# Patient Record
Sex: MALE | URBAN - METROPOLITAN AREA
[De-identification: names, ages, dates, MRNs, and addresses within clinical notes are randomized per-mention and may not be internally consistent; named-entity substitution may affect disease eponyms.]

---

## 2024-09-13 ENCOUNTER — APPOINTMENT (OUTPATIENT)
Dept: CARDIOLOGY | Facility: HOSPITAL | Age: 64
End: 2024-09-13
Payer: COMMERCIAL

## 2024-09-13 ENCOUNTER — APPOINTMENT (OUTPATIENT)
Dept: RADIOLOGY | Facility: HOSPITAL | Age: 64
End: 2024-09-13
Payer: COMMERCIAL

## 2024-09-13 ENCOUNTER — HOSPITAL ENCOUNTER (INPATIENT)
Facility: HOSPITAL | Age: 64
End: 2024-09-13
Attending: INTERNAL MEDICINE | Admitting: INTERNAL MEDICINE
Payer: COMMERCIAL

## 2024-09-13 DIAGNOSIS — K70.40 ALCOHOLIC LIVER FAILURE (MULTI): Primary | ICD-10-CM

## 2024-09-13 DIAGNOSIS — K76.82 HEPATIC ENCEPHALOPATHY: ICD-10-CM

## 2024-09-13 DIAGNOSIS — I50.32 CHRONIC HEART FAILURE WITH PRESERVED EJECTION FRACTION: ICD-10-CM

## 2024-09-13 DIAGNOSIS — K70.9: ICD-10-CM

## 2024-09-13 DIAGNOSIS — S91.309A MULTIPLE OPEN WOUNDS OF FOOT: ICD-10-CM

## 2024-09-13 DIAGNOSIS — R00.0 TACHYCARDIA: ICD-10-CM

## 2024-09-13 DIAGNOSIS — Z13.6 ENCOUNTER FOR SCREENING FOR CARDIOVASCULAR DISORDERS: ICD-10-CM

## 2024-09-13 DIAGNOSIS — E43 SEVERE PROTEIN-CALORIE MALNUTRITION (MULTI): ICD-10-CM

## 2024-09-13 LAB
A1AT SERPL NEPH-MCNC: 167 MG/DL (ref 84–218)
ABO GROUP (TYPE) IN BLOOD: NORMAL
ABO GROUP (TYPE) IN BLOOD: NORMAL
ACANTHOCYTES BLD QL SMEAR: ABNORMAL
AFP SERPL-MCNC: 4 NG/ML (ref 0–9)
ALBUMIN SERPL BCP-MCNC: 1.7 G/DL (ref 3.4–5)
ALP SERPL-CCNC: 804 U/L (ref 33–136)
ALT SERPL W P-5'-P-CCNC: 278 U/L (ref 10–52)
AMPHETAMINES UR QL SCN: ABNORMAL
ANION GAP BLDV CALCULATED.4IONS-SCNC: 13 MMOL/L (ref 10–25)
ANION GAP SERPL CALC-SCNC: 17 MMOL/L (ref 10–20)
ANTIBODY SCREEN: NORMAL
APAP SERPL-MCNC: <10 UG/ML
APTT PPP: 125 SECONDS (ref 27–38)
AST SERPL W P-5'-P-CCNC: 945 U/L (ref 9–39)
BARBITURATES UR QL SCN: ABNORMAL
BASE EXCESS BLDV CALC-SCNC: -0.6 MMOL/L (ref -2–3)
BASOPHILS # BLD AUTO: 0.01 X10*3/UL (ref 0–0.1)
BASOPHILS # BLD MANUAL: 0 X10*3/UL (ref 0–0.1)
BASOPHILS NFR BLD AUTO: 0.1 %
BASOPHILS NFR BLD MANUAL: 0 %
BENZODIAZ UR QL SCN: ABNORMAL
BILIRUB DIRECT SERPL-MCNC: 1.3 MG/DL (ref 0–0.3)
BILIRUB SERPL-MCNC: 2.7 MG/DL (ref 0–1.2)
BODY TEMPERATURE: 37 DEGREES CELSIUS
BUN SERPL-MCNC: 8 MG/DL (ref 6–23)
BURR CELLS BLD QL SMEAR: ABNORMAL
BZE UR QL SCN: ABNORMAL
CA-I BLDV-SCNC: 1.09 MMOL/L (ref 1.1–1.33)
CALCIUM SERPL-MCNC: 7.6 MG/DL (ref 8.6–10.6)
CANNABINOIDS UR QL SCN: ABNORMAL
CERULOPLASMIN SERPL-MCNC: 31 MG/DL (ref 20–60)
CFT FORM KAOLIN IND BLD RES TEG: ABNORMAL MIN
CHLORIDE BLDV-SCNC: 109 MMOL/L (ref 98–107)
CHLORIDE SERPL-SCNC: 106 MMOL/L (ref 98–107)
CK SERPL-CCNC: 261 U/L (ref 0–325)
CLOT ANGLE.KAOLIN INDUCED BLD RES TEG: <39 DEG (ref 63–78)
CLOT INIT KAO IND P HEP NEUT BLD RES TEG: 11.9 MIN (ref 4.3–8.3)
CLOT INIT KAO IND P HEP NEUT BLD RES TEG: >17 MIN (ref 4.6–9.1)
CO2 SERPL-SCNC: 24 MMOL/L (ref 21–32)
CREAT SERPL-MCNC: 0.85 MG/DL (ref 0.5–1.3)
EBV DNA SPEC NAA+PROBE-LOG#: ABNORMAL {LOG_COPIES}/ML
EGFRCR SERPLBLD CKD-EPI 2021: >90 ML/MIN/1.73M*2
EJECTION FRACTION APICAL 4 CHAMBER: 82.8
EJECTION FRACTION: 78 %
EOSINOPHIL # BLD AUTO: 0.01 X10*3/UL (ref 0–0.7)
EOSINOPHIL # BLD MANUAL: 0 X10*3/UL (ref 0–0.7)
EOSINOPHIL NFR BLD AUTO: 0.1 %
EOSINOPHIL NFR BLD MANUAL: 0 %
ERYTHROCYTE [DISTWIDTH] IN BLOOD BY AUTOMATED COUNT: 14.9 % (ref 11.5–14.5)
ERYTHROCYTE [DISTWIDTH] IN BLOOD BY AUTOMATED COUNT: 15.9 % (ref 11.5–14.5)
FENTANYL+NORFENTANYL UR QL SCN: ABNORMAL
FERRITIN SERPL-MCNC: >9000 NG/ML (ref 20–300)
FIBRINOGEN BLD CALC-MCNC: 423 MG/DL (ref 278–581)
FIBRINOGEN PPP-MCNC: 203 MG/DL (ref 200–400)
GLUCOSE BLD MANUAL STRIP-MCNC: 100 MG/DL (ref 74–99)
GLUCOSE BLD MANUAL STRIP-MCNC: 111 MG/DL (ref 74–99)
GLUCOSE BLD MANUAL STRIP-MCNC: 118 MG/DL (ref 74–99)
GLUCOSE BLD MANUAL STRIP-MCNC: 97 MG/DL (ref 74–99)
GLUCOSE BLDV-MCNC: 99 MG/DL (ref 74–99)
GLUCOSE SERPL-MCNC: 99 MG/DL (ref 74–99)
HAPTOGLOB SERPL NEPH-MCNC: <30 MG/DL (ref 30–200)
HAV IGM SER QL: NONREACTIVE
HBV CORE IGM SER QL: NONREACTIVE
HBV SURFACE AG SERPL QL IA: NONREACTIVE
HCO3 BLDV-SCNC: 24.2 MMOL/L (ref 22–26)
HCT VFR BLD AUTO: 26.4 % (ref 41–52)
HCT VFR BLD AUTO: 28.2 % (ref 41–52)
HCT VFR BLD EST: 10 % (ref 41–52)
HCV AB SER QL: NONREACTIVE
HCV RNA SERPL NAA+PROBE-ACNC: NOT DETECTED K[IU]/ML
HCV RNA SERPL NAA+PROBE-LOG IU: NORMAL {LOG_IU}/ML
HGB BLD-MCNC: 9.4 G/DL (ref 13.5–17.5)
HGB BLD-MCNC: 9.5 G/DL (ref 13.5–17.5)
HGB BLDV-MCNC: 3.3 G/DL (ref 13.5–17.5)
HSV1 DNA BLD QL NAA+PROBE: NOT DETECTED
HSV2 DNA BLD QL NAA+PROBE: NOT DETECTED
IGG SERPL-MCNC: 1600 MG/DL (ref 700–1600)
IMM GRANULOCYTES # BLD AUTO: 0.05 X10*3/UL (ref 0–0.7)
IMM GRANULOCYTES # BLD AUTO: 0.06 X10*3/UL (ref 0–0.7)
IMM GRANULOCYTES NFR BLD AUTO: 0.5 % (ref 0–0.9)
IMM GRANULOCYTES NFR BLD AUTO: 0.7 % (ref 0–0.9)
INHALED O2 CONCENTRATION: 21 %
INR PPP: 2.8 (ref 0.9–1.1)
IRON SATN MFR SERPL: ABNORMAL %
IRON SERPL-MCNC: 85 UG/DL (ref 35–150)
LABORATORY COMMENT REPORT: ABNORMAL
LABORATORY COMMENT REPORT: NORMAL
LACTATE BLDV-SCNC: 4.8 MMOL/L (ref 0.4–2)
LDH SERPL L TO P-CCNC: 846 U/L (ref 84–246)
LEFT VENTRICLE INTERNAL DIMENSION DIASTOLE: 3.3 CM (ref 3.5–6)
LIPASE SERPL-CCNC: 13 U/L (ref 9–82)
LYMPHOCYTES # BLD AUTO: 1.08 X10*3/UL (ref 1.2–4.8)
LYMPHOCYTES # BLD MANUAL: 0.63 X10*3/UL (ref 1.2–4.8)
LYMPHOCYTES NFR BLD AUTO: 10.7 %
LYMPHOCYTES NFR BLD MANUAL: 7.8 %
MA KAOLIN BLD RES TEG: <40 MM (ref 52–69)
MA KAOLIN+TF BLD RES TEG: 65 MM (ref 52–70)
MA TF IND+IIB-IIIA INH BLD RES TEG: 23 MM (ref 15–32)
MCH RBC QN AUTO: 28.3 PG (ref 26–34)
MCH RBC QN AUTO: 28.5 PG (ref 26–34)
MCHC RBC AUTO-ENTMCNC: 33.3 G/DL (ref 32–36)
MCHC RBC AUTO-ENTMCNC: 36 G/DL (ref 32–36)
MCV RBC AUTO: 79 FL (ref 80–100)
MCV RBC AUTO: 86 FL (ref 80–100)
METHADONE UR QL SCN: ABNORMAL
MONOCYTES # BLD AUTO: 0.47 X10*3/UL (ref 0.1–1)
MONOCYTES # BLD MANUAL: 0.15 X10*3/UL (ref 0.1–1)
MONOCYTES NFR BLD AUTO: 4.6 %
MONOCYTES NFR BLD MANUAL: 1.8 %
NEUTROPHILS # BLD AUTO: 8.49 X10*3/UL (ref 1.2–7.7)
NEUTROPHILS NFR BLD AUTO: 84 %
NEUTS SEG # BLD MANUAL: 7.32 X10*3/UL (ref 1.2–7)
NEUTS SEG NFR BLD MANUAL: 90.4 %
NRBC BLD-RTO: 0.2 /100 WBCS (ref 0–0)
NRBC BLD-RTO: 0.7 /100 WBCS (ref 0–0)
OPIATES UR QL SCN: ABNORMAL
OXYCODONE+OXYMORPHONE UR QL SCN: ABNORMAL
OXYHGB MFR BLDV: 60.2 % (ref 45–75)
PATH REVIEW-CBC DIFFERENTIAL: NORMAL
PCO2 BLDV: 39 MM HG (ref 41–51)
PCP UR QL SCN: ABNORMAL
PH BLDV: 7.4 PH (ref 7.33–7.43)
PHOSPHATE SERPL-MCNC: 2.5 MG/DL (ref 2.5–4.9)
PLATELET # BLD AUTO: 112 X10*3/UL (ref 150–450)
PLATELET # BLD AUTO: 112 X10*3/UL (ref 150–450)
PO2 BLDV: 42 MM HG (ref 35–45)
POTASSIUM BLDV-SCNC: 4.1 MMOL/L (ref 3.5–5.3)
POTASSIUM SERPL-SCNC: 4 MMOL/L (ref 3.5–5.3)
PROT SERPL-MCNC: 4.9 G/DL (ref 6.4–8.2)
PROTHROMBIN TIME: 32.4 SECONDS (ref 9.8–12.8)
RBC # BLD AUTO: 3.3 X10*6/UL (ref 4.5–5.9)
RBC # BLD AUTO: 3.36 X10*6/UL (ref 4.5–5.9)
RBC MORPH BLD: ABNORMAL
RH FACTOR (ANTIGEN D): NORMAL
RH FACTOR (ANTIGEN D): NORMAL
RIGHT VENTRICLE FREE WALL PEAK S': 19.8 CM/S
RIGHT VENTRICLE PEAK SYSTOLIC PRESSURE: 34.1 MMHG
SAO2 % BLDV: 61 % (ref 45–75)
SODIUM BLDV-SCNC: 142 MMOL/L (ref 136–145)
SODIUM SERPL-SCNC: 143 MMOL/L (ref 136–145)
TARGETS BLD QL SMEAR: ABNORMAL
TIBC SERPL-MCNC: ABNORMAL UG/DL
TOTAL CELLS COUNTED BLD: 115
TRIGL SERPL-MCNC: 106 MG/DL (ref 0–149)
TSH SERPL-ACNC: 0.87 MIU/L (ref 0.44–3.98)
UIBC SERPL-MCNC: <55 UG/DL (ref 110–370)
WBC # BLD AUTO: 10.1 X10*3/UL (ref 4.4–11.3)
WBC # BLD AUTO: 8.1 X10*3/UL (ref 4.4–11.3)

## 2024-09-13 PROCEDURE — 82947 ASSAY GLUCOSE BLOOD QUANT: CPT

## 2024-09-13 PROCEDURE — 2500000004 HC RX 250 GENERAL PHARMACY W/ HCPCS (ALT 636 FOR OP/ED)

## 2024-09-13 PROCEDURE — 86381 MITOCHONDRIAL ANTIBODY EACH: CPT

## 2024-09-13 PROCEDURE — 85610 PROTHROMBIN TIME: CPT

## 2024-09-13 PROCEDURE — 71045 X-RAY EXAM CHEST 1 VIEW: CPT

## 2024-09-13 PROCEDURE — 83010 ASSAY OF HAPTOGLOBIN QUANT: CPT

## 2024-09-13 PROCEDURE — 87521 HEPATITIS C PROBE&RVRS TRNSC: CPT

## 2024-09-13 PROCEDURE — 93325 DOPPLER ECHO COLOR FLOW MAPG: CPT

## 2024-09-13 PROCEDURE — 99232 SBSQ HOSP IP/OBS MODERATE 35: CPT

## 2024-09-13 PROCEDURE — 86381 MITOCHONDRIAL ANTIBODY EACH: CPT | Performed by: STUDENT IN AN ORGANIZED HEALTH CARE EDUCATION/TRAINING PROGRAM

## 2024-09-13 PROCEDURE — 86015 ACTIN ANTIBODY EACH: CPT

## 2024-09-13 PROCEDURE — 99291 CRITICAL CARE FIRST HOUR: CPT

## 2024-09-13 PROCEDURE — 93325 DOPPLER ECHO COLOR FLOW MAPG: CPT | Performed by: INTERNAL MEDICINE

## 2024-09-13 PROCEDURE — 87529 HSV DNA AMP PROBE: CPT

## 2024-09-13 PROCEDURE — 36415 COLL VENOUS BLD VENIPUNCTURE: CPT

## 2024-09-13 PROCEDURE — 85027 COMPLETE CBC AUTOMATED: CPT

## 2024-09-13 PROCEDURE — 36600 WITHDRAWAL OF ARTERIAL BLOOD: CPT

## 2024-09-13 PROCEDURE — 85007 BL SMEAR W/DIFF WBC COUNT: CPT

## 2024-09-13 PROCEDURE — 85384 FIBRINOGEN ACTIVITY: CPT

## 2024-09-13 PROCEDURE — 2500000001 HC RX 250 WO HCPCS SELF ADMINISTERED DRUGS (ALT 637 FOR MEDICARE OP)

## 2024-09-13 PROCEDURE — 80143 DRUG ASSAY ACETAMINOPHEN: CPT

## 2024-09-13 PROCEDURE — 93975 VASCULAR STUDY: CPT

## 2024-09-13 PROCEDURE — 93005 ELECTROCARDIOGRAM TRACING: CPT

## 2024-09-13 PROCEDURE — 87799 DETECT AGENT NOS DNA QUANT: CPT

## 2024-09-13 PROCEDURE — 85025 COMPLETE CBC W/AUTO DIFF WBC: CPT

## 2024-09-13 PROCEDURE — 93308 TTE F-UP OR LMTD: CPT | Performed by: INTERNAL MEDICINE

## 2024-09-13 PROCEDURE — 71045 X-RAY EXAM CHEST 1 VIEW: CPT | Performed by: RADIOLOGY

## 2024-09-13 PROCEDURE — 76705 ECHO EXAM OF ABDOMEN: CPT | Performed by: RADIOLOGY

## 2024-09-13 PROCEDURE — 82103 ALPHA-1-ANTITRYPSIN TOTAL: CPT

## 2024-09-13 PROCEDURE — 85060 BLOOD SMEAR INTERPRETATION: CPT | Performed by: PATHOLOGY

## 2024-09-13 PROCEDURE — 82784 ASSAY IGA/IGD/IGG/IGM EACH: CPT

## 2024-09-13 PROCEDURE — 82390 ASSAY OF CERULOPLASMIN: CPT

## 2024-09-13 PROCEDURE — 93010 ELECTROCARDIOGRAM REPORT: CPT | Performed by: INTERNAL MEDICINE

## 2024-09-13 PROCEDURE — 93321 DOPPLER ECHO F-UP/LMTD STD: CPT | Performed by: INTERNAL MEDICINE

## 2024-09-13 PROCEDURE — 83690 ASSAY OF LIPASE: CPT

## 2024-09-13 PROCEDURE — 86901 BLOOD TYPING SEROLOGIC RH(D): CPT

## 2024-09-13 PROCEDURE — 86376 MICROSOMAL ANTIBODY EACH: CPT

## 2024-09-13 PROCEDURE — 93975 VASCULAR STUDY: CPT | Performed by: RADIOLOGY

## 2024-09-13 PROCEDURE — 84100 ASSAY OF PHOSPHORUS: CPT

## 2024-09-13 PROCEDURE — 84478 ASSAY OF TRIGLYCERIDES: CPT

## 2024-09-13 PROCEDURE — 87340 HEPATITIS B SURFACE AG IA: CPT

## 2024-09-13 PROCEDURE — 83615 LACTATE (LD) (LDH) ENZYME: CPT

## 2024-09-13 PROCEDURE — 1100000001 HC PRIVATE ROOM DAILY

## 2024-09-13 PROCEDURE — 84132 ASSAY OF SERUM POTASSIUM: CPT

## 2024-09-13 PROCEDURE — 82105 ALPHA-FETOPROTEIN SERUM: CPT | Performed by: STUDENT IN AN ORGANIZED HEALTH CARE EDUCATION/TRAINING PROGRAM

## 2024-09-13 PROCEDURE — 82435 ASSAY OF BLOOD CHLORIDE: CPT

## 2024-09-13 PROCEDURE — 80321 ALCOHOLS BIOMARKERS 1OR 2: CPT

## 2024-09-13 PROCEDURE — 80307 DRUG TEST PRSMV CHEM ANLYZR: CPT

## 2024-09-13 PROCEDURE — 99253 IP/OBS CNSLTJ NEW/EST LOW 45: CPT | Performed by: STUDENT IN AN ORGANIZED HEALTH CARE EDUCATION/TRAINING PROGRAM

## 2024-09-13 PROCEDURE — 2500000005 HC RX 250 GENERAL PHARMACY W/O HCPCS

## 2024-09-13 PROCEDURE — 86038 ANTINUCLEAR ANTIBODIES: CPT

## 2024-09-13 PROCEDURE — 83550 IRON BINDING TEST: CPT

## 2024-09-13 PROCEDURE — 86803 HEPATITIS C AB TEST: CPT

## 2024-09-13 PROCEDURE — 82728 ASSAY OF FERRITIN: CPT

## 2024-09-13 PROCEDURE — 82550 ASSAY OF CK (CPK): CPT | Performed by: STUDENT IN AN ORGANIZED HEALTH CARE EDUCATION/TRAINING PROGRAM

## 2024-09-13 PROCEDURE — 84443 ASSAY THYROID STIM HORMONE: CPT

## 2024-09-13 PROCEDURE — 84075 ASSAY ALKALINE PHOSPHATASE: CPT

## 2024-09-13 RX ORDER — TALC
6 POWDER (GRAM) TOPICAL DAILY
Status: DISCONTINUED | OUTPATIENT
Start: 2024-09-13 | End: 2024-10-01 | Stop reason: HOSPADM

## 2024-09-13 RX ORDER — METOPROLOL TARTRATE 25 MG/1
25 TABLET, FILM COATED ORAL 2 TIMES DAILY
Status: DISCONTINUED | OUTPATIENT
Start: 2024-09-13 | End: 2024-10-01 | Stop reason: HOSPADM

## 2024-09-13 RX ORDER — LANOLIN ALCOHOL/MO/W.PET/CERES
100 CREAM (GRAM) TOPICAL DAILY
COMMUNITY

## 2024-09-13 RX ORDER — LIDOCAINE 560 MG/1
1 PATCH PERCUTANEOUS; TOPICAL; TRANSDERMAL DAILY
Status: DISCONTINUED | OUTPATIENT
Start: 2024-09-13 | End: 2024-09-25

## 2024-09-13 RX ORDER — PANTOPRAZOLE SODIUM 40 MG/1
40 TABLET, DELAYED RELEASE ORAL
COMMUNITY

## 2024-09-13 RX ORDER — ESOMEPRAZOLE MAGNESIUM 40 MG/1
40 GRANULE, DELAYED RELEASE ORAL
Status: DISCONTINUED | OUTPATIENT
Start: 2024-09-13 | End: 2024-10-01 | Stop reason: HOSPADM

## 2024-09-13 RX ORDER — IBUPROFEN 400 MG/1
800 TABLET ORAL ONCE
Status: DISCONTINUED | OUTPATIENT
Start: 2024-09-13 | End: 2024-09-13

## 2024-09-13 RX ORDER — ATORVASTATIN CALCIUM 40 MG/1
40 TABLET, FILM COATED ORAL NIGHTLY
COMMUNITY

## 2024-09-13 RX ORDER — IBUPROFEN 400 MG/1
800 TABLET ORAL EVERY 8 HOURS PRN
Status: DISCONTINUED | OUTPATIENT
Start: 2024-09-13 | End: 2024-09-13

## 2024-09-13 RX ORDER — LEVOTHYROXINE SODIUM 125 UG/1
125 TABLET ORAL DAILY
COMMUNITY

## 2024-09-13 RX ORDER — PANTOPRAZOLE SODIUM 40 MG/1
40 TABLET, DELAYED RELEASE ORAL
Status: DISCONTINUED | OUTPATIENT
Start: 2024-09-13 | End: 2024-10-01 | Stop reason: HOSPADM

## 2024-09-13 RX ORDER — NAPROXEN SODIUM 220 MG/1
81 TABLET, FILM COATED ORAL DAILY
COMMUNITY

## 2024-09-13 RX ORDER — HYDROMORPHONE HYDROCHLORIDE 1 MG/ML
INJECTION, SOLUTION INTRAMUSCULAR; INTRAVENOUS; SUBCUTANEOUS
Status: COMPLETED
Start: 2024-09-13 | End: 2024-09-13

## 2024-09-13 RX ORDER — NAPROXEN SODIUM 220 MG/1
81 TABLET, FILM COATED ORAL DAILY
Status: DISCONTINUED | OUTPATIENT
Start: 2024-09-13 | End: 2024-09-13

## 2024-09-13 RX ORDER — PANTOPRAZOLE SODIUM 40 MG/10ML
40 INJECTION, POWDER, LYOPHILIZED, FOR SOLUTION INTRAVENOUS
Status: DISCONTINUED | OUTPATIENT
Start: 2024-09-13 | End: 2024-10-01 | Stop reason: HOSPADM

## 2024-09-13 RX ORDER — HYDROMORPHONE HYDROCHLORIDE 1 MG/ML
0.2 INJECTION, SOLUTION INTRAMUSCULAR; INTRAVENOUS; SUBCUTANEOUS EVERY 4 HOURS PRN
Status: DISCONTINUED | OUTPATIENT
Start: 2024-09-13 | End: 2024-09-23

## 2024-09-13 RX ORDER — LACTULOSE 10 G/15ML
20 SOLUTION ORAL 3 TIMES DAILY
Status: DISCONTINUED | OUTPATIENT
Start: 2024-09-13 | End: 2024-09-24

## 2024-09-13 SDOH — SOCIAL STABILITY: SOCIAL INSECURITY: ABUSE: ADULT

## 2024-09-13 SDOH — ECONOMIC STABILITY: FOOD INSECURITY: WITHIN THE PAST 12 MONTHS, THE FOOD YOU BOUGHT JUST DIDN'T LAST AND YOU DIDN'T HAVE MONEY TO GET MORE.: NEVER TRUE

## 2024-09-13 SDOH — ECONOMIC STABILITY: HOUSING INSECURITY: IN THE PAST 12 MONTHS, HOW MANY TIMES HAVE YOU MOVED WHERE YOU WERE LIVING?: 0

## 2024-09-13 SDOH — SOCIAL STABILITY: SOCIAL INSECURITY: ARE YOU OR HAVE YOU BEEN THREATENED OR ABUSED PHYSICALLY, EMOTIONALLY, OR SEXUALLY BY ANYONE?: NO

## 2024-09-13 SDOH — ECONOMIC STABILITY: INCOME INSECURITY: IN THE PAST 12 MONTHS, HAS THE ELECTRIC, GAS, OIL, OR WATER COMPANY THREATENED TO SHUT OFF SERVICE IN YOUR HOME?: NO

## 2024-09-13 SDOH — ECONOMIC STABILITY: FOOD INSECURITY: WITHIN THE PAST 12 MONTHS, YOU WORRIED THAT YOUR FOOD WOULD RUN OUT BEFORE YOU GOT MONEY TO BUY MORE.: NEVER TRUE

## 2024-09-13 SDOH — ECONOMIC STABILITY: HOUSING INSECURITY: AT ANY TIME IN THE PAST 12 MONTHS, WERE YOU HOMELESS OR LIVING IN A SHELTER (INCLUDING NOW)?: NO

## 2024-09-13 SDOH — ECONOMIC STABILITY: INCOME INSECURITY: HOW HARD IS IT FOR YOU TO PAY FOR THE VERY BASICS LIKE FOOD, HOUSING, MEDICAL CARE, AND HEATING?: NOT HARD AT ALL

## 2024-09-13 SDOH — SOCIAL STABILITY: SOCIAL INSECURITY: WITHIN THE LAST YEAR, HAVE YOU BEEN AFRAID OF YOUR PARTNER OR EX-PARTNER?: NO

## 2024-09-13 SDOH — SOCIAL STABILITY: SOCIAL INSECURITY
WITHIN THE LAST YEAR, HAVE YOU BEEN KICKED, HIT, SLAPPED, OR OTHERWISE PHYSICALLY HURT BY YOUR PARTNER OR EX-PARTNER?: NO

## 2024-09-13 SDOH — SOCIAL STABILITY: SOCIAL INSECURITY: WERE YOU ABLE TO COMPLETE ALL THE BEHAVIORAL HEALTH SCREENINGS?: YES

## 2024-09-13 SDOH — SOCIAL STABILITY: SOCIAL INSECURITY: WITHIN THE LAST YEAR, HAVE YOU BEEN HUMILIATED OR EMOTIONALLY ABUSED IN OTHER WAYS BY YOUR PARTNER OR EX-PARTNER?: NO

## 2024-09-13 SDOH — SOCIAL STABILITY: SOCIAL INSECURITY: ARE THERE ANY APPARENT SIGNS OF INJURIES/BEHAVIORS THAT COULD BE RELATED TO ABUSE/NEGLECT?: NO

## 2024-09-13 SDOH — SOCIAL STABILITY: SOCIAL INSECURITY
WITHIN THE LAST YEAR, HAVE TO BEEN RAPED OR FORCED TO HAVE ANY KIND OF SEXUAL ACTIVITY BY YOUR PARTNER OR EX-PARTNER?: NO

## 2024-09-13 SDOH — ECONOMIC STABILITY: INCOME INSECURITY: IN THE LAST 12 MONTHS, WAS THERE A TIME WHEN YOU WERE NOT ABLE TO PAY THE MORTGAGE OR RENT ON TIME?: NO

## 2024-09-13 SDOH — ECONOMIC STABILITY: TRANSPORTATION INSECURITY
IN THE PAST 12 MONTHS, HAS THE LACK OF TRANSPORTATION KEPT YOU FROM MEDICAL APPOINTMENTS OR FROM GETTING MEDICATIONS?: NO

## 2024-09-13 SDOH — ECONOMIC STABILITY: TRANSPORTATION INSECURITY
IN THE PAST 12 MONTHS, HAS LACK OF TRANSPORTATION KEPT YOU FROM MEETINGS, WORK, OR FROM GETTING THINGS NEEDED FOR DAILY LIVING?: NO

## 2024-09-13 SDOH — SOCIAL STABILITY: SOCIAL INSECURITY: HAS ANYONE EVER THREATENED TO HURT YOUR FAMILY OR YOUR PETS?: NO

## 2024-09-13 SDOH — SOCIAL STABILITY: SOCIAL INSECURITY: DOES ANYONE TRY TO KEEP YOU FROM HAVING/CONTACTING OTHER FRIENDS OR DOING THINGS OUTSIDE YOUR HOME?: NO

## 2024-09-13 SDOH — SOCIAL STABILITY: SOCIAL INSECURITY: DO YOU FEEL ANYONE HAS EXPLOITED OR TAKEN ADVANTAGE OF YOU FINANCIALLY OR OF YOUR PERSONAL PROPERTY?: NO

## 2024-09-13 SDOH — SOCIAL STABILITY: SOCIAL INSECURITY: DO YOU FEEL UNSAFE GOING BACK TO THE PLACE WHERE YOU ARE LIVING?: NO

## 2024-09-13 SDOH — SOCIAL STABILITY: SOCIAL INSECURITY: HAVE YOU HAD ANY THOUGHTS OF HARMING ANYONE ELSE?: NO

## 2024-09-13 SDOH — SOCIAL STABILITY: SOCIAL INSECURITY: HAVE YOU HAD THOUGHTS OF HARMING ANYONE ELSE?: NO

## 2024-09-13 ASSESSMENT — LIFESTYLE VARIABLES
SKIP TO QUESTIONS 9-10: 1
HOW MANY STANDARD DRINKS CONTAINING ALCOHOL DO YOU HAVE ON A TYPICAL DAY: PATIENT DOES NOT DRINK
AUDIT-C TOTAL SCORE: 0
HOW OFTEN DO YOU HAVE 6 OR MORE DRINKS ON ONE OCCASION: NEVER
AUDIT-C TOTAL SCORE: 0
HOW OFTEN DO YOU HAVE A DRINK CONTAINING ALCOHOL: NEVER

## 2024-09-13 ASSESSMENT — COGNITIVE AND FUNCTIONAL STATUS - GENERAL
MOBILITY SCORE: 18
DAILY ACTIVITIY SCORE: 18
PATIENT BASELINE BEDBOUND: NO
EATING MEALS: A LITTLE
MOVING TO AND FROM BED TO CHAIR: A LITTLE
TURNING FROM BACK TO SIDE WHILE IN FLAT BAD: A LITTLE
DRESSING REGULAR LOWER BODY CLOTHING: A LITTLE
HELP NEEDED FOR BATHING: A LITTLE
MOVING FROM LYING ON BACK TO SITTING ON SIDE OF FLAT BED WITH BEDRAILS: A LITTLE
DRESSING REGULAR UPPER BODY CLOTHING: A LITTLE
STANDING UP FROM CHAIR USING ARMS: A LITTLE
WALKING IN HOSPITAL ROOM: A LITTLE
TOILETING: A LITTLE
PERSONAL GROOMING: A LITTLE
CLIMB 3 TO 5 STEPS WITH RAILING: A LITTLE

## 2024-09-13 ASSESSMENT — PAIN - FUNCTIONAL ASSESSMENT
PAIN_FUNCTIONAL_ASSESSMENT: 0-10
PAIN_FUNCTIONAL_ASSESSMENT: CPOT (CRITICAL CARE PAIN OBSERVATION TOOL)
PAIN_FUNCTIONAL_ASSESSMENT: 0-10

## 2024-09-13 ASSESSMENT — ACTIVITIES OF DAILY LIVING (ADL)
TOILETING: NEEDS ASSISTANCE
JUDGMENT_ADEQUATE_SAFELY_COMPLETE_DAILY_ACTIVITIES: YES
HEARING - LEFT EAR: FUNCTIONAL
ADEQUATE_TO_COMPLETE_ADL: YES
PATIENT'S MEMORY ADEQUATE TO SAFELY COMPLETE DAILY ACTIVITIES?: YES
LACK_OF_TRANSPORTATION: NO
BATHING: NEEDS ASSISTANCE
FEEDING YOURSELF: NEEDS ASSISTANCE
GROOMING: NEEDS ASSISTANCE
HEARING - RIGHT EAR: FUNCTIONAL
WALKS IN HOME: NEEDS ASSISTANCE
DRESSING YOURSELF: NEEDS ASSISTANCE

## 2024-09-13 ASSESSMENT — PATIENT HEALTH QUESTIONNAIRE - PHQ9
SUM OF ALL RESPONSES TO PHQ9 QUESTIONS 1 & 2: 0
1. LITTLE INTEREST OR PLEASURE IN DOING THINGS: NOT AT ALL
2. FEELING DOWN, DEPRESSED OR HOPELESS: NOT AT ALL

## 2024-09-13 ASSESSMENT — COLUMBIA-SUICIDE SEVERITY RATING SCALE - C-SSRS
2. HAVE YOU ACTUALLY HAD ANY THOUGHTS OF KILLING YOURSELF?: NO
1. IN THE PAST MONTH, HAVE YOU WISHED YOU WERE DEAD OR WISHED YOU COULD GO TO SLEEP AND NOT WAKE UP?: NO
6. HAVE YOU EVER DONE ANYTHING, STARTED TO DO ANYTHING, OR PREPARED TO DO ANYTHING TO END YOUR LIFE?: NO

## 2024-09-13 ASSESSMENT — PAIN SCALES - GENERAL
PAINLEVEL_OUTOF10: 3
PAINLEVEL_OUTOF10: 10 - WORST POSSIBLE PAIN
PAINLEVEL_OUTOF10: 7

## 2024-09-13 NOTE — CONSULTS
"Nutrition Initial Assessment:   Nutrition Assessment    Reason for Assessment: Admission nursing screening    Patient is a 63 y.o. male presenting as a transfer from [facility] ICU for workup of acute liver injury vs failure and potential transplant evaluation.     Per chart review:  Pt initially presented to [facility] after a fall - found to have elevated alk phos with an INR of 6.5 iso alcohol use disorder with last drink one month ago. Patient became encephalopathic and with worsening liver enzymes - transferred to [facility].     Currently with acute liver failure; chronic pancreatitis.    PMHx of HFpEF, DM, chronic pancreatitis, HTN, DLD, and alcohol use disorder       Nutrition History:  Energy Intake: Poor < 50 %  Food and Nutrient History: Pt pleasant, however, focused on needing pain controlled during interview. Reported he has \"eaten nothing in weeks\". Mentioned that Ensure and Boost are \"garbage\" and make him sick. When RDN asked what symptoms he has when he drinks them, he said diarrhea and vomiting. He was asked if he has tried the clear versions or the standard versions that taste more like a flavored milk - he answered \"none of them work\". It was explained to him that we have other ONS available at the hospital, given that they would be beneficial, and asked if he would like to try them (began to list them) - he then said \"it will take you a thousand years to bring them to me so don't bother\". During our conversation he continued to repeat how much pain he was in, that his eyes hurt, and that he had just had diarrhea \"all over himself\". When RDN asked if he would like the nurse and to continue the conversation at a later date, he stated \"let's do that and you come up with a plan for me that you think will work\". Per chart review it is documented that pt reported drinking 2 fifths of liquor a day for many years  Food Allergies/Intolerances:  None       Anthropometrics:  Height: 175.3 cm (5' 9\")   Weight: 67.5 kg " (148 lb 13 oz)   BMI (Calculated): 21.97  IBW/kg (Dietitian Calculated): 72.7 kg  Percent of IBW: 93 %       Weight History:   Wt Readings from Last 10 Encounters:   09/13/24 67.5 kg (148 lb 13 oz)   09/11/24 69.9 kg (154 lb)     Weight Change %:  Weight History / % Weight Change: No detailed wt hx per chart review. Pt unable to provide info.    Nutrition Focused Physical Exam Findings:  defer: interview stopped early d/t pt engagement - visually with severe muscle wasting and fat loss     Nutrition Significant Labs:  CBC Trend:   Results from last 7 days   Lab Units 09/13/24  0302   WBC AUTO x10*3/uL 10.1   RBC AUTO x10*6/uL 3.36*   HEMOGLOBIN g/dL 9.5*   HEMATOCRIT % 26.4*   MCV fL 79*   PLATELETS AUTO x10*3/uL 112*    , BMP Trend:   Results from last 7 days   Lab Units 09/13/24  0254   GLUCOSE mg/dL 99   CALCIUM mg/dL 7.6*   SODIUM mmol/L 143   POTASSIUM mmol/L 4.0   CO2 mmol/L 24   CHLORIDE mmol/L 106   BUN mg/dL 8   CREATININE mg/dL 0.85    , A1C:  Lab Results   Component Value Date    HGBA1C 4.4 08/12/2024   , BG POCT trend:   Results from last 7 days   Lab Units 09/13/24  1139 09/13/24  0752 09/13/24  0147   POCT GLUCOSE mg/dL 118* 100* 97    , Renal Lab Trend:   Results from last 7 days   Lab Units 09/13/24  0254   POTASSIUM mmol/L 4.0   PHOSPHORUS mg/dL 2.5   SODIUM mmol/L 143   EGFR mL/min/1.73m*2 >90   BUN mg/dL 8   CREATININE mg/dL 0.85        Nutrition Specific Medications:  Scheduled medications  acetylcysteine, 50 mg/kg, intravenous, Once  acetylcysteine, 100 mg/kg, intravenous, Once  pantoprazole, 40 mg, oral, Daily before breakfast   Or  esomeprazole, 40 mg, nasoduodenal tube, Daily before breakfast   Or  pantoprazole, 40 mg, intravenous, Daily before breakfast  lactulose, 20 g, oral, TID  levothyroxine, 125 mcg, oral, Daily  melatonin, 6 mg, oral, Daily  metoprolol tartrate, 25 mg, oral, BID    I/O:   Last BM Date: 09/13/24; Stool Appearance: Mucous (09/13/24 0500)    Dietary Orders (From  admission, onward)       Start     Ordered    09/13/24 0943  Adult diet Regular  Diet effective now        Question:  Diet type  Answer:  Regular    09/13/24 0943         Estimated Needs:   Total Energy Estimated Needs (kCal): 2000 kCal  Method for Estimating Needs: 30kcal/kg per act wt  Total Protein Estimated Needs (g): 90 g  Method for Estimating Needs: ~1.3g/kg per act wt  Total Fluid Estimated Needs (mL):  (per team)        Nutrition Diagnosis   Malnutrition Diagnosis  Patient has Malnutrition Diagnosis: Yes  Diagnosis Status: New  Malnutrition Diagnosis: Severe malnutrition related to acute disease or injury (could be multifactorial (social/environmental and chronic as well))  As Evidenced by: severe muscle wasting and fat loss (visual), reported not eating anything for weeks/ETOH use (predicted intake <50% of needs for >/=5 days)    Nutrition Diagnosis  Patient has Nutrition Diagnosis: Yes  Diagnosis Status (1): New  Nutrition Diagnosis 1: Increased nutrient needs  Related to (1): increased metabolic demand  As Evidenced by (1): acute liver failure       Nutrition Interventions/Recommendations         Nutrition Prescription:  Individualized Nutrition Prescription Provided for : diet + ONS        Nutrition Interventions:   Interventions: Commercial beverage  Goal: Gelatein Plus (BID) and [place] Breakfast Essentials with milk (BID)       Nutrition Monitoring and Evaluation   Food/Nutrient Related History Monitoring  Monitoring and Evaluation Plan: Energy intake  Energy Intake: Estimated energy intake  Criteria: PO + ONS meets >/=75% of needs    Body Composition/Growth/Weight History  Monitoring and Evaluation Plan: Weight  Weight: Measured weight  Criteria: Weekly weights    Time Spent (min): 60 minutes

## 2024-09-13 NOTE — PROGRESS NOTES
Manuel Bowser is a 63 y.o. male on day 0 of admission presenting with Alcoholic liver failure (Multi).      Subjective   Patient was transferred from  MICU to the floor to continue workup for acute liver failure .   Manuel Bowser is a 62 yo male patient with PMHx of DM, HTN, HFpEF, alcohol use disorder, pancreatitis, who presented yesterday as a transfer from [facility] ICU for workup of acute liver failure and evaluation for liver transplant. He initially presented for SOB, chest pain and abdominal pain, diagnosed there with pneumonia. He was found to have increased LFTs, became encephalopathic and admitted to [facility] ICU. Received lactulose there, FFP and vit K for coagulopathy. Transferred to [facility] for continuity of care. Yesterday on admission to the MICU, he was alert and oriented x 4. He still reports chest pain, SOB, and abdominal pain, however, he note pain has improved. LFTs upon arrival were , , alk phos 800, jocelynn total 2.7 direct 1.3, INR 2.8, alb 1.7, normal creat. US indicated mild ascites. The patient is stable and did not have ICU needs, so was transferred to the floor to continue his work up. Hepatology consulted and ordered workup.     Brief summary of OSH records/work up  Per MAR: He was initially receiving atorvastatin 9/10, SQH with last dose being the morning of 9/11, started on lactulose 9/11, continued on levothyroxine 125 mcg daily, melatonin 6 mg daily, pantoprazole 40 mg daily, thiamine 100 mg daily p.o., 4 g calcium gluconate 9/11 and 9/12, Dilaudid 0.35 mg IV, oxycodone 5 mg p.o., vitamin K IV 10 mg x 1 9/12, vitamin K 5 mg p.o. 9/11, Tylenol 650mg x2 on 9/10, aspirin 81 mg 9/10 - 9/11, metoprolol tartrate 25 mg BID 9/10.     Received 1u FFP 9/12 prior to transfer     Labs:  9/10  Initially presented with troponin 0.061--> 0.040  Alkaline phosphatase 904, ALT 56, , total bilirubin 1.0, direct bilirubin 0, albumin 1.6  Total iron 52, iron binding capacity 87, iron  saturation 60, WBC 6.3, hemoglobin 8.8, hematocrit 26.1, MCV 83.7, platelets 157  Sodium 134, potassium 3.1, chloride 107, bicarb 27, BUN 9, creatinine 0.88, calcium 6.7  Hepatitis panel negative, antimitochondrial antibody 29.3  INR 2.8, 5 nucleotidase 133 (0-15), GGT 1778  Vitamin B12 955     9/11  Alkaline phosphatase 1,111, , , total bilirubin 1.9, direct bilirubin 0.3, albumin 1.7  WBC 7.7, hemoglobin 10.0, hematocrit 28.3, MCV 82.5, platelets 155  Sodium 133, potassium 3.7, chloride 107, bicarb 25 BUN 8, creatinine 0.74, calcium 6.7, magnesium 1.6  HIV 1, 2 negative  Ammonia 67  INR 6.5     9/12  WBC 8.8, hemoglobin 12.0 hematocrit 35.9, MCV 86.3, platelets 117  Sodium 135, potassium 4.4, chloride 110, bicarb 18, BUN 7, creatinine 0.67, calcium 7.7, magnesium 1.9  INR greater than 9.5  Sodium 136, potassium 4.3, chloride 110, bicarb 21, BUN 6, creatinine 0.6,  Alkaline phosphatase 943, , AST 1866, total bilirubin 2.6, direct bilirubin 0.7, albumin 1.9  INR greater than 9.5  Alkaline phosphatase 1085, , AST 1851, total bilirubin 2.2, direct bilirubin 0.6, albumin 1.7     MICU nurse prompted the admitting team about a traumatic catheterization while in MICU. Patient has since not urinated and bladder scan showed 140mls of urine.     Objective     Last Recorded Vitals  /75   Pulse 86   Temp 36.3 °C (97.3 °F) (Temporal)   Resp 15   Wt 67.5 kg (148 lb 13 oz)   SpO2 100%   Intake/Output last 3 Shifts:    Intake/Output Summary (Last 24 hours) at 9/13/2024 1724  Last data filed at 9/13/2024 1424  Gross per 24 hour   Intake 1802 ml   Output 50 ml   Net 1752 ml       Admission Weight  Weight: 67.5 kg (148 lb 13 oz) (09/13/24 0100)    Daily Weight  09/13/24 : 67.5 kg (148 lb 13 oz)    Image Results  US liver with doppler  Narrative: Interpreted By:  Kasprzak, Manuel,  and [place] Jen   STUDY:  US LIVER WITH DOPPLER;  9/13/2024 3:58 pm      INDICATION:  Signs/Symptoms:Liver  failure vs liver injury.          COMPARISON:  None.      ACCESSION NUMBER(S):  [id number]      ORDERING CLINICIAN:  JONO ESTRADA      TECHNIQUE:  Multiple images of the right upper quadrant were obtained.  Gray  scale, color Doppler and spectral Doppler waveform analysis was  performed.  This examination was interpreted at [facility].      FINDINGS:  LIVER:  The liver measures 14.7 cm and is diffusely echogenic in appearance,  consistent with diffuse fatty infiltration. The resulting increased  beam attenuation thereby limiting evaluation of the liver for focal  lesions. Within the limitations, no focal lesions are seen.      GALLBLADDER:  The gallbladder is nondistended. Mild smooth thickening of the  gallbladder wall measuring 0.4 cm.      Echogenic sludge within the gallbladder lumen. No discrete  cholelithiasis.      Sonographic Parks's sign is negative.      BILIARY SYSTEM:  No evidence of intra or extrahepatic biliary dilatation is  identified; the common bile duct measures 0.3 cm.      DOPPLER EVALUATION:      HEPATIC ARTERIES:  Hepatic artery and its right and left branches RI's are estimated at  0.8, .82 and 0.6, respectively.      PORTAL VEIN:  Portal vein is patent and measures 1.2 cm. There is normal  respiratory variation. Portal vein velocities are calculated as  follows: main portal vein 11.6 cm/s, antegrade flow; left portal vein  branch 10.4 cm/s, antegrade flow; right portal vein anterior branch  19.9, antegrade flow; right portal vein posterior branch 14.8,  antegrade flow. The splenic vein is also patent.      HEPATIC VEIN:  The right, middle and left hepatic veins are patent and demonstrate  triphasic antegrade flow. IVC appears also patent.      PANCREAS:  The pancreas is poorly visualized due to overlying bowel gas.      RIGHT KIDNEY:  The right kidney measures 9.8 cm in length. No hydronephrosis or  renal calculi are seen.      There is a  cyst of the right interpolar region measuring 0.8 x 0.8 x  0.7 cm.      SPLEEN:  The spleen measures 6.6 cm and is grossly unremarkable.      PERITONEUM AND RETROPERITONEUM:  There is a small volume of abdominal ascites. Note is made of a right  pleural effusion.      Impression: 1. Biliary sludge with mild diffuse thickening of the gallbladder  wall, likely related to ascites. Otherwise the gallbladder is  nondilated without cholelithiasis or sonographic evidence of acute  cholecystitis.  2. Echogenic appearance of the liver compatible with steatosis.  3. Normal Doppler interrogation of the hepatic vasculature.  4. Mild abdominal ascites and a right pleural effusion.      I personally reviewed the image(s)/study and resident interpretation  as stated by Dr. Jen Herrera MD. I agree with the findings as  stated. This study was interpreted at [facility], [place], [place].      MACRO:  None      Signed by: Manuel Rowley 9/13/2024 4:45 PM  Dictation workstation:   [id number]  Transthoracic Echo (TTE) [facility], [address] [place] [address]                 Tel [phone] and Fax [phone]    TRANSTHORACIC ECHOCARDIOGRAM REPORT       Patient Name:      MANUEL Gomez Physician:    [id number] Christal Kellogg MD  Study Date:        9/13/2024            Ordering Provider:    [id number] JONO ESTRADA  MRN/PID:           [id number]             Fellow:  Accession#:        [id number]         Nurse:  Date of Birth/Age: 1960 / 63 years Sonographer:          Joseph White RDCS  Gender:            M                    Additional Staff:  Height:            175.26 cm            Admit Date:  Weight:            67.13 kg             Admission Status:     Inpatient -                                                                 Routine  BSA / BMI:         1.82 m2 / 21.86      Encounter#:           [id number]                     kg/m2  Blood Pressure:    109/65 mmHg          [address]    Study Type:    TRANSTHORACIC ECHO (TTE) LIMITED  Diagnosis/ICD: Encounter for screening for cardiovascular disorders-Z13.6  Indication:    acute decompensated liver disease, pleural effusions  CPT Code:      Echo Limited-70606; Doppler Limited-85988; Color Doppler-57961    Patient History:  Pertinent History: HFpEF, DM, HTN, DLD, alcohol use disorder.    Study Detail: The following Echo studies were performed: M-Mode, 2D, Doppler and                color flow. Technically challenging study due to body habitus,                poor acoustic windows, prominent lung artifact and patient lying                in supine position. Definity used as a contrast agent for                endocardial border definition. Total contrast used for this                procedure was 2.0 mL via IV push.       PHYSICIAN INTERPRETATION:  Left Ventricle: Left ventricular ejection fraction is hyperdynamic, calculated by Franco's biplane at 78%. The left ventricular cavity size is decreased. Left ventricular diastolic filling was indeterminate. Patient with significant tachycardia during the study.  Left Atrium: The left atrium was not well visualized. There is no evidence of a patent foramen ovale. A bubble study using agitated saline was performed.  Right Ventricle: The right ventricle is normal in size. There is normal right ventricular global systolic function.  Right Atrium: The right atrium is normal in size.  Aortic Valve: The aortic valve was not well visualized. There is trace aortic valve regurgitation.  Mitral Valve: The mitral valve is normal in structure. There is trace mitral valve regurgitation.  Tricuspid Valve: The tricuspid valve is structurally normal. There is mild tricuspid regurgitation. The Doppler estimated RVSP is  slightly elevated at 34.1 mmHg.  Pulmonic Valve: The pulmonic valve was not assessed. Pulmonic valve regurgitation was not assessed.  Pericardium: There is a moderate pericardial effusion. The pericardial effusion is loculated. Seen anteriorly to the right ventricle.  Aorta: The aortic root is normal.  Systemic Veins: The inferior vena cava size appears small, IVC inspiratory collapse greater than 50%.  In comparison to the previous echocardiogram(s): There are no prior studies on this patient for comparison purposes.       CONCLUSIONS:   1. Poorly visualized anatomical structures due to suboptimal image quality.   2. Left ventricular ejection fraction is hyperdynamic, calculated by Franco's biplane at 78%.   3. Left ventricular diastolic filling was indeterminate.   4. Left ventricular cavity size is decreased.   5. There is normal right ventricular global systolic function.   6. There is a moderate pericardial effusion seen anterior to the right ventricle and containining loculated material   7. Slightly elevated right ventricular systolic pressure.   8. There is no evidence of a patent foramen ovale.    QUANTITATIVE DATA SUMMARY:     2D MEASUREMENTS:          Normal Ranges:  IVSd:            1.00 cm  (0.6-1.1cm)  LVPWd:           0.80 cm  (0.6-1.1cm)  LVIDd:           3.30 cm  (3.9-5.9cm)  LVIDs:           2.70 cm  LV Mass Index:   45 g/m2  LVEDV Index:     34 ml/m2  LV % FS          18.2 %       LV SYSTOLIC FUNCTION BY 2D PLANIMETRY (MOD):                       Normal Ranges:  EF-A4C View:    83 % (>=55%)  EF-A2C View:    73 %  EF-Biplane:     78 %  LV EF Reported: 78 %       RIGHT VENTRICLE:  RV s' 0.20 m/s       TRICUSPID VALVE/RVSP:          Normal Ranges:  Peak TR Velocity:     2.79 m/s  RV Syst Pressure:     34 mmHg  (< 30mmHg)       [id number] Christal Kellogg MD  Electronically signed on 9/13/2024 at 1:14:31 PM       ** Final **  XR chest 1 view  Narrative: Interpreted By:  Cr Mendez,  and Carlos Hardy    STUDY:  XR CHEST 1 VIEW;  9/13/2024 2:47 am      INDICATION:  Signs/Symptoms:b/l pleural effusion.      COMPARISON:  Chest radiograph 09/08/2024      ACCESSION NUMBER(S):  [id number]      ORDERING CLINICIAN:  MAYRA HAYWOOD      FINDINGS:  AP radiograph of the chest was provided.          CARDIOMEDIASTINAL SILHOUETTE:  Cardiomediastinal silhouette is normal in size and configuration.      LUNGS:  Hazy opacity within the left lower lung. Aeration of the left  costophrenic angle is obscured by overlying medical device. Subtle  hazy opacity in the right lower lung. The right costophrenic angle is  clear. No evidence of pneumothorax.      ABDOMEN:  No remarkable upper abdominal findings.      BONES:  No acute osseous changes.      Impression: 1.  Hazy opacity within the left lower lung, which could represent a  small left-sided pleural effusion versus atelectasis. Correlate with  fluid status.  2. Subtle hazy opacity within the right lower lung, which could  represent small pleural effusion versus atelectasis versus  combination.      I personally reviewed the images/study and resident's interpretation  and I agree with the findings as stated by Hayley Gonzalez MD (resident  radiologist). This study was analyzed and interpreted at [place], [place].      MACRO:  None      Signed by: Cr Mendez 9/13/2024 9:17 AM  Dictation workstation:   [id number]      Physical Exam  Constitutional:       General: He is not in acute distress.     Appearance: He is not diaphoretic.   HENT:      Head: Normocephalic.      Mouth/Throat:      Mouth: Mucous membranes are moist.   Cardiovascular:      Rate and Rhythm: Normal rate and regular rhythm.      Heart sounds: No murmur heard.  Pulmonary:      Effort: No respiratory distress.      Breath sounds: No rhonchi or rales.   Abdominal:      General: There is no distension.      Palpations: There is no mass.      Tenderness: There is no  abdominal tenderness. There is no guarding or rebound.   Musculoskeletal:         General: No swelling.      Right lower leg: No edema.      Left lower leg: No edema.   Skin:     Capillary Refill: Capillary refill takes less than 2 seconds.      Coloration: Skin is pale. Skin is not jaundiced.      Findings: No bruising.   Neurological:      General: No focal deficit present.      Mental Status: He is alert and oriented to person, place, and time.      Comments: Appeared a bit delirious       Relevant Results         Results for orders placed or performed during the hospital encounter of 09/13/24 (from the past 24 hour(s))   POCT GLUCOSE   Result Value Ref Range    POCT Glucose 97 74 - 99 mg/dL   Coagulation Screen   Result Value Ref Range    Protime 32.4 (H) 9.8 - 12.8 seconds    INR 2.8 (H) 0.9 - 1.1    aPTT 125 (HH) 27 - 38 seconds   Hepatitis A Antibody, IgM   Result Value Ref Range    Hepatitis A  AB- IgM Nonreactive Nonreactive   Hepatitis B Surface Antigen   Result Value Ref Range    Hepatitis B Surface AG Nonreactive Nonreactive   Hepatitis B Core Antibody, IgM   Result Value Ref Range    Hepatitis B Core AB; IgM Nonreactive Nonreactive   Hepatitis C Antibody   Result Value Ref Range    Hepatitis C AB Nonreactive Nonreactive   HCV PCR with Genotype Reflex   Result Value Ref Range    Hepatitis C RNA PCR Log      Hepatitis C PCR with Genotype Reflex Ordered       HCV viral load not detected, genotyping will not be performed.    HCV RNA Result Not Detected Not detected   Alpha-1-Antitrypsin   Result Value Ref Range    Alpha-1 Antitrypsin 167 84 - 218 mg/dL   Ceruloplasmin   Result Value Ref Range    Ceruloplasmin 31.0 20.0 - 60.0 mg/dL   Fibrinogen   Result Value Ref Range    Fibrinogen 203 200 - 400 mg/dL   TSH with reflex to Free T4 if abnormal   Result Value Ref Range    Thyroid Stimulating Hormone 0.87 0.44 - 3.98 mIU/L   Haptoglobin   Result Value Ref Range    Haptoglobin <30 (L) 30 - 200 mg/dL   Blood Gas  Venous Full Panel   Result Value Ref Range    POCT pH, Venous 7.40 7.33 - 7.43 pH    POCT pCO2, Venous 39 (L) 41 - 51 mm Hg    POCT pO2, Venous 42 35 - 45 mm Hg    POCT SO2, Venous 61 45 - 75 %    POCT Oxy Hemoglobin, Venous 60.2 45.0 - 75.0 %    POCT Hematocrit Calculated, Venous 10.0 (L) 41.0 - 52.0 %    POCT Sodium, Venous 142 136 - 145 mmol/L    POCT Potassium, Venous 4.1 3.5 - 5.3 mmol/L    POCT Chloride, Venous 109 (H) 98 - 107 mmol/L    POCT Ionized Calicum, Venous 1.09 (L) 1.10 - 1.33 mmol/L    POCT Glucose, Venous 99 74 - 99 mg/dL    POCT Lactate, Venous 4.8 (HH) 0.4 - 2.0 mmol/L    POCT Base Excess, Venous -0.6 -2.0 - 3.0 mmol/L    POCT HCO3 Calculated, Venous 24.2 22.0 - 26.0 mmol/L    POCT Hemoglobin, Venous 3.3 (LL) 13.5 - 17.5 g/dL    POCT Anion Gap, Venous 13.0 10.0 - 25.0 mmol/L    Patient Temperature 37.0 degrees Celsius    FiO2 21 %   Hepatic Function Panel   Result Value Ref Range    Albumin 1.7 (L) 3.4 - 5.0 g/dL    Bilirubin, Total 2.7 (H) 0.0 - 1.2 mg/dL    Bilirubin, Direct 1.3 (H) 0.0 - 0.3 mg/dL    Alkaline Phosphatase 804 (H) 33 - 136 U/L     (H) 10 - 52 U/L     (H) 9 - 39 U/L    Total Protein 4.9 (L) 6.4 - 8.2 g/dL   Ferritin   Result Value Ref Range    Ferritin >9,000 (H) 20 - 300 ng/mL   HSV by PCR, Qualitative Whole Blood   Result Value Ref Range    HSV-1 Whole Blood Not Detected Not Detected    HSV-2 Whole blood Not Detected Not Detected   Phosphorus   Result Value Ref Range    Phosphorus 2.5 2.5 - 4.9 mg/dL   Basic Metabolic Panel   Result Value Ref Range    Glucose 99 74 - 99 mg/dL    Sodium 143 136 - 145 mmol/L    Potassium 4.0 3.5 - 5.3 mmol/L    Chloride 106 98 - 107 mmol/L    Bicarbonate 24 21 - 32 mmol/L    Anion Gap 17 10 - 20 mmol/L    Urea Nitrogen 8 6 - 23 mg/dL    Creatinine 0.85 0.50 - 1.30 mg/dL    eGFR >90 >60 mL/min/1.73m*2    Calcium 7.6 (L) 8.6 - 10.6 mg/dL   Lipase   Result Value Ref Range    Lipase 13 9 - 82 U/L   Lactate Dehydrogenase   Result Value  Ref Range     (H) 84 - 246 U/L   Acetaminophen   Result Value Ref Range    Acetaminophen <10.0 10.0 - 30.0 ug/mL   Iron and TIBC   Result Value Ref Range    Iron 85 35 - 150 ug/dL    UIBC <55 (L) 110 - 370 ug/dL    TIBC      % Saturation     Triglycerides   Result Value Ref Range    Triglycerides 106 0 - 149 mg/dL   Alpha-Fetoprotein   Result Value Ref Range    Alpha-Fetoprotein 4 0 - 9 ng/mL   CBC and Auto Differential   Result Value Ref Range    WBC 10.1 4.4 - 11.3 x10*3/uL    nRBC 0.2 (H) 0.0 - 0.0 /100 WBCs    RBC 3.36 (L) 4.50 - 5.90 x10*6/uL    Hemoglobin 9.5 (L) 13.5 - 17.5 g/dL    Hematocrit 26.4 (L) 41.0 - 52.0 %    MCV 79 (L) 80 - 100 fL    MCH 28.3 26.0 - 34.0 pg    MCHC 36.0 32.0 - 36.0 g/dL    RDW 14.9 (H) 11.5 - 14.5 %    Platelets 112 (L) 150 - 450 x10*3/uL    Neutrophils % 84.0 40.0 - 80.0 %    Immature Granulocytes %, Automated 0.5 0.0 - 0.9 %    Lymphocytes % 10.7 13.0 - 44.0 %    Monocytes % 4.6 2.0 - 10.0 %    Eosinophils % 0.1 0.0 - 6.0 %    Basophils % 0.1 0.0 - 2.0 %    Neutrophils Absolute 8.49 (H) 1.20 - 7.70 x10*3/uL    Immature Granulocytes Absolute, Automated 0.05 0.00 - 0.70 x10*3/uL    Lymphocytes Absolute 1.08 (L) 1.20 - 4.80 x10*3/uL    Monocytes Absolute 0.47 0.10 - 1.00 x10*3/uL    Eosinophils Absolute 0.01 0.00 - 0.70 x10*3/uL    Basophils Absolute 0.01 0.00 - 0.10 x10*3/uL   Type and screen   Result Value Ref Range    ABO TYPE A     Rh TYPE POS     ANTIBODY SCREEN NEG    Margarita-Shook PCR, Quant,Plasma   Result Value Ref Range    EBV DNA Result <35 Detected (A) Not Detected    EBV PCR Plasma Log     Pathologist Review-CBC Differential   Result Value Ref Range    Pathologist Review-CBC Differential       Mild microcytosis with many target cells and many Pappenheimer bodies. Findings consistent with hemoglobinopathy.   Drug Screen, Urine   Result Value Ref Range    Amphetamine Screen, Urine Presumptive Negative Presumptive Negative    Barbiturate Screen, Urine Presumptive  Negative Presumptive Negative    Benzodiazepines Screen, Urine Presumptive Negative Presumptive Negative    Cannabinoid Screen, Urine Presumptive Negative Presumptive Negative    Cocaine Metabolite Screen, Urine Presumptive Negative Presumptive Negative    Fentanyl Screen, Urine Presumptive Negative Presumptive Negative    Opiate Screen, Urine Presumptive Positive (A) Presumptive Negative    Oxycodone Screen, Urine Presumptive Positive (A) Presumptive Negative    PCP Screen, Urine Presumptive Negative Presumptive Negative    Methadone Screen, Urine Presumptive Negative Presumptive Negative   VERIFY ABO/Rh Group Test   Result Value Ref Range    ABO TYPE A     Rh TYPE POS    TEG Clot Global Profile   Result Value Ref Range    R (Reaction Time) K >17.0 (H) 4.6 - 9.1 min    K (Clot Kinetics)      ANGLE <39.0 (L) 63.0 - 78.0 deg    MA (Max Amplitude) K <40.0 (L) 52.0 - 69.0 mm    R (Reaction Time) KH 11.9 (H) 4.3 - 8.3 min    MA (Max Amplitude) RT 65.0 52.0 - 70.0 mm    MA ( Gracy Amplitude) FF 23.0 15.0 - 32.0 mm    FLEV 423 278 - 581 mg/dL   POCT GLUCOSE   Result Value Ref Range    POCT Glucose 100 (H) 74 - 99 mg/dL   Creatine Kinase   Result Value Ref Range    Creatine Kinase 261 0 - 325 U/L   Transthoracic Echo (TTE) Limited   Result Value Ref Range    LV EF 78 %    RV free wall pk S' 19.80 cm/s    LVIDd 3.30 cm    RVSP 34.1 mmHg    LV A4C EF 82.8    POCT GLUCOSE   Result Value Ref Range    POCT Glucose 118 (H) 74 - 99 mg/dL   POCT GLUCOSE   Result Value Ref Range    POCT Glucose 111 (H) 74 - 99 mg/dL      Assessment/Plan      Assessment & Plan  Alcoholic liver failure (Multi)    Manuel Bowser is a 62 yo male patient with PMHx of DM, HTN, HFpEF, alcohol use disorder, pancreatitis, who presented yesterday as a transfer from [facility] ICU for workup of acute liver failure and evaluation for liver transplant. He was started on Lactulose from OSH and had acetylcysteine since his admission to  MICU. Was said to be  encephalopathic at OSH but was AOX4 upon arrival to  MICU. Hepatology was consulted and would appreciate their recs.    #Acute liver injury/failure, possible acute on chronic liver disease  #HE  #Coagulopathy  -Hx of chronic alcohol use disorder  -Patient presents with acute liver injury with INR >9.5 +/- failure given reported encephalopathy with A&Ox2 at OSH. Currently A&Ox4, no asterixis or c/f current encephalopathy though has been treated with lactulose  -Hepatic function panel severely deranged-Alb-1.7,ALP-804,ALT-278,AST-945  -Hepatitis panel, A1AT, Ceruloplasmin,HSV1&2, Acetaminophen level,AFP,Fibrinogen are all within normal limits  -Ferritin>9000,serum iron-85 WNL  -EBV DNA<35 detected  -AMA-29.3 (uln 24.9, >25 is positive)   -liver US shows echogenic appearance of the liver compatible with steatosis and normal Doppler interrogation of the hepatic vasculature. There is mild abdominal ascites and a right pleural effusion   -S/p IV Vit K x1 9/11, 1u FFP 9/12 with INR >9.5, now 2.8,aPTT-125.  -had Acytylcysteine in the MICU  Plan-  -Hepatology following, appreciate recs  -will follow up on pending hepatic work up  -continue with lactulose, titrating to 3-4 BM/day   -Monitor Hgb, Coags, signs of bleed  -Daily MELD labs    #Acute anaemia/concern for hemolysis  -Hgb 12.0 to 9.5   -Tbili-2.7, indirect-1.4, LDH-846, Haptoglobin<30  Plan-  -follow up peripheral blood smear  -monitor Hb    # ?Traumatic catheterization  -Difficult catheterization at MICU with bleeding.  -hasn't made any significant urine since then.  -bladder scan showed 140mls of Urine  Plan-  -bladder scan this evening to rule out retention of urine  -urology to pass catheter if there are concerns for retention    #HFpEF  -limited ECHO -9/13 shows EF of 78%  -CXR without signs of pulmonary edema  -on home metop 25mg bid    FEN  Fluids: PRN  Electrolytes: PRN  Nutrition: 2g restricted  Prophylaxis:  DVT ppx: chem ppx held iso coagulopathy,  SCDs  GI ppx: PPI  Bowel care: lactulose  Hardware:                        Social:  Code: Full Code    HPOA: Mayela (wife) [phone]   Disposition: Floor    Plan is not final until reviewed by Attending-DO Ortiz Jurado MD  PGY-1

## 2024-09-13 NOTE — CONSULTS
[facility]   [facility] [place]  INITIAL CONSULT NOTE       Reason For Consult  Acute liver injury    SUBJECTIVE     History Of Present Illness  Manuel Bowser is a 63 y.o. male with a past medical history of HFpEF, DM, chronic pancreatitis, HTN, DLD, AUD admitted on 9/13/2024 as transfer from [facility] ICU for workup of abnormal LFTs . Hepatology is consulted for Acute liver injury.     Patient initially presented to [facility] on 9/8/24 after mechanical fall with confusion prior to fall. Has been having lightheadedness with positional changes. On admission found to have elevated LFTs , , ALT 61, Tbili 1.8, Albumin 2.1, Plt 165, WBC 8.0 LFTs; 9/10/24 INR 2.8 labs remained stable until 9/11 ALP 1,111, , , Albumin 1.7, Plt 155, WBC 7.7, INR >9.5, ammonia 67; 9/12/24 , AST 1,866, , Tbili 2.6, Bili direct 0.7, Alb 1.9, Plt 117, WBC 8.8, INR >9.5. Given 1 unit of FFP.     Admitted to [facility] 8/12-8/20/24 to [facility] with chest pain and syncope. He had rib fractures and mild pleural effusions and required thoracentesis 8/14 yielding 550 ml (thought to be 2/2 PNA). Treated for PNA with CTX & azithromycin. Cardiac perfusion stress test negative for ischemia with LVEF 75%. Cardiology at [facility] noted no acute cardiac dysfunction or ischemia, high sensitive troponin at that time was 84 suspected demand mediated. Syncope was suspected to be due to orthostatic hypotension.     Patient reports last drink was 2 weeks ago per patient.      Review of Systems  Negative but noted in HPI        Past Medical History:  No past medical history on file.    Home Medications  Medications Prior to Admission   Medication Sig Dispense Refill Last Dose    aspirin 81 mg chewable tablet Chew 1 tablet (81 mg) once daily.       atorvastatin (Lipitor) 40 mg tablet Take 1 tablet (40 mg) by mouth once daily at bedtime.       levothyroxine (Synthroid, Levoxyl) 125 mcg tablet Take  1 tablet (125 mcg) by mouth early in the morning.. Take on an empty stomach at the same time each day, either 30 to 60 minutes prior to breakfast       pantoprazole (ProtoNix) 40 mg EC tablet Take 1 tablet (40 mg) by mouth once daily in the morning. Take before meals. Do not crush, chew, or split.       thiamine 100 mg tablet Take 1 tablet (100 mg) by mouth once daily.            Surgical History:  No past surgical history on file.    Allergies:  Not on File    Social History:    Social History     Socioeconomic History    Marital status: Unknown     Spouse name: Not on file    Number of children: Not on file    Years of education: Not on file    Highest education level: Not on file   Occupational History    Not on file   Tobacco Use    Smoking status: Never    Smokeless tobacco: Never   Substance and Sexual Activity    Alcohol use: Not on file    Drug use: Not on file    Sexual activity: Not on file   Other Topics Concern    Not on file   Social History Narrative    Not on file     Social Determinants of Health     Financial Resource Strain: Patient Declined (9/13/2024)    Overall Financial Resource Strain (CARDIA)     Difficulty of Paying Living Expenses: Patient declined   Food Insecurity: No Food Insecurity (8/13/2024)    Received from [facility]    Hunger Vital Sign     Worried About Running Out of Food in the Last Year: Never true     Ran Out of Food in the Last Year: Never true   Transportation Needs: No Transportation Needs (9/13/2024)    PRAPARE - Transportation     Lack of Transportation (Medical): No     Lack of Transportation (Non-Medical): No   Physical Activity: Not on file   Stress: Not on file   Social Connections: Not on file   Intimate Partner Violence: Not on file   Housing Stability: Low Risk  (9/13/2024)    Housing Stability Vital Sign     Unable to Pay for Housing in the Last Year: No     Number of Times Moved in the Last Year: 1     Homeless in the Last Year: No       Family History:  No  family history on file.    EXAM     Vitals:    Vitals:    09/13/24 0700 09/13/24 0800 09/13/24 0900 09/13/24 1155   BP: 119/82 (!) 134/101 (!) 130/91    Pulse: (!) 140 (!) 136 (!) 118    Resp: 24 25 19    Temp:  36.5 °C (97.7 °F)  36.1 °C (97 °F)   TempSrc:  Temporal  Temporal   SpO2: 100% 100% 100%    Weight:       Height:         Failed to redirect to the Timeline version of the [employer].    Intake/Output Summary (Last 24 hours) at 9/13/2024 1215  Last data filed at 9/13/2024 0132  Gross per 24 hour   Intake --   Output 50 ml   Net -50 ml         Physical Exam  General: chronically ill, no acute distress  HEENT: PERRLA, EOM intact, no scleral icterus, poor dentition   Respiratory: normal work of breathing  Cardiovascular: Tachycardic  Abdomen: Soft, nontender, nondistended  Extremities: no edema, no asterixis  Neuro: alert and oriented, moves all 4 extremities with no focal deficits    OBJECTIVE                                                                              Medications       Current Facility-Administered Medications:     acetylcysteine (Acetadote) 3.4 g in dextrose 5% 500 mL infusion, 50 mg/kg, intravenous, Once, Wesly Lou MD, 3.4 g at 09/13/24 1001    acetylcysteine (Acetadote) 6.8 g in dextrose 5% 1,000 mL infusion, 100 mg/kg, intravenous, Once, Wesly Lou MD    aspirin chewable tablet 81 mg, 81 mg, oral, Daily, Wesly Langley MD    pantoprazole (ProtoNix) EC tablet 40 mg, 40 mg, oral, Daily before breakfast, 40 mg at 09/13/24 0600 **OR** esomeprazole (NexIUM) suspension 40 mg, 40 mg, nasoduodenal tube, Daily before breakfast **OR** pantoprazole (ProtoNix) injection 40 mg, 40 mg, intravenous, Daily before breakfast, Wesly Lou MD    ibuprofen tablet 800 mg, 800 mg, oral, PRN, Elle Eddy MD    lactulose 20 gram/30 mL oral solution 20 g, 20 g, oral, TID, Wesly Lou MD    levothyroxine (Synthroid, Levoxyl) tablet 125 mcg, 125 mcg, oral, Daily, Wesly Lou,  MD, 125 mcg at 09/13/24 0537    melatonin tablet 6 mg, 6 mg, oral, Daily, Wesly Lou MD    metoprolol tartrate (Lopressor) tablet 25 mg, 25 mg, oral, BID, Asia Avilez MD, 25 mg at 09/13/24 1026    perflutren lipid microspheres (Definity) injection 0.5-10 mL of dilution, 0.5-10 mL of dilution, intravenous, Once in imaging, Asia Avilez MD                                                                            Labs     Results for orders placed or performed during the hospital encounter of 09/13/24 (from the past 24 hour(s))   POCT GLUCOSE   Result Value Ref Range    POCT Glucose 97 74 - 99 mg/dL   Coagulation Screen   Result Value Ref Range    Protime 32.4 (H) 9.8 - 12.8 seconds    INR 2.8 (H) 0.9 - 1.1    aPTT 125 (HH) 27 - 38 seconds   Hepatitis A Antibody, IgM   Result Value Ref Range    Hepatitis A  AB- IgM Nonreactive Nonreactive   Hepatitis B Surface Antigen   Result Value Ref Range    Hepatitis B Surface AG Nonreactive Nonreactive   Hepatitis B Core Antibody, IgM   Result Value Ref Range    Hepatitis B Core AB; IgM Nonreactive Nonreactive   Hepatitis C Antibody   Result Value Ref Range    Hepatitis C AB Nonreactive Nonreactive   Alpha-1-Antitrypsin   Result Value Ref Range    Alpha-1 Antitrypsin 167 84 - 218 mg/dL   Fibrinogen   Result Value Ref Range    Fibrinogen 203 200 - 400 mg/dL   TSH with reflex to Free T4 if abnormal   Result Value Ref Range    Thyroid Stimulating Hormone 0.87 0.44 - 3.98 mIU/L   Haptoglobin   Result Value Ref Range    Haptoglobin <30 (L) 30 - 200 mg/dL   Blood Gas Venous Full Panel   Result Value Ref Range    POCT pH, Venous 7.40 7.33 - 7.43 pH    POCT pCO2, Venous 39 (L) 41 - 51 mm Hg    POCT pO2, Venous 42 35 - 45 mm Hg    POCT SO2, Venous 61 45 - 75 %    POCT Oxy Hemoglobin, Venous 60.2 45.0 - 75.0 %    POCT Hematocrit Calculated, Venous 10.0 (L) 41.0 - 52.0 %    POCT Sodium, Venous 142 136 - 145 mmol/L    POCT Potassium, Venous 4.1 3.5 - 5.3 mmol/L    POCT  Chloride, Venous 109 (H) 98 - 107 mmol/L    POCT Ionized Calicum, Venous 1.09 (L) 1.10 - 1.33 mmol/L    POCT Glucose, Venous 99 74 - 99 mg/dL    POCT Lactate, Venous 4.8 (HH) 0.4 - 2.0 mmol/L    POCT Base Excess, Venous -0.6 -2.0 - 3.0 mmol/L    POCT HCO3 Calculated, Venous 24.2 22.0 - 26.0 mmol/L    POCT Hemoglobin, Venous 3.3 (LL) 13.5 - 17.5 g/dL    POCT Anion Gap, Venous 13.0 10.0 - 25.0 mmol/L    Patient Temperature 37.0 degrees Celsius    FiO2 21 %   Hepatic Function Panel   Result Value Ref Range    Albumin 1.7 (L) 3.4 - 5.0 g/dL    Bilirubin, Total 2.7 (H) 0.0 - 1.2 mg/dL    Bilirubin, Direct 1.3 (H) 0.0 - 0.3 mg/dL    Alkaline Phosphatase 804 (H) 33 - 136 U/L     (H) 10 - 52 U/L     (H) 9 - 39 U/L    Total Protein 4.9 (L) 6.4 - 8.2 g/dL   Ferritin   Result Value Ref Range    Ferritin >9,000 (H) 20 - 300 ng/mL   HSV by PCR, Qualitative Whole Blood   Result Value Ref Range    HSV-1 Whole Blood Not Detected Not Detected    HSV-2 Whole blood Not Detected Not Detected   Phosphorus   Result Value Ref Range    Phosphorus 2.5 2.5 - 4.9 mg/dL   Basic Metabolic Panel   Result Value Ref Range    Glucose 99 74 - 99 mg/dL    Sodium 143 136 - 145 mmol/L    Potassium 4.0 3.5 - 5.3 mmol/L    Chloride 106 98 - 107 mmol/L    Bicarbonate 24 21 - 32 mmol/L    Anion Gap 17 10 - 20 mmol/L    Urea Nitrogen 8 6 - 23 mg/dL    Creatinine 0.85 0.50 - 1.30 mg/dL    eGFR >90 >60 mL/min/1.73m*2    Calcium 7.6 (L) 8.6 - 10.6 mg/dL   Lipase   Result Value Ref Range    Lipase 13 9 - 82 U/L   Lactate Dehydrogenase   Result Value Ref Range     (H) 84 - 246 U/L   Acetaminophen   Result Value Ref Range    Acetaminophen <10.0 10.0 - 30.0 ug/mL   Iron and TIBC   Result Value Ref Range    Iron 85 35 - 150 ug/dL    UIBC <55 (L) 110 - 370 ug/dL    TIBC      % Saturation     Triglycerides   Result Value Ref Range    Triglycerides 106 0 - 149 mg/dL   Alpha-Fetoprotein   Result Value Ref Range    Alpha-Fetoprotein 4 0 - 9 ng/mL    CBC and Auto Differential   Result Value Ref Range    WBC 10.1 4.4 - 11.3 x10*3/uL    nRBC 0.2 (H) 0.0 - 0.0 /100 WBCs    RBC 3.36 (L) 4.50 - 5.90 x10*6/uL    Hemoglobin 9.5 (L) 13.5 - 17.5 g/dL    Hematocrit 26.4 (L) 41.0 - 52.0 %    MCV 79 (L) 80 - 100 fL    MCH 28.3 26.0 - 34.0 pg    MCHC 36.0 32.0 - 36.0 g/dL    RDW 14.9 (H) 11.5 - 14.5 %    Platelets 112 (L) 150 - 450 x10*3/uL    Neutrophils % 84.0 40.0 - 80.0 %    Immature Granulocytes %, Automated 0.5 0.0 - 0.9 %    Lymphocytes % 10.7 13.0 - 44.0 %    Monocytes % 4.6 2.0 - 10.0 %    Eosinophils % 0.1 0.0 - 6.0 %    Basophils % 0.1 0.0 - 2.0 %    Neutrophils Absolute 8.49 (H) 1.20 - 7.70 x10*3/uL    Immature Granulocytes Absolute, Automated 0.05 0.00 - 0.70 x10*3/uL    Lymphocytes Absolute 1.08 (L) 1.20 - 4.80 x10*3/uL    Monocytes Absolute 0.47 0.10 - 1.00 x10*3/uL    Eosinophils Absolute 0.01 0.00 - 0.70 x10*3/uL    Basophils Absolute 0.01 0.00 - 0.10 x10*3/uL   Type and screen   Result Value Ref Range    ABO TYPE A     Rh TYPE POS     ANTIBODY SCREEN NEG    Drug Screen, Urine   Result Value Ref Range    Amphetamine Screen, Urine Presumptive Negative Presumptive Negative    Barbiturate Screen, Urine Presumptive Negative Presumptive Negative    Benzodiazepines Screen, Urine Presumptive Negative Presumptive Negative    Cannabinoid Screen, Urine Presumptive Negative Presumptive Negative    Cocaine Metabolite Screen, Urine Presumptive Negative Presumptive Negative    Fentanyl Screen, Urine Presumptive Negative Presumptive Negative    Opiate Screen, Urine Presumptive Positive (A) Presumptive Negative    Oxycodone Screen, Urine Presumptive Positive (A) Presumptive Negative    PCP Screen, Urine Presumptive Negative Presumptive Negative    Methadone Screen, Urine Presumptive Negative Presumptive Negative   VERIFY ABO/Rh Group Test   Result Value Ref Range    ABO TYPE A     Rh TYPE POS    TEG Clot Global Profile   Result Value Ref Range    R (Reaction Time) K >17.0  (H) 4.6 - 9.1 min    K (Clot Kinetics)      ANGLE <39.0 (L) 63.0 - 78.0 deg    MA (Max Amplitude) K <40.0 (L) 52.0 - 69.0 mm    R (Reaction Time) KH 11.9 (H) 4.3 - 8.3 min    MA (Max Amplitude) RT 65.0 52.0 - 70.0 mm    MA ( Gracy Amplitude) FF 23.0 15.0 - 32.0 mm    FLEV 423 278 - 581 mg/dL   POCT GLUCOSE   Result Value Ref Range    POCT Glucose 100 (H) 74 - 99 mg/dL   Creatine Kinase   Result Value Ref Range    Creatine Kinase 261 0 - 325 U/L   POCT GLUCOSE   Result Value Ref Range    POCT Glucose 118 (H) 74 - 99 mg/dL                                                                              Imaging           9/11/24 US Liver w/Dopplers  FINDINGS:     Color and spectral Doppler evaluation of the hepatic vessels and portal circulation demonstrates normal direction and amplitude of blood flow within the left, right, and main portal veins, as well as within the left, right, and middle hepatic veins.  Normal flow is also present within the splenic vein, main hepatic artery, and inferior vena cava.     There is perihepatic ascites and a right-sided pleural effusion. The gallbladder contains sludge. The liver parenchyma is diffusely echogenic suggesting diffuse liver disease such as steatosis.       8/12/24 CT AP w/IV Contrast   IMPRESSION:   Rectal wall thickening and mucosal hyperenhancement and perirectal fat   stranding.  Findings suspicious for acute proctitis.  There also appear   to be internal rectal hemorrhoids present.     New small volume of ascites in the abdomen and pelvis, nonspecific.     Liver is diffusely steatotic.     Sequela of chronic pancreatitis redemonstrated with unchanged pancreatic   ductal dilatation.  No acute peripancreatic inflammation.     12/18/2020 US Abdomen   The liver is normal in echotexture.  No hyper or hypo echoic masses   are seen.  There is no intrahepatic biliary ductal dilatation.     1. Pancreatic calcifications with dilated pancreatic duct suggesting   changes of chronic  pancreatitis.     2. 2.9 cm nodular area right suprarenal location could represent an   enlarged adrenal gland or lymph node --- this could be further   evaluated with CT as clinically dictated.     3. Tiny left renal medullary calcification possibly calculus without   hydronephrosis. 9 mm right renal cortical cyst.                                                                          GI Procedures     None            ASSESSMENT / PLAN                  ASSESSMENT/PLAN:  Manuel Bowser is a 63 y.o. male with a past medical history of HFpEF, DM, chronic pancreatitis, HTN, DLD, AUD admitted on 9/13/2024 as transfer from [facility] ICU for workup of abnormal LFTs . Hepatology is consulted for Acute liver injury.     His LFTs on presentation to [facility] were cholestatic in nature with elevated INR to 2.8 on 9/10 however appears that something acutely occurred from 9/10 to 9/11 as patients LFTs increased significantly and INR >9.5 his bilirubin appears to be largely indirect at this point. Etiology of his abnormal LFTs is unclear at this point, he very likely at baseline has alcohol associated liver disease, his patter of injury at this point is mixed and does not fit with alcohol hepatitis. He has not had any recent antibiotics or medications (reviewed previous admission medications from 8/2024) that would suggest DILI. His pattern is atypical and, given his INR >9.5, Ferritin >9000 and labs consistent with hemolysis this may be related to hematologic or rheumatologic etiology in origin. At this time he does not appear to have liver failure with no asterixis or encephalopathy on exam.     - Ultrasound Liver with dopplers patent vasculature   - Hepatitis A virus IgG and IgM negative 9/10/24  - Hepatitis B surface antigen  negative 9/10/24  - Hepatitis B core Ab IgM/IgG  negative 9/10/24  - Hep C antibody   negative 9/13/24  - Alpha fetoprotein   4 9/13/24  - Ceruloplasmin   PENDING   - Alpha-1 - antitrypsin phenotype PENDING    - HIV     negative 9/11/24  - VDRL     NOT ORDERED   - MICHAEL     PENDING   - Anti Smooth Muscle Antibody PENDING   - IgG      PENDING   - LKM Ab     PENDING   - Anti Mitochondrial antibody  29.3 (uln 24.9, >25 is positive)   - Iron studies with Ferritin   Iron 85; ferritin >9000 9/13/24  - HSV     Negative   - EBV     PENDING   - PETH      PENDING     #Acute Liver Injury  -Daily MELD labs   -Patient may need liver biopsy on Monday depending on course   -Acute liver injury labs pending     Patient was seen & discussed with Dr. Ansari     Thank you for the consultation. Hepatology will continue to the follow.  - During weekday hours of 7am- 5pm, please do not hesitate to contact me on [employer] Chat or page [phone] if there are any further questions   - After hours, on weekends, and on holidays, please page the [place]-call GI fellow at [phone]. Thank you.       Ale Haynes MD  PGY4 Gastroenterology Fellow  [facility] [place]

## 2024-09-13 NOTE — PROGRESS NOTES
Pharmacy Medication History Review    Manuel Bowser is a 63 y.o. male admitted for Alcoholic liver failure (Multi). Pharmacy reviewed the patient's [id number] medications and allergies for accuracy.    Medications ADDED:  Aspirin, atorvastatin, levothyroxine, pantoprazole, thiamine  Medications CHANGED:  none  Medications REMOVED:   none     The list below reflects the updated PTA list.   Prior to Admission Medications   Prescriptions Last Dose Informant Patient Reported? Taking?   aspirin 81 mg chewable tablet  Self Yes No   Sig: Chew 1 tablet (81 mg) once daily.   atorvastatin (Lipitor) 40 mg tablet  Self Yes No   Sig: Take 1 tablet (40 mg) by mouth once daily at bedtime.   levothyroxine (Synthroid, Levoxyl) 125 mcg tablet  Self Yes No   Sig: Take 1 tablet (125 mcg) by mouth early in the morning.. Take on an empty stomach at the same time each day, either 30 to 60 minutes prior to breakfast   pantoprazole (ProtoNix) 40 mg EC tablet  Self Yes No   Sig: Take 1 tablet (40 mg) by mouth once daily in the morning. Take before meals. Do not crush, chew, or split.   thiamine 100 mg tablet  Self Yes No   Sig: Take 1 tablet (100 mg) by mouth once daily.      Facility-Administered Medications: None        The list below reflects the updated allergy list. Please review each documented allergy for additional clarification and justification.  Allergies  Never Reviewed   Not on File         Patient declines M2B at discharge.     Sources:   -patient interview (knew some details about medications;  could confirm if taking or not when read off list of meds filled recently at [facility] pharmacy and could say how many tablets a day he took)  -attempted to call wife Mayela for additional confirmation of home meds but phone number listed is disconnected ([phone])  -outpatient pharmacy dispense history-  called [facility] pharmacy to confirm tablet strengths, directions, and complete list of recently filled medications.  Patient  "said he only goes to [facility] on [address] in [place]  -Care Everywhere medication lists  -[facility]        Additional Comments:  Atorvastatin 40mg daily-  patient reports he was told by his doctor to stop lipitor a few weeks ago, it was last filled at pharmacy on 8/20/24 x 30 day supply.  He did not know the reason.  Could not confirm if this was temporary or permanent stop so left on home med list with note that patient was told to stop few weeks ago      Cole Li, PharmD  Transitions of Care Pharmacist  09/13/24     Secure Chat preferred   If no response call [phone] or [employer] \"Med Rec\"    "

## 2024-09-13 NOTE — PROGRESS NOTES
ICU to [place] Transfer Summary     I:  ICU Admission Reason & Brief ICU Course:    Patient was transferred from [facility] ICU to MICU with no ICU needs at this time. Manuel Bowser is a 64 yo male patient with PMHx of DM, HTN, HFpEF, alcohol use disorder, pancreatitis, who presented yesterday as a transfer from [facility] for workup of acute decompensated heart failure and evaluation for liver transplant. He initially presented for SOB, chest pain and abdominal pain, diagnosed there with pneumonia. He was found to have increased LFTs, became encephalopathic and admitted to [facility] ICU. Received lactulose there, FFP and vit K for coagulopathy. Transferred to [facility] for continuity of care. Yesterday on admission to the MICU, he was alert and oriented x 4. He still reports chest pain, SOB, and abdominal pain, however, he note pain has improved. LFTs upon arrival were , , alk phos 800, jocelynn total 2.7 direct 1.3, INR 2.8, alb 1.7, normal creat. US indicated mild ascites. The patient is stable and does not require ICU needs. Hepatology consulted and ordered workup.     C: Code Status/DPOA Info/Goals of Care/ACP Note    Full Code  DPOA/Contact Number: Mayela (wife) [phone]     U: Unprescribing & Pertinent High-Risk Medications    Changes to home meds: DC tylenol and atorvastatin; avoid hepatoxic medication      Anticoagulation: None, SCDs    Antibiotics:   [x] N/A - no current planned antimicrobioals      P: Pending Tests at the Time of Transfer   Echocardiogram      A: Active consultants, including Rehab:   []  Subspecialty Consultants: gastroenterology  []  PT  []  OT  []  SLP  []  Wound Care    U: Uncertainty Measure/Diagnostic Pause:    Working diagnosis at the time of transfer acute liver injury vs failure, though ddx includes acute on chronic liver failure      Diagnosis Degree of Certainty: 2. Some uncertainty about the clinical diagnosis.     S: Summary of Major Problems and To-Dos:    NEUROLOGY/PSYCH:  Dx:  Hepatic encephalopathy   Reported HE at OSH per intake form and A&Ox2, no notes available in paper chart transferred with patient. Now on lactulose, A&O x4, unclear picture regarding HE  Management:  Continue with lactulose, titrating to 3-4 BM/day     CARDIOVASCULAR:  Dx:  HFpEF  Tachycardia  HFpEF per chart though no further details available  Had TTE performed at OSH with indication of chest pain  Rates 120, appears to be sinus on tele  Management:  Ordered TTE  Started metoprolol tartrate 25mg BID      PULMONARY:  Dx:  Pleural effusion  Noted on liver US  Pneumonia  Patient reports being diagnosed with pneumonia, no antibiotics or procal based on MAR/labs  No obvious infiltrates or consolidations on 9/8 CXR  Management:  CXR to evaluate pleural effusion, does not appear to be significant effusion on 9/8 CXR  Monitor O2 requirement, currently on room air     RENAL/GENITOURINARY:  Dx:  No active issues  Upon replacing butts, was noted to have potential hematuria and replacement butts unable to be passed  Management:  Monitor Cr, renal function  UA ordered  Consider urology consult in AM for butts placement with c/f potential hematuria and clots     GASTROENTEROLOGY:  Dx:  Acute liver injury vs failure  Chronic pancreatitis  hx of chronic alcohol use disorder  Management:  Ordered over read of liver US with doppler  Fu liver stamp, Peth  Consider MRCP, paracentesis. If significant ascites found on US read/POCUS, low threshold to start empiric SBP tx with CTX 2g, albumin  Fu UA with reflex for infectious w/u  Cw lactulose, titrating to 3-4 BM per day  Hepatology consult in the morning  Consider steroids if felt to be acute alcohol related hepatitis and MDF>32     ENDOCRINOLOGY:  Dx:  No active issues  Management:  None     HEMATOLOGY:  Dx:  C/f hemolysis  Given marked increase in AST and primarily indirect hyperbilirubinemia along with drop in Hgb 12.0 to 9.5, some c/f hemolysis with some  makeda blood seen while attempting to exchange butts  Coagulopathy  INR >9.5, s/p 1u FFP, 1u Vit K  Likely 2/2 underlying liver disease  Management:  T&S, fibrinogen, coags ordered  Monitor Hgb, INR, signs of bleed  TEG, peripheral smear ordered     SKIN:  Dx:  Skin Failure No  Management:  None     MUSCULOSKELETAL:  Dx:  No active issues  Management:  None     INFECTIOUS DISEASE:  Dx:  No active issues  UA with reflex micro, culture ordered as part of acute on chronic liver failure w/u  Pneumonia, no current c/f pneumonia  Given abdominal pain with ascites seen on OSH liver US, cannot rule out SBP at this time but less of a concern currently  Management:  Can consider paracentesis     To-do list prior to transfer:  []Follow up Liver stamp and PETH   []Follow up on TTE      E: Exam, including Lines/Drains/Airways & Data Review:   Difficult airway? N/A  Lines/drains assessed for removal? No    Within 30 minutes of the patient physically leaving the floor, a Floor Readiness Note needs to be placed with updated vitals.

## 2024-09-13 NOTE — PROGRESS NOTES
Manuel Ingram is a 63 y.o. male on day 0 of admission presenting with Alcoholic liver failure (Multi).      Subjective   NAEON. Says he accidentally urinated and didn't have any discomfort. Wife on phone said he visited outside hospital because of dizziness and falls which which have been going on since the beginning of last year. She also said he is mostly mostly in pain from injuries sustained during falls. Patient is also said to be disoriented for some month now and stopped taking alcohol for about 4 months since he was told his liver was not doing well. Wife admitted to episodes of body swelling including the feet/hands and abdomen.He is said to have presented to [facility] with discomfort in his chest and abdomen, as well as dizziness and falls.Wife says he hasn't started any new meds and doesn't take any herbal preparations.    Objective     Last Recorded Vitals  BP 87/63   Pulse 86   Temp 36.1 °C (97 °F) (Temporal)   Resp 17   Wt 67.5 kg (148 lb 13 oz)   SpO2 100%   Intake/Output last 3 Shifts:    Intake/Output Summary (Last 24 hours) at 9/13/2024 1626  Last data filed at 9/13/2024 1424  Gross per 24 hour   Intake 1802 ml   Output 50 ml   Net 1752 ml       Admission Weight  Weight: 67.5 kg (148 lb 13 oz) (09/13/24 0100)    Daily Weight  09/13/24 : 67.5 kg (148 lb 13 oz)    Image Results  Transthoracic Echo (TTE) Limited     [facility], [address] [place] [address]                 Tel [phone] and Fax [phone]    TRANSTHORACIC ECHOCARDIOGRAM REPORT       Patient Name:      MANUEL INGRAM     Reading Physician:    [id number] Christal Kellogg MD  Study Date:        9/13/2024            Ordering Provider:    [id number] JONO ESTRADA  MRN/PID:           [id number]             Fellow:  Accession#:        [id number]         Nurse:  Date of Birth/Age: 1960 / 63 years Sonographer:          Joseph White                                                                 [facility]  Gender:            M                    Additional Staff:  Height:            175.26 cm            Admit Date:  Weight:            67.13 kg             Admission Status:     Inpatient -                                                                Routine  BSA / BMI:         1.82 m2 / 21.86      Encounter#:           [id number]                     kg/m2  Blood Pressure:    109/65 mmHg          [address]    Study Type:    TRANSTHORACIC ECHO (TTE) LIMITED  Diagnosis/ICD: Encounter for screening for cardiovascular disorders-Z13.6  Indication:    acute decompensated liver disease, pleural effusions  CPT Code:      Echo Limited-07272; Doppler Limited-32264; Color Doppler-15833    Patient History:  Pertinent History: HFpEF, DM, HTN, DLD, alcohol use disorder.    Study Detail: The following Echo studies were performed: M-Mode, 2D, Doppler and                color flow. Technically challenging study due to body habitus,                poor acoustic windows, prominent lung artifact and patient lying                in supine position. Definity used as a contrast agent for                endocardial border definition. Total contrast used for this                procedure was 2.0 mL via IV push.       PHYSICIAN INTERPRETATION:  Left Ventricle: Left ventricular ejection fraction is hyperdynamic, calculated by Franco's biplane at 78%. The left ventricular cavity size is decreased. Left ventricular diastolic filling was indeterminate. Patient with significant tachycardia during the study.  Left Atrium: The left atrium was not well visualized. There is no evidence of a patent foramen ovale. A bubble study using agitated saline was performed.  Right Ventricle: The right ventricle is normal in size. There is normal right ventricular global systolic function.  Right Atrium: The right atrium is normal in size.  Aortic Valve: The aortic valve was  not well visualized. There is trace aortic valve regurgitation.  Mitral Valve: The mitral valve is normal in structure. There is trace mitral valve regurgitation.  Tricuspid Valve: The tricuspid valve is structurally normal. There is mild tricuspid regurgitation. The Doppler estimated RVSP is slightly elevated at 34.1 mmHg.  Pulmonic Valve: The pulmonic valve was not assessed. Pulmonic valve regurgitation was not assessed.  Pericardium: There is a moderate pericardial effusion. The pericardial effusion is loculated. Seen anteriorly to the right ventricle.  Aorta: The aortic root is normal.  Systemic Veins: The inferior vena cava size appears small, IVC inspiratory collapse greater than 50%.  In comparison to the previous echocardiogram(s): There are no prior studies on this patient for comparison purposes.       CONCLUSIONS:   1. Poorly visualized anatomical structures due to suboptimal image quality.   2. Left ventricular ejection fraction is hyperdynamic, calculated by Franco's biplane at 78%.   3. Left ventricular diastolic filling was indeterminate.   4. Left ventricular cavity size is decreased.   5. There is normal right ventricular global systolic function.   6. There is a moderate pericardial effusion seen anterior to the right ventricle and containining loculated material   7. Slightly elevated right ventricular systolic pressure.   8. There is no evidence of a patent foramen ovale.    QUANTITATIVE DATA SUMMARY:     2D MEASUREMENTS:          Normal Ranges:  IVSd:            1.00 cm  (0.6-1.1cm)  LVPWd:           0.80 cm  (0.6-1.1cm)  LVIDd:           3.30 cm  (3.9-5.9cm)  LVIDs:           2.70 cm  LV Mass Index:   45 g/m2  LVEDV Index:     34 ml/m2  LV % FS          18.2 %       LV SYSTOLIC FUNCTION BY 2D PLANIMETRY (MOD):                       Normal Ranges:  EF-A4C View:    83 % (>=55%)  EF-A2C View:    73 %  EF-Biplane:     78 %  LV EF Reported: 78 %       RIGHT VENTRICLE:  RV s' 0.20 m/s        TRICUSPID VALVE/RVSP:          Normal Ranges:  Peak TR Velocity:     2.79 m/s  RV Syst Pressure:     34 mmHg  (< 30mmHg)       [id number] Christal Kellogg MD  Electronically signed on 9/13/2024 at 1:14:31 PM       ** Final **  XR chest 1 view  Narrative: Interpreted By:  Cr Mendez,  and Carlos Hardy   STUDY:  XR CHEST 1 VIEW;  9/13/2024 2:47 am      INDICATION:  Signs/Symptoms:b/l pleural effusion.      COMPARISON:  Chest radiograph 09/08/2024      ACCESSION NUMBER(S):  [id number]      ORDERING CLINICIAN:  MAYRA HAYWOOD      FINDINGS:  AP radiograph of the chest was provided.          CARDIOMEDIASTINAL SILHOUETTE:  Cardiomediastinal silhouette is normal in size and configuration.      LUNGS:  Hazy opacity within the left lower lung. Aeration of the left  costophrenic angle is obscured by overlying medical device. Subtle  hazy opacity in the right lower lung. The right costophrenic angle is  clear. No evidence of pneumothorax.      ABDOMEN:  No remarkable upper abdominal findings.      BONES:  No acute osseous changes.      Impression: 1.  Hazy opacity within the left lower lung, which could represent a  small left-sided pleural effusion versus atelectasis. Correlate with  fluid status.  2. Subtle hazy opacity within the right lower lung, which could  represent small pleural effusion versus atelectasis versus  combination.      I personally reviewed the images/study and resident's interpretation  and I agree with the findings as stated by Hayley Gonzalez MD (resident  radiologist). This study was analyzed and interpreted at [facility], [place], [place].      MACRO:  None      Signed by: Cr Mendez 9/13/2024 9:17 AM  Dictation workstation:   [id number]      Physical Exam  Physical Exam  Constitutional:       General: He is not in acute distress.     Appearance: He is not diaphoretic.   HENT:      Head: Normocephalic.      Mouth/Throat:      Mouth: Mucous membranes are moist.    Cardiovascular:      Rate and Rhythm: Normal rate and regular rhythm.      Heart sounds: No murmur heard.  Pulmonary:      Effort: No respiratory distress.      Breath sounds: No rhonchi or rales.   Abdominal:      General: There is no distension.      Palpations: There is no mass.      Tenderness: There is no abdominal tenderness. There is no guarding or rebound.   Musculoskeletal:         General: No swelling.      Right lower leg: No edema.      Left lower leg: No edema.   Skin:     Capillary Refill: Capillary refill takes less than 2 seconds.      Coloration: Skin is pale. Skin is not jaundiced.      Findings: No bruising.   Neurological:      General: No focal deficit present.      Mental Status: He is alert and oriented to person, place, and time.      Comments: Appeared a bit delirious     Relevant Results  Results for orders placed or performed during the hospital encounter of 09/13/24 (from the past 24 hour(s))   CBC and Auto Differential   Result Value Ref Range    WBC 8.1 4.4 - 11.3 x10*3/uL    nRBC 0.7 (H) 0.0 - 0.0 /100 WBCs    RBC 3.30 (L) 4.50 - 5.90 x10*6/uL    Hemoglobin 9.4 (L) 13.5 - 17.5 g/dL    Hematocrit 28.2 (L) 41.0 - 52.0 %    MCV 86 80 - 100 fL    MCH 28.5 26.0 - 34.0 pg    MCHC 33.3 32.0 - 36.0 g/dL    RDW 15.9 (H) 11.5 - 14.5 %    Platelets 112 (L) 150 - 450 x10*3/uL    Immature Granulocytes %, Automated 0.7 0.0 - 0.9 %    Immature Granulocytes Absolute, Automated 0.06 0.00 - 0.70 x10*3/uL   Manual Differential   Result Value Ref Range    Neutrophils %, Manual 90.4 40.0 - 80.0 %    Lymphocytes %, Manual 7.8 13.0 - 44.0 %    Monocytes %, Manual 1.8 2.0 - 10.0 %    Eosinophils %, Manual 0.0 0.0 - 6.0 %    Basophils %, Manual 0.0 0.0 - 2.0 %    Seg Neutrophils Absolute, Manual 7.32 (H) 1.20 - 7.00 x10*3/uL    Lymphocytes Absolute, Manual 0.63 (L) 1.20 - 4.80 x10*3/uL    Monocytes Absolute, Manual 0.15 0.10 - 1.00 x10*3/uL    Eosinophils Absolute, Manual 0.00 0.00 - 0.70 x10*3/uL     Basophils Absolute, Manual 0.00 0.00 - 0.10 x10*3/uL    Total Cells Counted 115     RBC Morphology See Below     Target Cells Few     Hecker Cells Few     Acanthocytes Few    Comprehensive metabolic panel   Result Value Ref Range    Glucose 112 (H) 74 - 99 mg/dL    Sodium 138 136 - 145 mmol/L    Potassium 2.9 (LL) 3.5 - 5.3 mmol/L    Chloride 104 98 - 107 mmol/L    Bicarbonate 23 21 - 32 mmol/L    Anion Gap 14 10 - 20 mmol/L    Urea Nitrogen 7 6 - 23 mg/dL    Creatinine 0.91 0.50 - 1.30 mg/dL    eGFR >90 >60 mL/min/1.73m*2    Calcium 7.1 (L) 8.6 - 10.6 mg/dL    Albumin 1.5 (L) 3.4 - 5.0 g/dL    Alkaline Phosphatase 693 (H) 33 - 136 U/L    Total Protein 4.7 (L) 6.4 - 8.2 g/dL     (H) 9 - 39 U/L    Bilirubin, Total 2.5 (H) 0.0 - 1.2 mg/dL     (H) 10 - 52 U/L   Coagulation Screen   Result Value Ref Range    Protime 17.5 (H) 9.8 - 12.8 seconds    INR 1.5 (H) 0.9 - 1.1    aPTT 42 (H) 27 - 38 seconds   Magnesium   Result Value Ref Range    Magnesium 1.73 1.60 - 2.40 mg/dL   CBC and Auto Differential   Result Value Ref Range    WBC 7.0 4.4 - 11.3 x10*3/uL    nRBC 0.6 (H) 0.0 - 0.0 /100 WBCs    RBC 3.38 (L) 4.50 - 5.90 x10*6/uL    Hemoglobin 9.6 (L) 13.5 - 17.5 g/dL    Hematocrit 29.7 (L) 41.0 - 52.0 %    MCV 88 80 - 100 fL    MCH 28.4 26.0 - 34.0 pg    MCHC 32.3 32.0 - 36.0 g/dL    RDW 16.1 (H) 11.5 - 14.5 %    Platelets 109 (L) 150 - 450 x10*3/uL    Neutrophils % 74.8 40.0 - 80.0 %    Immature Granulocytes %, Automated 0.6 0.0 - 0.9 %    Lymphocytes % 18.1 13.0 - 44.0 %    Monocytes % 6.4 2.0 - 10.0 %    Eosinophils % 0.0 0.0 - 6.0 %    Basophils % 0.1 0.0 - 2.0 %    Neutrophils Absolute 5.22 1.20 - 7.70 x10*3/uL    Immature Granulocytes Absolute, Automated 0.04 0.00 - 0.70 x10*3/uL    Lymphocytes Absolute 1.26 1.20 - 4.80 x10*3/uL    Monocytes Absolute 0.45 0.10 - 1.00 x10*3/uL    Eosinophils Absolute 0.00 0.00 - 0.70 x10*3/uL    Basophils Absolute 0.01 0.00 - 0.10 x10*3/uL        Assessment/Plan       Assessment & Plan  Alcoholic liver failure (Multi)    Manuel Bowser is a 62 yo male patient with PMHx of DM, HTN, HFpEF, alcohol use disorder, pancreatitis, who presented as a transfer from [facility] ICU for workup of acute liver failure. He was started on Lactulose from OSH and had acetylcysteine since his admission to  MICU. Was said to be encephalopathic at OSH but was AOX4 upon arrival to  MICU. Hepatology was consulted and would appreciate their recs. Potassium levels came low and was replenished.     #Acute liver injury/failure, possible acute on chronic liver disease  #HE  #Coagulopathy  -Hx of chronic alcohol use disorder  -Patient presents with acute liver injury with INR >9.5 +/- failure given reported encephalopathy with A&Ox2 at OSH. Currently A&Ox4, no asterixis or c/f current encephalopathy though has been treated with lactulose  -Hepatic function panel severely deranged-Alb-1.7,ALP-804,ALT-278,AST-945  -Hepatitis panel, A1AT, Ceruloplasmin,HSV1&2, Acetaminophen level,AFP,Fibrinogen,MICHAEL are all within normal limits  -Ferritin>9000,serum iron-85 WNL  -EBV DNA<35 detected  -IgG-1600  -AMA-29.3 (uln 24.9, >25 is positive), repeat AMA-9/14 is negative  -liver US shows echogenic appearance of the liver compatible with steatosis and normal Doppler interrogation of the hepatic vasculature. There is mild abdominal ascites and a right pleural effusion   -S/p IV Vit K x1 9/11, 1u FFP 9/12 with INR >9.5, now 2.8,aPTT-125.  -had Acetylcysteine in the MICU  -INR-2.8-->1.5-9/14, ALP-804-->693-9/14, AST-945-->401-9/14, ALT-278-->169-9/14.  -MELD-18, MELD-Na-14-9/14  Plan-  -Hepatology following, appreciate recs  -will follow up on pending hepatic work up  -continue with lactulose, titrating to 3-4 BM/day   -Monitor Hgb, Coags, signs of bleed  -Daily MELD labs     #Acute anaemia/concern for hemolysis  -Hgb 12.0 to 9.5   - pale but not jaundiced  -Tbili-2.7, indirect-1.4, LDH-846, Haptoglobin<30  -peripheral blood  smear-Mild microcytosis with many target cells and many Pappenheimer bodies. Findings consistent with hemoglobinopathy.   -Hb remains stable   Plan-  -monitor Hb     # Traumatic catheterization  -Difficult catheterization at MICU with bleeding.  -patient accidentally urinated without difficulty  Plan-  -bladder scan to rule out retention of urine if suspected  -urology to pass catheter if there are concerns for retention     #HFpEF  #Tachycardia  -limited ECHO -9/13 shows EF of 78%  -CXR without signs of pulmonary edema  -Tachycardia resolved  -on home metop 25mg bid     FEN  Fluids: PRN  Electrolytes: K>4, Mg>2  Nutrition: Adult regular diet  Prophylaxis:  DVT ppx: chem ppx held iso coagulopathy, SCDs  GI ppx: PPI  Bowel care: lactulose  Hardware:                        Social:  Code: Full Code    HPOA: Mayela (wife) [phone]  Disposition: Floor     Plan is not final until reviewed by Attending-DO Ortiz Jurado MD

## 2024-09-13 NOTE — CONSULTS
Wound Care Consult     Visit Date: 9/13/2024      Patient Name: Manuel Bowser         MRN: [id number]           YOB: 1960     Reason for Consult: BLE wounds     Wound History: Present on admission; pt is diabetic      Wound Assessment:  Wound 09/13/24 Toe (Comment  which one) Left;Distal (Active)   Wound Image   09/13/24 0412   Wound Length (cm) 1.5 cm 09/13/24 1638   Wound Width (cm) 1 cm 09/13/24 1638   Wound Surface Area (cm^2) 1.5 cm^2 09/13/24 1638   Margins Attached edges;Well-defined edges 09/13/24 1638   Drainage Description Sanguineous 09/13/24 0412   Drainage Amount None 09/13/24 1638   Dressing Open to air;Other (Comment) 09/13/24 1638       Wound 09/13/24 Heel Left (Active)   Wound Image   09/13/24 1639   State of Healing Eschar 09/13/24 1639   Margins Well-defined edges 09/13/24 1639   Drainage Description Serosanguineous;Tan 09/13/24 1639   Drainage Amount Scant 09/13/24 1639   Dressing Wound gel;Silicone border dressing 09/13/24 1639   Dressing Changed New 09/13/24 1639   Dressing Status Clean;Dry 09/13/24 1639       Wound 09/13/24 Heel Right (Active)   Wound Image   09/13/24 0415     Assessment/Intervention: Pt resting in MICU bed. BLEs thin, dry and fragile with scattered shallow abrasions. DP pulses non-palpable BL suggesting some component of arterial disease. L 2nd toe with missing nail and surrounding dry, pink, shallow wound; no drainage. Dirt and possibly dried wound drainage between toes. Heel pink and fragile but intact; some peeling noted. Foot/toes cleaned. Dry 2nd toe wound painted with betadine and left TUYET. Lotion applied to shin/ankle. R heel with 3 small wounds noted. Largest located at plantar aspect and measures 1cm x 1.5cm x ~0.5cm, but spony, tan eschar plug prevents truly accurate depth measurement. Other 2 wounds dry, pink and shallow. Wounds cleaned, and deep wound dressed with Medihoney. Heel Mepilex to cover all. Green EHOB TruView heel lift boots reapplied.  Recs below -->     Wound Team Recs: Float heels. Apply lotion daily to dry shins/ankles. May leave dry, pink L 2nd toe wound TUYET. Cleanse R heel daily with Vashe wound cleanser, then dress deep plantar heel wound with Medihoney gel (oracle #[id number]). Apply Mepilex heel dressing to cover all 3 small heel wounds.     Provider, please review recs above and sign saved wound care orders accordingly.      Chen Ratliff RN, CWON  9/13/2024  4:40 PM

## 2024-09-13 NOTE — H&P
Medical Intensive Care - History and Physical   Subjective    Manuel Bowser is a 63 y.o. year old male patient admitted on 9/13/2024 with following ICU needs: ICU to ICU transfer    HPI:  63 year old male with PMHx of HFpEF, DM, chronic pancreatitis, HTN, DLD, and alcohol use disorder presenting as a transfer from [facility] ICU for workup of acute liver injury vs failure and potential transplant evaluation.     Patient states that he initially presented to [facility]  for shortness of breath, chest pain, and abdominal pain. There, he was diagnosed with pneumonia and was told he has some issues with his liver function and will be transferred to [facility] for further testing. He currently states that his generalized chest pain, non radiating and non exertional, is still present and rated at an 8/10. His abdominal pain is mostly RUQ and epigastric and has also remained at an 8/10, both of which stable from his presentation. His SOB has slightly improved. Denies any fever, chills, headaches, confusion, nausea, vomiting, diarrhea, bloody or black stools. Does note his urine has changed to a darker color, unsure of when it began. Denies any previous diagnosis of liver disease, though does note that both his brother and father passed away from alcohol related cirrhosis. He endorses a history of alcohol use, drinking 2 fifths of liquor a day for many years, but states last drink was in the 70s. No recent tobacco or recreational drug use.    Per intake form and transfer chart containing MAR and labs, appears to initially presented to [facility] after a fall. Found to have elevated alk phos with an INR of 6.5 iso alcohol use disorder with last drink one month ago. Patient became encephalopathic and with worsening liver enzymes, was admitted to [facility] ICU and transfer discussed with Dr. Moreau.     Per MAR: He was initially receiving atorvastatin 9/10, SQH with last dose being the morning of 9/11, started on lactulose 9/11, continued on  levothyroxine 125 mcg daily, melatonin 6 mg daily, pantoprazole 40 mg daily, thiamine 100 mg daily p.o., 4 g calcium gluconate 9/11 and 9/12, Dilaudid 0.35 mg IV, oxycodone 5 mg p.o., vitamin K IV 10 mg x 1 9/12, vitamin K 5 mg p.o. 9/11, Tylenol 650mg x2 on 9/10, aspirin 81 mg 9/10 - 9/11, metoprolol tartrate 25 mg BID 9/10.    Received 1u FFP 9/12 prior to transfer    Labs:  9/10  Initially presented with troponin 0.061--> 0.040  Alkaline phosphatase 904, ALT 56, , total bilirubin 1.0, direct bilirubin 0, albumin 1.6  Total iron 52, iron binding capacity 87, iron saturation 60, WBC 6.3, hemoglobin 8.8, hematocrit 26.1, MCV 83.7, platelets 157  Sodium 134, potassium 3.1, chloride 107, bicarb 27, BUN 9, creatinine 0.88, calcium 6.7  Hepatitis panel negative, antimitochondrial antibody 29.3  INR 2.8, 5 nucleotidase 133 (0-15), GGT 1778  Vitamin B12 955    9/11  Alkaline phosphatase 1,111, , , total bilirubin 1.9, direct bilirubin 0.3, albumin 1.7  WBC 7.7, hemoglobin 10.0, hematocrit 28.3, MCV 82.5, platelets 155  Sodium 133, potassium 3.7, chloride 107, bicarb 25 BUN 8, creatinine 0.74, calcium 6.7, magnesium 1.6  HIV 1, 2 negative  Ammonia 67  INR 6.5    9/12  WBC 8.8, hemoglobin 12.0 hematocrit 35.9, MCV 86.3, platelets 117  Sodium 135, potassium 4.4, chloride 110, bicarb 18, BUN 7, creatinine 0.67, calcium 7.7, magnesium 1.9  INR greater than 9.5  Sodium 136, potassium 4.3, chloride 110, bicarb 21, BUN 6, creatinine 0.6,  Alkaline phosphatase 943, , AST 1866, total bilirubin 2.6, direct bilirubin 0.7, albumin 1.9  INR greater than 9.5  Alkaline phosphatase 1085, , AST 1851, total bilirubin 2.2, direct bilirubin 0.6, albumin 1.7      Review of Systems:  Review of Systems   All other systems reviewed and are negative.         Meds    Home medications:  No current outpatient medications     Inpatient medications:  Scheduled medications  pantoprazole, 40 mg, oral, Daily before  "breakfast   Or  esomeprazole, 40 mg, nasoduodenal tube, Daily before breakfast   Or  pantoprazole, 40 mg, intravenous, Daily before breakfast      Continuous medications     PRN medications       Objective    Blood pressure (!) 128/99, pulse (!) 117, temperature 36.2 °C (97.2 °F), temperature source Temporal, resp. rate 15, height 1.753 m (5' 9\"), weight 73.1 kg (161 lb 2.5 oz), SpO2 100%.     Physical Exam  Constitutional:       Comments: Comfortable, in NAD   Eyes:      General: No scleral icterus.  Cardiovascular:      Rate and Rhythm: Tachycardia present.      Heart sounds: No murmur heard.     No friction rub. No gallop.   Pulmonary:      Effort: Pulmonary effort is normal. No respiratory distress.      Breath sounds: Normal breath sounds. No stridor. No wheezing or rhonchi.      Comments: Diminished breath sounds lung bases, L>R  Abdominal:      Comments: Soft, generalized tenderness worst RUQ, slight shifting dullness appreciated   Skin:     Comments: Trace pitting edema b/l UE, 1+ b/l LE to mid calf  No jaundice     Neurological:      Mental Status: He is oriented to person, place, and time.      Comments: No asterixis        No intake or output data in the 24 hours ending 09/13/24 0227  Labs:                        Micro/ID:     No results found for: \"URINECULTURE\", \"BLOODCULT\", \"CSFCULTSMEAR\"    Summary of Key Imaging Results  pending    Assessment and Plan     Assessment:  Manuel Bowser is a 63 y.o. year old male patient admitted to the MICU on 09/13/24 as a transfer from [facility] ICU for liver transplant workup iso acute liver injury vs failure.     Mechanical Ventilation: none  Sedation/Analgesia:  none  Restraints: no    Plan:  NEUROLOGY/PSYCH:  Dx:  ?HE  Reported HE at OSH per intake form and A&Ox2, no notes available in paper chart transferred with patient. Now on lactulose, A&O x4, unclear picture regarding HE  Management:  Continue with lactulose, titrating to 3-4 " BM/day    CARDIOVASCULAR:  Dx:  HFpEF  Tachycardia  HFpEF per chart though no further details available  Had TTE performed at OSH with indication of chest pain  Rates 120, appears to be sinus on tele  Management:  ECG  Will order over read of OSH TTE  Consider restarting metoprolol tartrate 25mg BID pending hemodynamic stability     PULMONARY:  Dx:  Pleural effusion  Noted on liver US  ?Pneumonia  Patient reports being diagnosed with pneumonia, no antibiotics or procal based on MAR/labs  No obvious infiltrates or consolidations on 9/8 CXR  Management:  CXR to evaluate pleural effusion, does not appear to be significant effusion on 9/8 CXR  Monitor O2 requirement, currently on room air    RENAL/GENITOURINARY:  Dx:  No active issues  Upon replacing butts, was noted to have potential hematuria and replacement butts unable to be passed  Management:  Monitor Cr, renal function  UA ordered  Consider urology consult in AM for butts placement with c/f potential hematuria and clots    GASTROENTEROLOGY:  Dx:  Acute liver injury vs failure  Patient presents with acute liver injury with INR >9.5 +/- failure given reported encephalopathy with A&Ox2 at OSH. Currently A&Ox4, no asterixis or c/f current encephalopathy though has been treated with lactulose  Has had marked increase in AST compared to other liver function enzymes, rising from 117 to 1,886 9/10-9/12 while ALT 56 to 369, Alk phos 904 to 1085, and Tbili 1.0 to 2.2 with direct 0 to 0.6. While AST>ALT ratio seen in alcohol related liver disease, some question as to largely indirect hyperbilirubinemia and acute elevation in AST after admission. Additionally, LFTs to this degree of elevation unexpected in acute liver injury 2/2 alcohol use, would typically expect viral etiology, ischemia,   Hepatitis panel negative, AMA 29.3. Will repeat workup with hep A IgM, HBsAg, Hbc IgM, HCV Ab, HCV pcr, MICHAEL, AMA, ASMA, Anti LKM, AAT1, ceruloplasmin, ferritin, iron panel, IgG, and HSV.  Will also order Peth, lipase levels  Discussion of MRCP, paracentesis at OSH though does not appear either were completed prior to transfer  Liver US with doppler performed  S/p IV Vit K x1 9/11, 1u FFP 9/12 with INR >9.5  With AMA positive, some c/f biliary pathology leading to obstructiion resulting in elevation in LFTs, though would not expect asymmetric elevation in AST and primarily indirect hyperbilirubinemia  Chronic pancreatitis  Also cw hx of chronic alcohol use disorder  Management:  Ordered over read of liver US with doppler  Fu liver stamp, Peth  Consider MRCP, paracentesis. If significant ascites found on US read/POCUS, low threshold to start empiric SBP tx with CTX 2g, albumin  Fu UA with reflex for infectious w/u  Cw lactulose, titrating to 3-4 BM per day  Hepatology consult in the morning  Consider steroids if felt to be acute alcohol related hepatitis and MDF>32    ENDOCRINOLOGY:  Dx:  No active issues  Management:  None    HEMATOLOGY:  Dx:  C/f hemolysis  Given marked increase in AST and primarily indirect hyperbilirubinemia along with drop in Hgb 12.0 to 9.5, some c/f hemolysis with some makeda blood seen while attempting to exchange butts  Coagulopathy  INR >9.5, s/p 1u FFP, 1u Vit K  Likely 2/2 underlying liver disease  Management:  T&S, fibrinogen, coags ordered  Monitor Hgb, INR, signs of bleed  TEG, peripheral smear ordered    SKIN:  Dx:  Skin Failure No  Management:  None    MUSCULOSKELETAL:  Dx:  No active issues  Management:  None    INFECTIOUS DISEASE:  Dx:  No active issues  UA with reflex micro, culture ordered as part of acute on chronic liver failure w/u  ?Pneumonia, no current c/f pneumonia  Given abdominal pain with ascites seen on OSH liver US, cannot rule out SBP at this time but less of a concern currently  Management:  Can consider paracentesis     ICU Check List     FEN  Fluids: PRN  Electrolytes: PRN  Nutrition: 2g restricted  Prophylaxis:  DVT ppx: chem ppx held iso  coagulopathy, SCDs  GI ppx: PPI  Bowel care: lactulose  Hardware:                   Social:  Code: Full Code    HPOA: Mayela (wife) [phone]   Disposition: OZZY Lou MD   09/13/24 at 2:27 AM     Disclaimer: Documentation completed with the information available at the time of input. The times in the chart may not be reflective of actual patient care times, interventions, or procedures. Documentation occurs after the physical care of the patient.

## 2024-09-13 NOTE — CARE PLAN
The patient's goals for the shift include      The clinical goals for the shift include Patient will remain hemodynamically stable    Over the shift, the patient did not make progress toward the following goals. Barriers to progression include none. Recommendations to address these barriers include   Problem: Fall/Injury  Goal: Not fall by end of shift  Outcome: Progressing  Goal: Be free from injury by end of the shift  Outcome: Progressing  Goal: Verbalize understanding of personal risk factors for fall in the hospital  Outcome: Progressing  Goal: Verbalize understanding of risk factor reduction measures to prevent injury from fall in the home  Outcome: Progressing  Goal: Use assistive devices by end of the shift  Outcome: Progressing  Goal: Pace activities to prevent fatigue by end of the shift  Outcome: Progressing     Problem: Skin  Goal: Decreased wound size/increased tissue granulation at next dressing change  Outcome: Progressing  Flowsheets (Taken 9/13/2024 0145)  Decreased wound size/increased tissue granulation at next dressing change:   Protective dressings over bony prominences   Promote sleep for wound healing  Goal: Participates in plan/prevention/treatment measures  Outcome: Progressing  Flowsheets (Taken 9/13/2024 0145)  Participates in plan/prevention/treatment measures: Elevate heels  Goal: Prevent/manage excess moisture  Outcome: Progressing  Flowsheets (Taken 9/13/2024 0145)  Prevent/manage excess moisture:   Monitor for/manage infection if present   Cleanse incontinence/protect with barrier cream  Goal: Prevent/minimize sheer/friction injuries  Outcome: Progressing  Flowsheets (Taken 9/13/2024 0145)  Prevent/minimize sheer/friction injuries: Turn/reposition every 2 hours/use positioning/transfer devices  Goal: Promote/optimize nutrition  Outcome: Progressing  Flowsheets (Taken 9/13/2024 0145)  Promote/optimize nutrition:   Monitor/record intake including meals   Discuss with provider if NPO > 2  days  Goal: Promote skin healing  Outcome: Progressing  Flowsheets (Taken 9/13/2024 0145)  Promote skin healing:   Turn/reposition every 2 hours/use positioning/transfer devices   Protective dressings over bony prominences   Assess skin/pad under line(s)/device(s)   .

## 2024-09-13 NOTE — PROGRESS NOTES
"  SOCIAL WORK NOTE      09/13/24 5128   Discharge Planning   Living Arrangements Spouse/significant other   Support Systems Spouse/significant other;Family members   Assistance Needed wife assists   Type of Residence Private residence   Home or Post Acute Services None   Financial Resource Strain   How hard is it for you to pay for the very basics like food, housing, medical care, and heating? Not hard   Housing Stability   In the last 12 months, was there a time when you were not able to pay the mortgage or rent on time? N   In the past 12 months, how many times have you moved where you were living? 0   At any time in the past 12 months, were you homeless or living in a shelter (including now)? N   Transportation Needs   In the past 12 months, has lack of transportation kept you from medical appointments or from getting medications? no   In the past 12 months, has lack of transportation kept you from meetings, work, or from getting things needed for daily living? No       NOK: wife and sister   DME:denied    Home Care:  denied  HD: denied   PCP: Casi Nava  Additional information:  SW met with patient at bedside for assessment (please see flowsheets for further details). He was generally able to answer questions appropriately, but at times did not make sense. He states that he and his wife \"take care of  each other\", but could not elaborate. SW will reach out to wife as needed for further information if needed. Currently denied needs for social work. Social work to follow.   Charissa Gatica, JAYLEEN, LISW-S ([id number])          "

## 2024-09-14 ENCOUNTER — APPOINTMENT (OUTPATIENT)
Dept: CARDIOLOGY | Facility: HOSPITAL | Age: 64
End: 2024-09-14
Payer: COMMERCIAL

## 2024-09-14 PROBLEM — S91.309A MULTIPLE OPEN WOUNDS OF FOOT: Status: ACTIVE | Noted: 2024-09-14

## 2024-09-14 PROBLEM — K70.9: Status: ACTIVE | Noted: 2024-09-14

## 2024-09-14 PROBLEM — I50.32 CHRONIC HEART FAILURE WITH PRESERVED EJECTION FRACTION (MULTI): Status: ACTIVE | Noted: 2024-09-14

## 2024-09-14 PROBLEM — D68.4: Status: ACTIVE | Noted: 2024-09-14

## 2024-09-14 PROBLEM — K76.82 HEPATIC ENCEPHALOPATHY: Status: ACTIVE | Noted: 2024-09-14

## 2024-09-14 LAB
ALBUMIN SERPL BCP-MCNC: 1.5 G/DL (ref 3.4–5)
ALBUMIN SERPL BCP-MCNC: 1.7 G/DL (ref 3.4–5)
ALP SERPL-CCNC: 693 U/L (ref 33–136)
ALT SERPL W P-5'-P-CCNC: 169 U/L (ref 10–52)
ANA SER QL HEP2 SUBST: NEGATIVE
ANION GAP SERPL CALC-SCNC: 14 MMOL/L (ref 10–20)
ANION GAP SERPL CALC-SCNC: 14 MMOL/L (ref 10–20)
APTT PPP: 42 SECONDS (ref 27–38)
AST SERPL W P-5'-P-CCNC: 401 U/L (ref 9–39)
BASOPHILS # BLD AUTO: 0.01 X10*3/UL (ref 0–0.1)
BASOPHILS NFR BLD AUTO: 0.1 %
BILIRUB SERPL-MCNC: 2.5 MG/DL (ref 0–1.2)
BUN SERPL-MCNC: 7 MG/DL (ref 6–23)
BUN SERPL-MCNC: 7 MG/DL (ref 6–23)
CALCIUM SERPL-MCNC: 7.1 MG/DL (ref 8.6–10.6)
CALCIUM SERPL-MCNC: 7.3 MG/DL (ref 8.6–10.6)
CHLORIDE SERPL-SCNC: 103 MMOL/L (ref 98–107)
CHLORIDE SERPL-SCNC: 104 MMOL/L (ref 98–107)
CO2 SERPL-SCNC: 23 MMOL/L (ref 21–32)
CO2 SERPL-SCNC: 25 MMOL/L (ref 21–32)
CREAT SERPL-MCNC: 0.91 MG/DL (ref 0.5–1.3)
CREAT SERPL-MCNC: 0.99 MG/DL (ref 0.5–1.3)
EGFRCR SERPLBLD CKD-EPI 2021: 86 ML/MIN/1.73M*2
EGFRCR SERPLBLD CKD-EPI 2021: >90 ML/MIN/1.73M*2
EOSINOPHIL # BLD AUTO: 0 X10*3/UL (ref 0–0.7)
EOSINOPHIL NFR BLD AUTO: 0 %
ERYTHROCYTE [DISTWIDTH] IN BLOOD BY AUTOMATED COUNT: 16.1 % (ref 11.5–14.5)
GLUCOSE SERPL-MCNC: 112 MG/DL (ref 74–99)
GLUCOSE SERPL-MCNC: 136 MG/DL (ref 74–99)
HCT VFR BLD AUTO: 29.7 % (ref 41–52)
HGB BLD-MCNC: 9.6 G/DL (ref 13.5–17.5)
IMM GRANULOCYTES # BLD AUTO: 0.04 X10*3/UL (ref 0–0.7)
IMM GRANULOCYTES NFR BLD AUTO: 0.6 % (ref 0–0.9)
INR PPP: 1.5 (ref 0.9–1.1)
LYMPHOCYTES # BLD AUTO: 1.26 X10*3/UL (ref 1.2–4.8)
LYMPHOCYTES NFR BLD AUTO: 18.1 %
MAGNESIUM SERPL-MCNC: 1.73 MG/DL (ref 1.6–2.4)
MAGNESIUM SERPL-MCNC: 1.97 MG/DL (ref 1.6–2.4)
MCH RBC QN AUTO: 28.4 PG (ref 26–34)
MCHC RBC AUTO-ENTMCNC: 32.3 G/DL (ref 32–36)
MCV RBC AUTO: 88 FL (ref 80–100)
MITOCHONDRIA AB SER QL IF: NEGATIVE
MONOCYTES # BLD AUTO: 0.45 X10*3/UL (ref 0.1–1)
MONOCYTES NFR BLD AUTO: 6.4 %
NEUTROPHILS # BLD AUTO: 5.22 X10*3/UL (ref 1.2–7.7)
NEUTROPHILS NFR BLD AUTO: 74.8 %
NRBC BLD-RTO: 0.6 /100 WBCS (ref 0–0)
PHOSPHATE SERPL-MCNC: 1.8 MG/DL (ref 2.5–4.9)
PLATELET # BLD AUTO: 109 X10*3/UL (ref 150–450)
POTASSIUM SERPL-SCNC: 2.9 MMOL/L (ref 3.5–5.3)
POTASSIUM SERPL-SCNC: 3 MMOL/L (ref 3.5–5.3)
PROT SERPL-MCNC: 4.7 G/DL (ref 6.4–8.2)
PROTHROMBIN TIME: 17.5 SECONDS (ref 9.8–12.8)
RBC # BLD AUTO: 3.38 X10*6/UL (ref 4.5–5.9)
SODIUM SERPL-SCNC: 138 MMOL/L (ref 136–145)
SODIUM SERPL-SCNC: 139 MMOL/L (ref 136–145)
WBC # BLD AUTO: 7 X10*3/UL (ref 4.4–11.3)

## 2024-09-14 PROCEDURE — 2500000001 HC RX 250 WO HCPCS SELF ADMINISTERED DRUGS (ALT 637 FOR MEDICARE OP)

## 2024-09-14 PROCEDURE — 83735 ASSAY OF MAGNESIUM: CPT

## 2024-09-14 PROCEDURE — 1100000001 HC PRIVATE ROOM DAILY

## 2024-09-14 PROCEDURE — 85610 PROTHROMBIN TIME: CPT

## 2024-09-14 PROCEDURE — 36415 COLL VENOUS BLD VENIPUNCTURE: CPT

## 2024-09-14 PROCEDURE — 99232 SBSQ HOSP IP/OBS MODERATE 35: CPT

## 2024-09-14 PROCEDURE — 93010 ELECTROCARDIOGRAM REPORT: CPT | Performed by: INTERNAL MEDICINE

## 2024-09-14 PROCEDURE — 93005 ELECTROCARDIOGRAM TRACING: CPT

## 2024-09-14 PROCEDURE — 2500000004 HC RX 250 GENERAL PHARMACY W/ HCPCS (ALT 636 FOR OP/ED)

## 2024-09-14 PROCEDURE — 80069 RENAL FUNCTION PANEL: CPT | Mod: CCI

## 2024-09-14 PROCEDURE — 85025 COMPLETE CBC W/AUTO DIFF WBC: CPT

## 2024-09-14 PROCEDURE — 2500000005 HC RX 250 GENERAL PHARMACY W/O HCPCS

## 2024-09-14 PROCEDURE — 85045 AUTOMATED RETICULOCYTE COUNT: CPT

## 2024-09-14 PROCEDURE — 80053 COMPREHEN METABOLIC PANEL: CPT

## 2024-09-14 RX ORDER — MAGNESIUM SULFATE HEPTAHYDRATE 40 MG/ML
2 INJECTION, SOLUTION INTRAVENOUS ONCE
Status: COMPLETED | OUTPATIENT
Start: 2024-09-14 | End: 2024-09-14

## 2024-09-14 RX ORDER — POTASSIUM CHLORIDE 14.9 MG/ML
20 INJECTION INTRAVENOUS
Status: COMPLETED | OUTPATIENT
Start: 2024-09-14 | End: 2024-09-14

## 2024-09-14 ASSESSMENT — PAIN SCALES - GENERAL
PAINLEVEL_OUTOF10: 0 - NO PAIN

## 2024-09-14 ASSESSMENT — COGNITIVE AND FUNCTIONAL STATUS - GENERAL
MOVING TO AND FROM BED TO CHAIR: A LITTLE
DAILY ACTIVITIY SCORE: 21
MOVING FROM LYING ON BACK TO SITTING ON SIDE OF FLAT BED WITH BEDRAILS: A LITTLE
HELP NEEDED FOR BATHING: A LITTLE
STANDING UP FROM CHAIR USING ARMS: A LITTLE
MOBILITY SCORE: 18
CLIMB 3 TO 5 STEPS WITH RAILING: A LITTLE
TOILETING: A LITTLE
DRESSING REGULAR UPPER BODY CLOTHING: A LITTLE
WALKING IN HOSPITAL ROOM: A LITTLE
TURNING FROM BACK TO SIDE WHILE IN FLAT BAD: A LITTLE

## 2024-09-14 NOTE — HOSPITAL COURSE
Manuel Bowser is a 64 yo male patient with PMHx of DM, HTN, HFpEF, alcohol use disorder, pancreatitis, who presented as a transfer from [facility] ICU for workup of acute liver failure and evaluation for liver transplant. He initially presented for SOB, chest pain and abdominal pain, diagnosed there with pneumonia. He was found to have increased LFTs, became encephalopathic and admitted to [facility] ICU. Received lactulose there, FFP and vit K for coagulopathy. Transferred to [facility] early on 9/13 for continuity of care. On admission to the MICU, he was alert and oriented x 4. He still reports chest pain, SOB, and abdominal pain, however, he note pain has improved. LFTs upon arrival were , , alk phos 800, jocelynn total 2.7 direct 1.3, INR 2.8, alb 1.7, normal creat. US indicated mild ascites.    After a brief stay in the ICU, he was transferred to the floor on the afternoon of 9/13. Hepatology was consulted and recommended a comprehensive work-up of his acute liver decompensation (see their notes for more details). Ultimately pt underwent IR transjugular liver biopsy x2 with hepatic pressure cath and concurrent paracentesis with 600 ml fluid removed on 9/16/24. HVPG of 7 confirmed portal HTN but was less than 10 so considered not significant enough to cause ascites. Cytology of the ascitic fluid was negative. Serologic workup for acute liver injury was largely unremarkable with exception of positive ASMA.   Pt is scheduled for outpatient follow up with Hepatology on 10/9/24 to review biopsy results and workup. Repeat CMP to be done before that visit.    On the floor, pt initially had improvement in mental status with lactulose titrated to 3-4 bowel movements daily, and had gradual improvement of liver enzymes, bili, and INR. However, on 9/23, nursing reported concerns of dysphagia so a SLP evaluation was obtained, which ultimately deemed pt. unsafe for PO dietary intake. This lead to pt. missing several doses of  scheduled lactulose and the eventual redevelopment of hepatic encephalopathy. It took several days of aggressive lactulose scheduling (e.g., Q4H) to resolve his hepatic encephalopathy. However, despite this, the pt lacked capacity in decision making in the week prior to discharge.    Repeat SLP evaluation including the utilization of modified barium swallow study deemed the pt to be high risk for aspiration and recommended NPO status, including medications, and a short course of alternative nutrition/hydration/medication support. Furthermore, PT and OT recommendations were for pt to discharge to SNF. Because of the pts lack of capacity, his next of kin (his wife, as he had no HDPOA) was contacted. Despite extensive counseling by the interdisciplinary medical team, the pts wife elected to have him discharged home and be allowed to eat as he wishes. Prior to discharge, family (not including the pts wife or children) had asked to speak to the medical team regarding the pts care and discharge plans. When informed, they became upset as they did not believe this was in-line with the pts best interests. Even more, they informed the medical team that they pts wife herself was requiring extensive care needs at home provided by home health care services. Of note, the pt, prior to his hospitalization, was providing a significant amount of care for his wife.     Because of these findings, [facility] Ethics was consulted regarding concern for safe discharge. As the patient had no formal HCPOA form filled and the patient's wife was his next-of-kin, they recommended following the wife's decisions for discharge. Despite extensive counseling regarding safety at home in the setting of PT/OT recommendations, the wife consistently expressed her desire for the pt to be discharged home. Mr. Bowser was re-evaluated for capacity on a daily basis, however he continued to have altered mental status, so he was ultimately discharged home on 10/1.  While the discharge to home was not the ideal option given the recommendations provided by SLP/PT/OT, it was optimized by incorporating Healthy at Home as well as palliative care. Lastly, because of the concerns expressed by family during the meeting, Ethics recommended filing a report with Adult Protective Services following discharge, which was completed.

## 2024-09-14 NOTE — CARE PLAN
The patient's goals for the shift include      The clinical goals for the shift include   Problem: Fall/Injury  Goal: Not fall by end of shift  Outcome: Progressing  Goal: Be free from injury by end of the shift  Outcome: Progressing  Goal: Verbalize understanding of personal risk factors for fall in the hospital  Outcome: Progressing  Goal: Verbalize understanding of risk factor reduction measures to prevent injury from fall in the home  Outcome: Progressing  Goal: Use assistive devices by end of the shift  Outcome: Progressing  Goal: Pace activities to prevent fatigue by end of the shift  Outcome: Progressing     Problem: Skin  Goal: Decreased wound size/increased tissue granulation at next dressing change  Outcome: Progressing  Flowsheets (Taken 9/14/2024 0803)  Decreased wound size/increased tissue granulation at next dressing change:   Promote sleep for wound healing   Protective dressings over bony prominences  Goal: Participates in plan/prevention/treatment measures  Outcome: Progressing  Flowsheets (Taken 9/14/2024 0803)  Participates in plan/prevention/treatment measures:   Discuss with provider PT/OT consult   Elevate heels  Goal: Prevent/manage excess moisture  Outcome: Progressing  Flowsheets (Taken 9/14/2024 0803)  Prevent/manage excess moisture:   Cleanse incontinence/protect with barrier cream   Moisturize dry skin   Monitor for/manage infection if present  Goal: Prevent/minimize sheer/friction injuries  Outcome: Progressing  Flowsheets (Taken 9/14/2024 0803)  Prevent/minimize sheer/friction injuries:   HOB 30 degrees or less   Use pull sheet   Turn/reposition every 2 hours/use positioning/transfer devices  Goal: Promote/optimize nutrition  Outcome: Progressing  Flowsheets (Taken 9/14/2024 0803)  Promote/optimize nutrition:   Monitor/record intake including meals   Assist with feeding   Offer water/supplements/favorite foods  Goal: Promote skin healing  Outcome: Progressing  Flowsheets (Taken  9/14/2024 0803)  Promote skin healing:   Assess skin/pad under line(s)/device(s)   Turn/reposition every 2 hours/use positioning/transfer devices   Protective dressings over bony prominences     Problem: Pain  Goal: Takes deep breaths with improved pain control throughout the shift  Outcome: Progressing  Goal: Turns in bed with improved pain control throughout the shift  Outcome: Progressing  Goal: Walks with improved pain control throughout the shift  Outcome: Progressing  Goal: Performs ADL's with improved pain control throughout shift  Outcome: Progressing  Goal: Participates in PT with improved pain control throughout the shift  Outcome: Progressing  Goal: Free from opioid side effects throughout the shift  Outcome: Progressing  Goal: Free from acute confusion related to pain meds throughout the shift  Outcome: Progressing

## 2024-09-14 NOTE — PROGRESS NOTES
Manuel Bowser is a 63 y.o. male on day 1 of admission presenting with Alcoholic liver failure (Multi).      Subjective   NAEON.        Objective     Last Recorded Vitals  /77   Pulse 79   Temp 36.2 °C (97.2 °F)   Resp 16   Wt 67.4 kg (148 lb 9.4 oz)   SpO2 99%   Intake/Output last 3 Shifts:    Intake/Output Summary (Last 24 hours) at 9/14/2024 1347  Last data filed at 9/14/2024 1000  Gross per 24 hour   Intake 1514 ml   Output --   Net 1514 ml       Admission Weight  Weight: 67.5 kg (148 lb 13 oz) (09/13/24 0100)    Daily Weight  09/14/24 : 67.4 kg (148 lb 9.4 oz)    Image Results  US liver with doppler  Narrative: Interpreted By:  Manuel Rowley  and Javier Li   STUDY:  US LIVER WITH DOPPLER;  9/13/2024 3:58 pm      INDICATION:  Signs/Symptoms:Liver failure vs liver injury.          COMPARISON:  None.      ACCESSION NUMBER(S):  [id number]      ORDERING CLINICIAN:  JONO ESTRADA      TECHNIQUE:  Multiple images of the right upper quadrant were obtained.  Gray  scale, color Doppler and spectral Doppler waveform analysis was  performed.  This examination was interpreted at [facility].      FINDINGS:  LIVER:  The liver measures 14.7 cm and is diffusely echogenic in appearance,  consistent with diffuse fatty infiltration. The resulting increased  beam attenuation thereby limiting evaluation of the liver for focal  lesions. Within the limitations, no focal lesions are seen.      GALLBLADDER:  The gallbladder is nondistended. Mild smooth thickening of the  gallbladder wall measuring 0.4 cm.      Echogenic sludge within the gallbladder lumen. No discrete  cholelithiasis.      Sonographic Parks's sign is negative.      BILIARY SYSTEM:  No evidence of intra or extrahepatic biliary dilatation is  identified; the common bile duct measures 0.3 cm.      DOPPLER EVALUATION:      HEPATIC ARTERIES:  Hepatic artery and its right and left branches RI's are  estimated at  0.8, .82 and 0.6, respectively.      PORTAL VEIN:  Portal vein is patent and measures 1.2 cm. There is normal  respiratory variation. Portal vein velocities are calculated as  follows: main portal vein 11.6 cm/s, antegrade flow; left portal vein  branch 10.4 cm/s, antegrade flow; right portal vein anterior branch  19.9, antegrade flow; right portal vein posterior branch 14.8,  antegrade flow. The splenic vein is also patent.      HEPATIC VEIN:  The right, middle and left hepatic veins are patent and demonstrate  triphasic antegrade flow. IVC appears also patent.      PANCREAS:  The pancreas is poorly visualized due to overlying bowel gas.      RIGHT KIDNEY:  The right kidney measures 9.8 cm in length. No hydronephrosis or  renal calculi are seen.      There is a cyst of the right interpolar region measuring 0.8 x 0.8 x  0.7 cm.      SPLEEN:  The spleen measures 6.6 cm and is grossly unremarkable.      PERITONEUM AND RETROPERITONEUM:  There is a small volume of abdominal ascites. Note is made of a right  pleural effusion.      Impression: 1. Biliary sludge with mild diffuse thickening of the gallbladder  wall, likely related to ascites. Otherwise the gallbladder is  nondilated without cholelithiasis or sonographic evidence of acute  cholecystitis.  2. Echogenic appearance of the liver compatible with steatosis.  3. Normal Doppler interrogation of the hepatic vasculature.  4. Mild abdominal ascites and a right pleural effusion.      I personally reviewed the image(s)/study and resident interpretation  as stated by Dr. Jen Hererra MD. I agree with the findings as  stated. This study was interpreted at [facility], [place], [place].      MACRO:  None      Signed by: Manuel Rowley 9/13/2024 4:45 PM  Dictation workstation:   [id number]  Transthoracic Echo (TTE) Limited     [facility], [address] [place] [address]                 Tel  [phone] and Fax [phone]    TRANSTHORACIC ECHOCARDIOGRAM REPORT       Patient Name:      EDVIN INGRAM     Reading Physician:    [id number] Christal Kellogg MD  Study Date:        9/13/2024            Ordering Provider:    [id number] JONO ESTRADA  MRN/PID:           [id number]             Fellow:  Accession#:        [id number]         Nurse:  Date of Birth/Age: 1960 / 63 years Sonographer:          Joseph White RDCS  Gender:            M                    Additional Staff:  Height:            175.26 cm            Admit Date:  Weight:            67.13 kg             Admission Status:     Inpatient -                                                                Routine  BSA / BMI:         1.82 m2 / 21.86      Encounter#:           [id number]                     kg/m2  Blood Pressure:    109/65 mmHg          Department Location:  [address]    Study Type:    TRANSTHORACIC ECHO (TTE) LIMITED  Diagnosis/ICD: Encounter for screening for cardiovascular disorders-Z13.6  Indication:    acute decompensated liver disease, pleural effusions  CPT Code:      Echo Limited-08762; Doppler Limited-47510; Color Doppler-46714    Patient History:  Pertinent History: HFpEF, DM, HTN, DLD, alcohol use disorder.    Study Detail: The following Echo studies were performed: M-Mode, 2D, Doppler and                color flow. Technically challenging study due to body habitus,                poor acoustic windows, prominent lung artifact and patient lying                in supine position. Definity used as a contrast agent for                endocardial border definition. Total contrast used for this                procedure was 2.0 mL via IV push.       PHYSICIAN INTERPRETATION:  Left Ventricle: Left ventricular ejection fraction is hyperdynamic, calculated by Franco's biplane at 78%. The left ventricular  cavity size is decreased. Left ventricular diastolic filling was indeterminate. Patient with significant tachycardia during the study.  Left Atrium: The left atrium was not well visualized. There is no evidence of a patent foramen ovale. A bubble study using agitated saline was performed.  Right Ventricle: The right ventricle is normal in size. There is normal right ventricular global systolic function.  Right Atrium: The right atrium is normal in size.  Aortic Valve: The aortic valve was not well visualized. There is trace aortic valve regurgitation.  Mitral Valve: The mitral valve is normal in structure. There is trace mitral valve regurgitation.  Tricuspid Valve: The tricuspid valve is structurally normal. There is mild tricuspid regurgitation. The Doppler estimated RVSP is slightly elevated at 34.1 mmHg.  Pulmonic Valve: The pulmonic valve was not assessed. Pulmonic valve regurgitation was not assessed.  Pericardium: There is a moderate pericardial effusion. The pericardial effusion is loculated. Seen anteriorly to the right ventricle.  Aorta: The aortic root is normal.  Systemic Veins: The inferior vena cava size appears small, IVC inspiratory collapse greater than 50%.  In comparison to the previous echocardiogram(s): There are no prior studies on this patient for comparison purposes.       CONCLUSIONS:   1. Poorly visualized anatomical structures due to suboptimal image quality.   2. Left ventricular ejection fraction is hyperdynamic, calculated by Franco's biplane at 78%.   3. Left ventricular diastolic filling was indeterminate.   4. Left ventricular cavity size is decreased.   5. There is normal right ventricular global systolic function.   6. There is a moderate pericardial effusion seen anterior to the right ventricle and containining loculated material   7. Slightly elevated right ventricular systolic pressure.   8. There is no evidence of a patent foramen ovale.    QUANTITATIVE DATA SUMMARY:     2D  MEASUREMENTS:          Normal Ranges:  IVSd:            1.00 cm  (0.6-1.1cm)  LVPWd:           0.80 cm  (0.6-1.1cm)  LVIDd:           3.30 cm  (3.9-5.9cm)  LVIDs:           2.70 cm  LV Mass Index:   45 g/m2  LVEDV Index:     34 ml/m2  LV % FS          18.2 %       LV SYSTOLIC FUNCTION BY 2D PLANIMETRY (MOD):                       Normal Ranges:  EF-A4C View:    83 % (>=55%)  EF-A2C View:    73 %  EF-Biplane:     78 %  LV EF Reported: 78 %       RIGHT VENTRICLE:  RV s' 0.20 m/s       TRICUSPID VALVE/RVSP:          Normal Ranges:  Peak TR Velocity:     2.79 m/s  RV Syst Pressure:     34 mmHg  (< 30mmHg)       [id number] Christal Kellogg MD  Electronically signed on 9/13/2024 at 1:14:31 PM       ** Final **  XR chest 1 view  Narrative: Interpreted By:  Cr Mendez and Hofer Lindsay   STUDY:  XR CHEST 1 VIEW;  9/13/2024 2:47 am      INDICATION:  Signs/Symptoms:b/l pleural effusion.      COMPARISON:  Chest radiograph 09/08/2024      ACCESSION NUMBER(S):  [id number]      ORDERING CLINICIAN:  MAYRA HAYWOOD      FINDINGS:  AP radiograph of the chest was provided.          CARDIOMEDIASTINAL SILHOUETTE:  Cardiomediastinal silhouette is normal in size and configuration.      LUNGS:  Hazy opacity within the left lower lung. Aeration of the left  costophrenic angle is obscured by overlying medical device. Subtle  hazy opacity in the right lower lung. The right costophrenic angle is  clear. No evidence of pneumothorax.      ABDOMEN:  No remarkable upper abdominal findings.      BONES:  No acute osseous changes.      Impression: 1.  Hazy opacity within the left lower lung, which could represent a  small left-sided pleural effusion versus atelectasis. Correlate with  fluid status.  2. Subtle hazy opacity within the right lower lung, which could  represent small pleural effusion versus atelectasis versus  combination.      I personally reviewed the images/study and resident's interpretation  and I agree with the findings as stated  by Hayley Gonzalez MD (resident  radiologist). This study was analyzed and interpreted at [facility], [place], [place].      MACRO:  None      Signed by: Cr Mendez 9/13/2024 9:17 AM  Dictation workstation:   [id number]      Physical Exam    Relevant Results  {If you would like to pull in Medications, type .meds     If you would like to pull in Lab results for the last 24 hours, type .[id number]    If you would like to pull in Imaging results, type .imgrslt :99}      {Link to Stroke Scoring tools - Link :99}       Assessment/Plan                  Assessment & Plan  Alcoholic liver failure (Multi)    ***    {Current documented malnutrition diagnosis is Severe malnutrition related to acute disease or injury (could be multifactorial (social/environmental and chronic as well))   As evidenced by severe muscle wasting and fat loss (visual), reported not eating anything for weeks/ETOH use (predicted intake <50% of needs for >/=5 days)   :99}  {Accept, Reject, Defer:[id number]}         Ortiz Julian MD

## 2024-09-14 NOTE — PROGRESS NOTES
Manuel Bowser is a 63 y.o. male on day 1 of admission presenting with Alcoholic liver failure (Multi).      Subjective   NAEON. Says he accidentally urinated and didn't have any discomfort. Wife on phone said he visited outside hospital because of dizziness and falls which which have been going on since the beginning of last year. She also said he is mostly mostly in pain from injuries sustained during falls. Patient is also said to be disoriented for some month now and stopped taking alcohol for about 4 months since he was told his liver was not doing well. Wife admitted to episodes of body swelling including the feet/hands and abdomen.He is said to have presented to [facility] with discomfort in his chest and abdomen, as well as dizziness and falls.Wife says he hasn't started any new meds and doesn't take any herbal preparations.    Objective     Last Recorded Vitals  /71 (BP Location: Left arm, Patient Position: Sitting)   Pulse 60   Temp 36.4 °C (97.5 °F) (Temporal)   Resp 16   Wt 67.4 kg (148 lb 9.4 oz)   SpO2 99%   Intake/Output last 3 Shifts:    Intake/Output Summary (Last 24 hours) at 9/14/2024 1742  Last data filed at 9/14/2024 1000  Gross per 24 hour   Intake 480 ml   Output --   Net 480 ml       Admission Weight  Weight: 67.5 kg (148 lb 13 oz) (09/13/24 0100)    Daily Weight  09/14/24 : 67.4 kg (148 lb 9.4 oz)    Image Results  US liver with doppler  Narrative: Interpreted By:  Manuel Rowley,  and Javier Li   STUDY:  US LIVER WITH DOPPLER;  9/13/2024 3:58 pm      INDICATION:  Signs/Symptoms:Liver failure vs liver injury.          COMPARISON:  None.      ACCESSION NUMBER(S):  [id number]      ORDERING CLINICIAN:  JONO ESTRADA      TECHNIQUE:  Multiple images of the right upper quadrant were obtained.  Gray  scale, color Doppler and spectral Doppler waveform analysis was  performed.  This examination was interpreted at [facility], [facility].      FINDINGS:  LIVER:  The liver measures 14.7 cm and is diffusely echogenic in appearance,  consistent with diffuse fatty infiltration. The resulting increased  beam attenuation thereby limiting evaluation of the liver for focal  lesions. Within the limitations, no focal lesions are seen.      GALLBLADDER:  The gallbladder is nondistended. Mild smooth thickening of the  gallbladder wall measuring 0.4 cm.      Echogenic sludge within the gallbladder lumen. No discrete  cholelithiasis.      Sonographic Parks's sign is negative.      BILIARY SYSTEM:  No evidence of intra or extrahepatic biliary dilatation is  identified; the common bile duct measures 0.3 cm.      DOPPLER EVALUATION:      HEPATIC ARTERIES:  Hepatic artery and its right and left branches RI's are estimated at  0.8, .82 and 0.6, respectively.      PORTAL VEIN:  Portal vein is patent and measures 1.2 cm. There is normal  respiratory variation. Portal vein velocities are calculated as  follows: main portal vein 11.6 cm/s, antegrade flow; left portal vein  branch 10.4 cm/s, antegrade flow; right portal vein anterior branch  19.9, antegrade flow; right portal vein posterior branch 14.8,  antegrade flow. The splenic vein is also patent.      HEPATIC VEIN:  The right, middle and left hepatic veins are patent and demonstrate  triphasic antegrade flow. IVC appears also patent.      PANCREAS:  The pancreas is poorly visualized due to overlying bowel gas.      RIGHT KIDNEY:  The right kidney measures 9.8 cm in length. No hydronephrosis or  renal calculi are seen.      There is a cyst of the right interpolar region measuring 0.8 x 0.8 x  0.7 cm.      SPLEEN:  The spleen measures 6.6 cm and is grossly unremarkable.      PERITONEUM AND RETROPERITONEUM:  There is a small volume of abdominal ascites. Note is made of a right  pleural effusion.      Impression: 1. Biliary sludge with mild diffuse thickening of the gallbladder  wall, likely related to ascites.  Otherwise the gallbladder is  nondilated without cholelithiasis or sonographic evidence of acute  cholecystitis.  2. Echogenic appearance of the liver compatible with steatosis.  3. Normal Doppler interrogation of the hepatic vasculature.  4. Mild abdominal ascites and a right pleural effusion.      I personally reviewed the image(s)/study and resident interpretation  as stated by Dr. Jen Herrera MD. I agree with the findings as  stated. This study was interpreted at [facility], [place], [place].      MACRO:  None      Signed by: Manuel Rowley 9/13/2024 4:45 PM  Dictation workstation:   [id number]  Transthoracic Echo (TTE) Limited     [facility], [address] [place] [address]                 Tel [phone] and Fax [phone]    TRANSTHORACIC ECHOCARDIOGRAM REPORT       Patient Name:      MANUEL INGRAM     Reading Physician:    [id number] Christal Kellogg MD  Study Date:        9/13/2024            Ordering Provider:    [id number] JONO ESTRADA  MRN/PID:           [id number]             Fellow:  Accession#:        [id number]         Nurse:  Date of Birth/Age: 1960 / 63 years Sonographer:          Joseph White RDCS  Gender:            M                    Additional Staff:  Height:            175.26 cm            Admit Date:  Weight:            67.13 kg             Admission Status:     Inpatient -                                                                Routine  BSA / BMI:         1.82 m2 / 21.86      Encounter#:           [id number]                     kg/m2  Blood Pressure:    109/65 mmHg          Department Location:  [address]    Study Type:    TRANSTHORACIC ECHO (TTE) LIMITED  Diagnosis/ICD: Encounter for screening for cardiovascular disorders-Z13.6  Indication:    acute decompensated liver disease,  pleural effusions  CPT Code:      Echo Limited-59436; Doppler Limited-10207; Color Doppler-84058    Patient History:  Pertinent History: HFpEF, DM, HTN, DLD, alcohol use disorder.    Study Detail: The following Echo studies were performed: M-Mode, 2D, Doppler and                color flow. Technically challenging study due to body habitus,                poor acoustic windows, prominent lung artifact and patient lying                in supine position. Definity used as a contrast agent for                endocardial border definition. Total contrast used for this                procedure was 2.0 mL via IV push.       PHYSICIAN INTERPRETATION:  Left Ventricle: Left ventricular ejection fraction is hyperdynamic, calculated by Franco's biplane at 78%. The left ventricular cavity size is decreased. Left ventricular diastolic filling was indeterminate. Patient with significant tachycardia during the study.  Left Atrium: The left atrium was not well visualized. There is no evidence of a patent foramen ovale. A bubble study using agitated saline was performed.  Right Ventricle: The right ventricle is normal in size. There is normal right ventricular global systolic function.  Right Atrium: The right atrium is normal in size.  Aortic Valve: The aortic valve was not well visualized. There is trace aortic valve regurgitation.  Mitral Valve: The mitral valve is normal in structure. There is trace mitral valve regurgitation.  Tricuspid Valve: The tricuspid valve is structurally normal. There is mild tricuspid regurgitation. The Doppler estimated RVSP is slightly elevated at 34.1 mmHg.  Pulmonic Valve: The pulmonic valve was not assessed. Pulmonic valve regurgitation was not assessed.  Pericardium: There is a moderate pericardial effusion. The pericardial effusion is loculated. Seen anteriorly to the right ventricle.  Aorta: The aortic root is normal.  Systemic Veins: The inferior vena cava size appears small, IVC inspiratory  collapse greater than 50%.  In comparison to the previous echocardiogram(s): There are no prior studies on this patient for comparison purposes.       CONCLUSIONS:   1. Poorly visualized anatomical structures due to suboptimal image quality.   2. Left ventricular ejection fraction is hyperdynamic, calculated by Franco's biplane at 78%.   3. Left ventricular diastolic filling was indeterminate.   4. Left ventricular cavity size is decreased.   5. There is normal right ventricular global systolic function.   6. There is a moderate pericardial effusion seen anterior to the right ventricle and containining loculated material   7. Slightly elevated right ventricular systolic pressure.   8. There is no evidence of a patent foramen ovale.    QUANTITATIVE DATA SUMMARY:     2D MEASUREMENTS:          Normal Ranges:  IVSd:            1.00 cm  (0.6-1.1cm)  LVPWd:           0.80 cm  (0.6-1.1cm)  LVIDd:           3.30 cm  (3.9-5.9cm)  LVIDs:           2.70 cm  LV Mass Index:   45 g/m2  LVEDV Index:     34 ml/m2  LV % FS          18.2 %       LV SYSTOLIC FUNCTION BY 2D PLANIMETRY (MOD):                       Normal Ranges:  EF-A4C View:    83 % (>=55%)  EF-A2C View:    73 %  EF-Biplane:     78 %  LV EF Reported: 78 %       RIGHT VENTRICLE:  RV s' 0.20 m/s       TRICUSPID VALVE/RVSP:          Normal Ranges:  Peak TR Velocity:     2.79 m/s  RV Syst Pressure:     34 mmHg  (< 30mmHg)       [id number] Christal Kellogg MD  Electronically signed on 9/13/2024 at 1:14:31 PM       ** Final **  XR chest 1 view  Narrative: Interpreted By:  Cr Mendez and Hofer Lindsay   STUDY:  XR CHEST 1 VIEW;  9/13/2024 2:47 am      INDICATION:  Signs/Symptoms:b/l pleural effusion.      COMPARISON:  Chest radiograph 09/08/2024      ACCESSION NUMBER(S):  [id number]      ORDERING CLINICIAN:  MAYRA HAYWOOD      FINDINGS:  AP radiograph of the chest was provided.          CARDIOMEDIASTINAL SILHOUETTE:  Cardiomediastinal silhouette is normal in size and  configuration.      LUNGS:  Hazy opacity within the left lower lung. Aeration of the left  costophrenic angle is obscured by overlying medical device. Subtle  hazy opacity in the right lower lung. The right costophrenic angle is  clear. No evidence of pneumothorax.      ABDOMEN:  No remarkable upper abdominal findings.      BONES:  No acute osseous changes.      Impression: 1.  Hazy opacity within the left lower lung, which could represent a  small left-sided pleural effusion versus atelectasis. Correlate with  fluid status.  2. Subtle hazy opacity within the right lower lung, which could  represent small pleural effusion versus atelectasis versus  combination.      I personally reviewed the images/study and resident's interpretation  and I agree with the findings as stated by Hayley Gonzalez MD (resident  radiologist). This study was analyzed and interpreted at [facility], [place], [place].      MACRO:  None      Signed by: Cr Mendez 9/13/2024 9:17 AM  Dictation workstation:   [id number]      Physical Exam  Physical Exam  Constitutional:       General: He is not in acute distress.     Appearance: He is not diaphoretic.   HENT:      Head: Normocephalic.      Mouth/Throat:      Mouth: Mucous membranes are moist.   Cardiovascular:      Rate and Rhythm: Normal rate and regular rhythm.      Heart sounds: No murmur heard.  Pulmonary:      Effort: No respiratory distress.      Breath sounds: No rhonchi or rales.   Abdominal:      General: There is no distension.      Palpations: There is no mass.      Tenderness: There is no abdominal tenderness. There is no guarding or rebound.   Musculoskeletal:         General: No swelling.      Right lower leg: No edema.      Left lower leg: No edema.   Skin:     Capillary Refill: Capillary refill takes less than 2 seconds.      Coloration: Skin is pale. Skin is not jaundiced.      Findings: No bruising.   Neurological:      General: No focal  deficit present.      Mental Status: He is alert and oriented to person, place, and time.      Comments: Appeared a bit delirious     Relevant Results  Results for orders placed or performed during the hospital encounter of 09/13/24 (from the past 24 hour(s))   CBC and Auto Differential   Result Value Ref Range    WBC 8.1 4.4 - 11.3 x10*3/uL    nRBC 0.7 (H) 0.0 - 0.0 /100 WBCs    RBC 3.30 (L) 4.50 - 5.90 x10*6/uL    Hemoglobin 9.4 (L) 13.5 - 17.5 g/dL    Hematocrit 28.2 (L) 41.0 - 52.0 %    MCV 86 80 - 100 fL    MCH 28.5 26.0 - 34.0 pg    MCHC 33.3 32.0 - 36.0 g/dL    RDW 15.9 (H) 11.5 - 14.5 %    Platelets 112 (L) 150 - 450 x10*3/uL    Immature Granulocytes %, Automated 0.7 0.0 - 0.9 %    Immature Granulocytes Absolute, Automated 0.06 0.00 - 0.70 x10*3/uL   Manual Differential   Result Value Ref Range    Neutrophils %, Manual 90.4 40.0 - 80.0 %    Lymphocytes %, Manual 7.8 13.0 - 44.0 %    Monocytes %, Manual 1.8 2.0 - 10.0 %    Eosinophils %, Manual 0.0 0.0 - 6.0 %    Basophils %, Manual 0.0 0.0 - 2.0 %    Seg Neutrophils Absolute, Manual 7.32 (H) 1.20 - 7.00 x10*3/uL    Lymphocytes Absolute, Manual 0.63 (L) 1.20 - 4.80 x10*3/uL    Monocytes Absolute, Manual 0.15 0.10 - 1.00 x10*3/uL    Eosinophils Absolute, Manual 0.00 0.00 - 0.70 x10*3/uL    Basophils Absolute, Manual 0.00 0.00 - 0.10 x10*3/uL    Total Cells Counted 115     RBC Morphology See Below     Target Cells Few     Perry Cells Few     Acanthocytes Few    Comprehensive metabolic panel   Result Value Ref Range    Glucose 112 (H) 74 - 99 mg/dL    Sodium 138 136 - 145 mmol/L    Potassium 2.9 (LL) 3.5 - 5.3 mmol/L    Chloride 104 98 - 107 mmol/L    Bicarbonate 23 21 - 32 mmol/L    Anion Gap 14 10 - 20 mmol/L    Urea Nitrogen 7 6 - 23 mg/dL    Creatinine 0.91 0.50 - 1.30 mg/dL    eGFR >90 >60 mL/min/1.73m*2    Calcium 7.1 (L) 8.6 - 10.6 mg/dL    Albumin 1.5 (L) 3.4 - 5.0 g/dL    Alkaline Phosphatase 693 (H) 33 - 136 U/L    Total Protein 4.7 (L) 6.4 - 8.2 g/dL      (H) 9 - 39 U/L    Bilirubin, Total 2.5 (H) 0.0 - 1.2 mg/dL     (H) 10 - 52 U/L   Coagulation Screen   Result Value Ref Range    Protime 17.5 (H) 9.8 - 12.8 seconds    INR 1.5 (H) 0.9 - 1.1    aPTT 42 (H) 27 - 38 seconds   Magnesium   Result Value Ref Range    Magnesium 1.73 1.60 - 2.40 mg/dL   CBC and Auto Differential   Result Value Ref Range    WBC 7.0 4.4 - 11.3 x10*3/uL    nRBC 0.6 (H) 0.0 - 0.0 /100 WBCs    RBC 3.38 (L) 4.50 - 5.90 x10*6/uL    Hemoglobin 9.6 (L) 13.5 - 17.5 g/dL    Hematocrit 29.7 (L) 41.0 - 52.0 %    MCV 88 80 - 100 fL    MCH 28.4 26.0 - 34.0 pg    MCHC 32.3 32.0 - 36.0 g/dL    RDW 16.1 (H) 11.5 - 14.5 %    Platelets 109 (L) 150 - 450 x10*3/uL    Neutrophils % 74.8 40.0 - 80.0 %    Immature Granulocytes %, Automated 0.6 0.0 - 0.9 %    Lymphocytes % 18.1 13.0 - 44.0 %    Monocytes % 6.4 2.0 - 10.0 %    Eosinophils % 0.0 0.0 - 6.0 %    Basophils % 0.1 0.0 - 2.0 %    Neutrophils Absolute 5.22 1.20 - 7.70 x10*3/uL    Immature Granulocytes Absolute, Automated 0.04 0.00 - 0.70 x10*3/uL    Lymphocytes Absolute 1.26 1.20 - 4.80 x10*3/uL    Monocytes Absolute 0.45 0.10 - 1.00 x10*3/uL    Eosinophils Absolute 0.00 0.00 - 0.70 x10*3/uL    Basophils Absolute 0.01 0.00 - 0.10 x10*3/uL        Assessment/Plan      Assessment & Plan  Alcoholic liver failure (Multi)    Acute on chronic alcoholic liver disease (Multi)    Hepatic encephalopathy (Multi)    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 He was started on Lactulose from OSH and had acetylcysteine since his admission to  MICU. Was said to be encephalopathic at OSH but was AOX4 upon arrival to  MICU. Hepatology was consulted and would  appreciate their recs. Potassium levels came low and was replenished.     #Acute liver injury/failure, possible acute on chronic liver disease  #HE  #Coagulopathy  -Hx of chronic alcohol use disorder  -Patient presents with acute liver injury with INR >9.5 +/- failure given reported encephalopathy with A&Ox2 at OSH. Currently A&Ox4, no asterixis or c/f current encephalopathy though has been treated with lactulose  -Hepatic function panel severely deranged-Alb-1.7,ALP-804,ALT-278,AST-945  -Hepatitis panel, A1AT, Ceruloplasmin,HSV1&2, Acetaminophen level,AFP,Fibrinogen,MICHAEL are all within normal limits  -Ferritin>9000,serum iron-85 WNL  -EBV DNA<35 detected  -IgG-1600  -AMA-29.3 (uln 24.9, >25 is positive), repeat AMA-9/14 is negative  -liver US shows echogenic appearance of the liver compatible with steatosis and normal Doppler interrogation of the hepatic vasculature. There is mild abdominal ascites and a right pleural effusion   -S/p IV Vit K x1 9/11, 1u FFP 9/12 with INR >9.5, now 2.8,aPTT-125.  -had Acetylcysteine in the MICU  -INR-2.8-->1.5-9/14, ALP-804-->693-9/14, AST-945-->401-9/14, ALT-278-->169-9/14.  -MELD-18, MELD-Na-14-9/14  Plan-  -Hepatology following, appreciate recs  -will follow up on pending hepatic work up  -continue with lactulose, titrating to 3-4 BM/day   -Monitor Hgb, Coags, signs of bleed  -Daily MELD labs     #Acute anaemia/concern for hemolysis  -Hgb 12.0 to 9.5   - pale but not jaundiced  -Tbili-2.7, indirect-1.4, LDH-846, Haptoglobin<30  -peripheral blood smear-Mild microcytosis with many target cells and many Pappenheimer bodies. Findings consistent with hemoglobinopathy.   -Hb remains stable   Plan-  -monitor Hb     # Traumatic catheterization  -Difficult catheterization at MICU with bleeding.  -patient accidentally urinated without difficulty  Plan-  -bladder scan to rule out retention of urine if suspected  -urology to pass catheter if there are concerns for retention      #HFpEF  #Tachycardia  -limited ECHO -9/13 shows EF of 78%  -CXR without signs of pulmonary edema  -Tachycardia resolved  -on home metop 25mg bid     FEN  Fluids: PRN  Electrolytes: K>4, Mg>2  Nutrition: Adult regular diet  Prophylaxis:  DVT ppx: chem ppx held iso coagulopathy, SCDs  GI ppx: PPI  Bowel care: lactulose  Hardware:                        Social:  Code: Full Code    HPOA: Mayela (wife) [phone]  Disposition: Floor     Plan is not final until reviewed by Attending-DO Ortiz Jurado MD

## 2024-09-15 VITALS
DIASTOLIC BLOOD PRESSURE: 80 MMHG | OXYGEN SATURATION: 98 % | WEIGHT: 148.81 LBS | SYSTOLIC BLOOD PRESSURE: 136 MMHG | RESPIRATION RATE: 17 BRPM | HEART RATE: 71 BPM | HEIGHT: 69 IN | BODY MASS INDEX: 22.04 KG/M2 | TEMPERATURE: 97.9 F

## 2024-09-15 LAB
ALBUMIN SERPL BCP-MCNC: 1.6 G/DL (ref 3.4–5)
ALP SERPL-CCNC: 637 U/L (ref 33–136)
ALT SERPL W P-5'-P-CCNC: 127 U/L (ref 10–52)
ANION GAP SERPL CALC-SCNC: 12 MMOL/L (ref 10–20)
APTT PPP: 35 SECONDS (ref 27–38)
AST SERPL W P-5'-P-CCNC: 255 U/L (ref 9–39)
BILIRUB DIRECT SERPL-MCNC: 1.3 MG/DL (ref 0–0.3)
BILIRUB SERPL-MCNC: 2.3 MG/DL (ref 0–1.2)
BUN SERPL-MCNC: 7 MG/DL (ref 6–23)
CALCIUM SERPL-MCNC: 6.8 MG/DL (ref 8.6–10.6)
CHLORIDE SERPL-SCNC: 104 MMOL/L (ref 98–107)
CO2 SERPL-SCNC: 25 MMOL/L (ref 21–32)
CREAT SERPL-MCNC: 1.02 MG/DL (ref 0.5–1.3)
EGFRCR SERPLBLD CKD-EPI 2021: 83 ML/MIN/1.73M*2
FERRITIN SERPL-MCNC: 3157 NG/ML (ref 20–300)
GLUCOSE SERPL-MCNC: 157 MG/DL (ref 74–99)
HAPTOGLOB SERPL NEPH-MCNC: <30 MG/DL (ref 30–200)
HGB RETIC QN: 30 PG (ref 28–38)
IMMATURE RETIC FRACTION: 8.8 %
INR PPP: 1.3 (ref 0.9–1.1)
LDH SERPL L TO P-CCNC: 457 U/L (ref 84–246)
LKM AB TITR SER IF: NORMAL {TITER}
MAGNESIUM SERPL-MCNC: 1.9 MG/DL (ref 1.6–2.4)
PHOSPHATE SERPL-MCNC: 3 MG/DL (ref 2.5–4.9)
POTASSIUM SERPL-SCNC: 3.2 MMOL/L (ref 3.5–5.3)
PROT SERPL-MCNC: 4.6 G/DL (ref 6.4–8.2)
PROTHROMBIN TIME: 14.6 SECONDS (ref 9.8–12.8)
RETICS #: 0.04 X10*6/UL (ref 0.02–0.12)
RETICS/RBC NFR AUTO: 1.3 % (ref 0.5–2)
SODIUM SERPL-SCNC: 138 MMOL/L (ref 136–145)

## 2024-09-15 PROCEDURE — 2500000004 HC RX 250 GENERAL PHARMACY W/ HCPCS (ALT 636 FOR OP/ED)

## 2024-09-15 PROCEDURE — 2500000001 HC RX 250 WO HCPCS SELF ADMINISTERED DRUGS (ALT 637 FOR MEDICARE OP)

## 2024-09-15 PROCEDURE — 2500000005 HC RX 250 GENERAL PHARMACY W/O HCPCS

## 2024-09-15 PROCEDURE — 82728 ASSAY OF FERRITIN: CPT

## 2024-09-15 PROCEDURE — 85610 PROTHROMBIN TIME: CPT

## 2024-09-15 PROCEDURE — 99232 SBSQ HOSP IP/OBS MODERATE 35: CPT

## 2024-09-15 PROCEDURE — 84100 ASSAY OF PHOSPHORUS: CPT

## 2024-09-15 PROCEDURE — 80048 BASIC METABOLIC PNL TOTAL CA: CPT

## 2024-09-15 PROCEDURE — 1100000001 HC PRIVATE ROOM DAILY

## 2024-09-15 PROCEDURE — 83615 LACTATE (LD) (LDH) ENZYME: CPT

## 2024-09-15 PROCEDURE — 36415 COLL VENOUS BLD VENIPUNCTURE: CPT

## 2024-09-15 PROCEDURE — 2500000002 HC RX 250 W HCPCS SELF ADMINISTERED DRUGS (ALT 637 FOR MEDICARE OP, ALT 636 FOR OP/ED)

## 2024-09-15 PROCEDURE — 99233 SBSQ HOSP IP/OBS HIGH 50: CPT | Performed by: STUDENT IN AN ORGANIZED HEALTH CARE EDUCATION/TRAINING PROGRAM

## 2024-09-15 PROCEDURE — 83735 ASSAY OF MAGNESIUM: CPT

## 2024-09-15 PROCEDURE — 82248 BILIRUBIN DIRECT: CPT

## 2024-09-15 RX ORDER — POTASSIUM CHLORIDE 20 MEQ/1
40 TABLET, EXTENDED RELEASE ORAL ONCE
Status: COMPLETED | OUTPATIENT
Start: 2024-09-15 | End: 2024-09-15

## 2024-09-15 RX ORDER — POTASSIUM CHLORIDE 14.9 MG/ML
20 INJECTION INTRAVENOUS ONCE
Status: COMPLETED | OUTPATIENT
Start: 2024-09-15 | End: 2024-09-15

## 2024-09-15 ASSESSMENT — COGNITIVE AND FUNCTIONAL STATUS - GENERAL
CLIMB 3 TO 5 STEPS WITH RAILING: A LOT
MOVING TO AND FROM BED TO CHAIR: A LITTLE
STANDING UP FROM CHAIR USING ARMS: A LITTLE
TURNING FROM BACK TO SIDE WHILE IN FLAT BAD: A LITTLE
MOVING FROM LYING ON BACK TO SITTING ON SIDE OF FLAT BED WITH BEDRAILS: A LITTLE
DRESSING REGULAR UPPER BODY CLOTHING: A LITTLE
TOILETING: A LOT
WALKING IN HOSPITAL ROOM: A LITTLE
MOVING TO AND FROM BED TO CHAIR: A LITTLE
HELP NEEDED FOR BATHING: A LITTLE
TURNING FROM BACK TO SIDE WHILE IN FLAT BAD: A LITTLE
HELP NEEDED FOR BATHING: A LITTLE
DRESSING REGULAR UPPER BODY CLOTHING: A LITTLE
TOILETING: A LITTLE
DAILY ACTIVITIY SCORE: 19
MOVING FROM LYING ON BACK TO SITTING ON SIDE OF FLAT BED WITH BEDRAILS: A LITTLE
WALKING IN HOSPITAL ROOM: A LITTLE
MOVING FROM LYING ON BACK TO SITTING ON SIDE OF FLAT BED WITH BEDRAILS: A LITTLE
MOBILITY SCORE: 16
MOBILITY SCORE: 17
DAILY ACTIVITIY SCORE: 20
MOVING TO AND FROM BED TO CHAIR: A LITTLE
DRESSING REGULAR LOWER BODY CLOTHING: A LITTLE
DRESSING REGULAR UPPER BODY CLOTHING: A LITTLE
TURNING FROM BACK TO SIDE WHILE IN FLAT BAD: A LITTLE
TOILETING: A LITTLE
CLIMB 3 TO 5 STEPS WITH RAILING: A LOT
CLIMB 3 TO 5 STEPS WITH RAILING: A LOT
HELP NEEDED FOR BATHING: A LITTLE
MOBILITY SCORE: 17
STANDING UP FROM CHAIR USING ARMS: A LITTLE
STANDING UP FROM CHAIR USING ARMS: A LITTLE
DAILY ACTIVITIY SCORE: 21
DRESSING REGULAR LOWER BODY CLOTHING: A LITTLE
WALKING IN HOSPITAL ROOM: A LOT

## 2024-09-15 ASSESSMENT — PAIN SCALES - GENERAL
PAINLEVEL_OUTOF10: 0 - NO PAIN
PAINLEVEL_OUTOF10: 8
PAINLEVEL_OUTOF10: 0 - NO PAIN

## 2024-09-15 ASSESSMENT — PAIN SCALES - WONG BAKER: WONGBAKER_NUMERICALRESPONSE: NO HURT

## 2024-09-15 NOTE — CARE PLAN
Problem: Fall/Injury  Goal: Not fall by end of shift  Outcome: Progressing  Goal: Be free from injury by end of the shift  Outcome: Progressing  Goal: Verbalize understanding of personal risk factors for fall in the hospital  Outcome: Progressing  Goal: Verbalize understanding of risk factor reduction measures to prevent injury from fall in the home  Outcome: Progressing  Goal: Use assistive devices by end of the shift  Outcome: Progressing  Goal: Pace activities to prevent fatigue by end of the shift  Outcome: Progressing   The patient's goals for the shift include      The clinical goals for the shift include Progressing

## 2024-09-15 NOTE — PROGRESS NOTES
HEPATOLOGY ATTENDING NOTE    I have seen and examined the patient in conjunction with Dr. Nolen.    # Elevated liver test not consistent with alcoholic hepatitis  # Alcohol-related liver disease, steatosis  # Ascites     The patient is liver enzymes remain elevated today although they are decreasing.  This pattern of elevation is  Is not consistent with alcoholic hepatitis and could possibly an infiltrative disease or a other systemic process.  The patient has coexisting indirect hyperbilirubinemia, possible hemolytic anemia, markedly elevated ferritin and a elevated LDH which is not typical for liver disease alone. His INR was elevated to >9.     The patient did have a positive antimitochondrial antibody sometime ago that is available in Care Everywhere however the repeat test today was negative.  Indicating he does not have primary biliary cholangitis.    Overall concern for an infiltrative process or secondary hematologic or rheumatologic process.  In talking with the patient today.  He reports no recent alcohol use, his last drink was beer over a month ago.  His phosphatidyl ethanol level is pending.  This could be drug-induced liver injury.     The patient does endorse some chronic right-sided hip pain.  On physical exam he does not appear to have any new rashes but does have excoriations on his body and possible skin breaks in his legs.  There is no definite joint effusion or signs of an inflammatory joint disorder.    We would recommend the following tests:  1. Gamma-glutamyltransferase  2. Peripheral smear to assess for hemolysis (if not done already)  3. Transjugular liver biopsy with interventional radiology (IR consult required, NPO after midnight)  4. During the liver biopsy, hepatic venous pressure gradient measurements should be obtained  5. Follow up on Peth and remaining work up  6. Repeat Ferritin  7. Consider hematology consult pending hemolysis work up  8. Paracentesis with the following  studies:   1. Cell count   2. Fluid culture   3. Ascites protein   4. Ascites albumin   5. Cytology   6. LDH    Min Ansari MD  Rest per fellow note by Dr. Nolen.

## 2024-09-15 NOTE — PROGRESS NOTES
Manuel Bowser is a 63 y.o. male on day 2 of admission presenting with Alcoholic liver failure (Multi).      Subjective   NAEON. Patient oriented x4 this morning. Speaking in full clear sentences. No acute complaints and is comfortable in bed.     Objective     Last Recorded Vitals  /78   Pulse 67   Temp 36.4 °C (97.5 °F) (Temporal)   Resp 17   Wt 67.5 kg (148 lb 13 oz)   SpO2 100%   Intake/Output last 3 Shifts:    Intake/Output Summary (Last 24 hours) at 9/15/2024 1116  Last data filed at 9/15/2024 0546  Gross per 24 hour   Intake 407 ml   Output 100 ml   Net 307 ml       Admission Weight  Weight: 67.5 kg (148 lb 13 oz) (09/13/24 0100)    Daily Weight  09/15/24 : 67.5 kg (148 lb 13 oz)    Image Results  US liver with doppler  Narrative: Interpreted By:  Kasprzak, Manuel,  and [place] Jen   STUDY:  US LIVER WITH DOPPLER;  9/13/2024 3:58 pm      INDICATION:  Signs/Symptoms:Liver failure vs liver injury.          COMPARISON:  None.      ACCESSION NUMBER(S):  [id number]      ORDERING CLINICIAN:  JONO ESTRADA      TECHNIQUE:  Multiple images of the right upper quadrant were obtained.  Gray  scale, color Doppler and spectral Doppler waveform analysis was  performed.  This examination was interpreted at [facility].      FINDINGS:  LIVER:  The liver measures 14.7 cm and is diffusely echogenic in appearance,  consistent with diffuse fatty infiltration. The resulting increased  beam attenuation thereby limiting evaluation of the liver for focal  lesions. Within the limitations, no focal lesions are seen.      GALLBLADDER:  The gallbladder is nondistended. Mild smooth thickening of the  gallbladder wall measuring 0.4 cm.      Echogenic sludge within the gallbladder lumen. No discrete  cholelithiasis.      Sonographic Parks's sign is negative.      BILIARY SYSTEM:  No evidence of intra or extrahepatic biliary dilatation is  identified; the common bile duct  measures 0.3 cm.      DOPPLER EVALUATION:      HEPATIC ARTERIES:  Hepatic artery and its right and left branches RI's are estimated at  0.8, .82 and 0.6, respectively.      PORTAL VEIN:  Portal vein is patent and measures 1.2 cm. There is normal  respiratory variation. Portal vein velocities are calculated as  follows: main portal vein 11.6 cm/s, antegrade flow; left portal vein  branch 10.4 cm/s, antegrade flow; right portal vein anterior branch  19.9, antegrade flow; right portal vein posterior branch 14.8,  antegrade flow. The splenic vein is also patent.      HEPATIC VEIN:  The right, middle and left hepatic veins are patent and demonstrate  triphasic antegrade flow. IVC appears also patent.      PANCREAS:  The pancreas is poorly visualized due to overlying bowel gas.      RIGHT KIDNEY:  The right kidney measures 9.8 cm in length. No hydronephrosis or  renal calculi are seen.      There is a cyst of the right interpolar region measuring 0.8 x 0.8 x  0.7 cm.      SPLEEN:  The spleen measures 6.6 cm and is grossly unremarkable.      PERITONEUM AND RETROPERITONEUM:  There is a small volume of abdominal ascites. Note is made of a right  pleural effusion.      Impression: 1. Biliary sludge with mild diffuse thickening of the gallbladder  wall, likely related to ascites. Otherwise the gallbladder is  nondilated without cholelithiasis or sonographic evidence of acute  cholecystitis.  2. Echogenic appearance of the liver compatible with steatosis.  3. Normal Doppler interrogation of the hepatic vasculature.  4. Mild abdominal ascites and a right pleural effusion.      I personally reviewed the image(s)/study and resident interpretation  as stated by Dr. Jen Herrera MD. I agree with the findings as  stated. This study was interpreted at [facility], [place], [place].      MACRO:  None      Signed by: Manuel Rowley 9/13/2024 4:45 PM  Dictation workstation:    [id number]  Transthoracic Echo (TTE) Limited     [facility], [address] [place] [address]                 Tel [phone] and Fax [phone]    TRANSTHORACIC ECHOCARDIOGRAM REPORT       Patient Name:      EDVIN oGmez Physician:    [id number] Christal Kellogg MD  Study Date:        9/13/2024            Ordering Provider:    [id number] JONO ESTRADA  MRN/PID:           [id number]             Fellow:  Accession#:        [id number]         Nurse:  Date of Birth/Age: 1960 / 63 years Sonographer:          Joseph White RDCS  Gender:            M                    Additional Staff:  Height:            175.26 cm            Admit Date:  Weight:            67.13 kg             Admission Status:     Inpatient -                                                                Routine  BSA / BMI:         1.82 m2 / 21.86      Encounter#:           [id number]                     kg/m2  Blood Pressure:    109/65 mmHg          Department Location:  [address]    Study Type:    TRANSTHORACIC ECHO (TTE) LIMITED  Diagnosis/ICD: Encounter for screening for cardiovascular disorders-Z13.6  Indication:    acute decompensated liver disease, pleural effusions  CPT Code:      Echo Limited-42216; Doppler Limited-44845; Color Doppler-11258    Patient History:  Pertinent History: HFpEF, DM, HTN, DLD, alcohol use disorder.    Study Detail: The following Echo studies were performed: M-Mode, 2D, Doppler and                color flow. Technically challenging study due to body habitus,                poor acoustic windows, prominent lung artifact and patient lying                in supine position. Definity used as a contrast agent for                endocardial border definition. Total contrast used for this                procedure was 2.0 mL via IV push.       PHYSICIAN  INTERPRETATION:  Left Ventricle: Left ventricular ejection fraction is hyperdynamic, calculated by Franco's biplane at 78%. The left ventricular cavity size is decreased. Left ventricular diastolic filling was indeterminate. Patient with significant tachycardia during the study.  Left Atrium: The left atrium was not well visualized. There is no evidence of a patent foramen ovale. A bubble study using agitated saline was performed.  Right Ventricle: The right ventricle is normal in size. There is normal right ventricular global systolic function.  Right Atrium: The right atrium is normal in size.  Aortic Valve: The aortic valve was not well visualized. There is trace aortic valve regurgitation.  Mitral Valve: The mitral valve is normal in structure. There is trace mitral valve regurgitation.  Tricuspid Valve: The tricuspid valve is structurally normal. There is mild tricuspid regurgitation. The Doppler estimated RVSP is slightly elevated at 34.1 mmHg.  Pulmonic Valve: The pulmonic valve was not assessed. Pulmonic valve regurgitation was not assessed.  Pericardium: There is a moderate pericardial effusion. The pericardial effusion is loculated. Seen anteriorly to the right ventricle.  Aorta: The aortic root is normal.  Systemic Veins: The inferior vena cava size appears small, IVC inspiratory collapse greater than 50%.  In comparison to the previous echocardiogram(s): There are no prior studies on this patient for comparison purposes.       CONCLUSIONS:   1. Poorly visualized anatomical structures due to suboptimal image quality.   2. Left ventricular ejection fraction is hyperdynamic, calculated by Franco's biplane at 78%.   3. Left ventricular diastolic filling was indeterminate.   4. Left ventricular cavity size is decreased.   5. There is normal right ventricular global systolic function.   6. There is a moderate pericardial effusion seen anterior to the right ventricle and containining loculated material   7.  Slightly elevated right ventricular systolic pressure.   8. There is no evidence of a patent foramen ovale.    QUANTITATIVE DATA SUMMARY:     2D MEASUREMENTS:          Normal Ranges:  IVSd:            1.00 cm  (0.6-1.1cm)  LVPWd:           0.80 cm  (0.6-1.1cm)  LVIDd:           3.30 cm  (3.9-5.9cm)  LVIDs:           2.70 cm  LV Mass Index:   45 g/m2  LVEDV Index:     34 ml/m2  LV % FS          18.2 %       LV SYSTOLIC FUNCTION BY 2D PLANIMETRY (MOD):                       Normal Ranges:  EF-A4C View:    83 % (>=55%)  EF-A2C View:    73 %  EF-Biplane:     78 %  LV EF Reported: 78 %       RIGHT VENTRICLE:  RV s' 0.20 m/s       TRICUSPID VALVE/RVSP:          Normal Ranges:  Peak TR Velocity:     2.79 m/s  RV Syst Pressure:     34 mmHg  (< 30mmHg)       [id number] Christal Kellogg MD  Electronically signed on 9/13/2024 at 1:14:31 PM       ** Final **  XR chest 1 view  Narrative: Interpreted By:  Cr Mendez and Hofer Lindsay   STUDY:  XR CHEST 1 VIEW;  9/13/2024 2:47 am      INDICATION:  Signs/Symptoms:b/l pleural effusion.      COMPARISON:  Chest radiograph 09/08/2024      ACCESSION NUMBER(S):  [id number]      ORDERING CLINICIAN:  MAYRA HAYWOOD      FINDINGS:  AP radiograph of the chest was provided.          CARDIOMEDIASTINAL SILHOUETTE:  Cardiomediastinal silhouette is normal in size and configuration.      LUNGS:  Hazy opacity within the left lower lung. Aeration of the left  costophrenic angle is obscured by overlying medical device. Subtle  hazy opacity in the right lower lung. The right costophrenic angle is  clear. No evidence of pneumothorax.      ABDOMEN:  No remarkable upper abdominal findings.      BONES:  No acute osseous changes.      Impression: 1.  Hazy opacity within the left lower lung, which could represent a  small left-sided pleural effusion versus atelectasis. Correlate with  fluid status.  2. Subtle hazy opacity within the right lower lung, which could  represent small pleural effusion versus  atelectasis versus  combination.      I personally reviewed the images/study and resident's interpretation  and I agree with the findings as stated by Hayley Gonzalez MD (resident  radiologist). This study was analyzed and interpreted at [facility], [place], [place].      MACRO:  None      Signed by: Cr Coateson 9/13/2024 9:17 AM  Dictation workstation:   [id number]      Physical Exam  Physical Exam  Constitutional:       General: He is not in acute distress.     Appearance: He is not diaphoretic.   HENT:      Head: Normocephalic.      Mouth/Throat:      Mouth: Mucous membranes are moist.   Cardiovascular:      Rate and Rhythm: Normal rate and regular rhythm.      Heart sounds: No murmur heard.  Pulmonary:      Effort: No respiratory distress.      Breath sounds: No rhonchi or rales.   Abdominal:      General: There is no distension.      Palpations: There is no mass.      Tenderness: There is no abdominal tenderness. There is no guarding or rebound.   Musculoskeletal:         General: No swelling. No asterixis.      Right lower leg: No edema.      Left lower leg: No edema.   Skin:     Capillary Refill: Capillary refill takes less than 2 seconds.      Coloration: Skin is pale. Skin is not jaundiced.      Findings: No bruising.   Neurological:      General: No focal deficit present.      Mental Status: He is alert and oriented to person, place, and time.        Relevant Results  Results for orders placed or performed during the hospital encounter of 09/13/24 (from the past 24 hour(s))   Comprehensive metabolic panel   Result Value Ref Range    Glucose 112 (H) 74 - 99 mg/dL    Sodium 138 136 - 145 mmol/L    Potassium 2.9 (LL) 3.5 - 5.3 mmol/L    Chloride 104 98 - 107 mmol/L    Bicarbonate 23 21 - 32 mmol/L    Anion Gap 14 10 - 20 mmol/L    Urea Nitrogen 7 6 - 23 mg/dL    Creatinine 0.91 0.50 - 1.30 mg/dL    eGFR >90 >60 mL/min/1.73m*2    Calcium 7.1 (L) 8.6 - 10.6 mg/dL     Albumin 1.5 (L) 3.4 - 5.0 g/dL    Alkaline Phosphatase 693 (H) 33 - 136 U/L    Total Protein 4.7 (L) 6.4 - 8.2 g/dL     (H) 9 - 39 U/L    Bilirubin, Total 2.5 (H) 0.0 - 1.2 mg/dL     (H) 10 - 52 U/L   Coagulation Screen   Result Value Ref Range    Protime 17.5 (H) 9.8 - 12.8 seconds    INR 1.5 (H) 0.9 - 1.1    aPTT 42 (H) 27 - 38 seconds   Magnesium   Result Value Ref Range    Magnesium 1.73 1.60 - 2.40 mg/dL   CBC and Auto Differential   Result Value Ref Range    WBC 7.0 4.4 - 11.3 x10*3/uL    nRBC 0.6 (H) 0.0 - 0.0 /100 WBCs    RBC 3.38 (L) 4.50 - 5.90 x10*6/uL    Hemoglobin 9.6 (L) 13.5 - 17.5 g/dL    Hematocrit 29.7 (L) 41.0 - 52.0 %    MCV 88 80 - 100 fL    MCH 28.4 26.0 - 34.0 pg    MCHC 32.3 32.0 - 36.0 g/dL    RDW 16.1 (H) 11.5 - 14.5 %    Platelets 109 (L) 150 - 450 x10*3/uL    Neutrophils % 74.8 40.0 - 80.0 %    Immature Granulocytes %, Automated 0.6 0.0 - 0.9 %    Lymphocytes % 18.1 13.0 - 44.0 %    Monocytes % 6.4 2.0 - 10.0 %    Eosinophils % 0.0 0.0 - 6.0 %    Basophils % 0.1 0.0 - 2.0 %    Neutrophils Absolute 5.22 1.20 - 7.70 x10*3/uL    Immature Granulocytes Absolute, Automated 0.04 0.00 - 0.70 x10*3/uL    Lymphocytes Absolute 1.26 1.20 - 4.80 x10*3/uL    Monocytes Absolute 0.45 0.10 - 1.00 x10*3/uL    Eosinophils Absolute 0.00 0.00 - 0.70 x10*3/uL    Basophils Absolute 0.01 0.00 - 0.10 x10*3/uL   Renal function panel   Result Value Ref Range    Glucose 136 (H) 74 - 99 mg/dL    Sodium 139 136 - 145 mmol/L    Potassium 3.0 (L) 3.5 - 5.3 mmol/L    Chloride 103 98 - 107 mmol/L    Bicarbonate 25 21 - 32 mmol/L    Anion Gap 14 10 - 20 mmol/L    Urea Nitrogen 7 6 - 23 mg/dL    Creatinine 0.99 0.50 - 1.30 mg/dL    eGFR 86 >60 mL/min/1.73m*2    Calcium 7.3 (L) 8.6 - 10.6 mg/dL    Phosphorus 1.8 (L) 2.5 - 4.9 mg/dL    Albumin 1.7 (L) 3.4 - 5.0 g/dL   Magnesium   Result Value Ref Range    Magnesium 1.97 1.60 - 2.40 mg/dL        Assessment/Plan      Assessment & Plan  Alcoholic liver failure  (Multi)    Acute on chronic alcoholic liver disease (Multi)    Hepatic encephalopathy (Multi)    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 He was started on Lactulose from OSH and had acetylcysteine since his admission to  MICU. Was said to be encephalopathic at OSH but was AOX4 upon arrival to  MICU. Hepatology was consulted and would appreciate their recs.     Updates:  -Will send repeat AMA for confirmation testing - if +ve, hepatology will consider liver biopsy  -Pending morning MELD labs   -mentation improved since yesterday  -continue titration of BM to 3-5 daily with lactulose  -if no indication for liver biopsy inpatient, can safely dc home as early as tomorrow morning      #Acute liver injury/failure, possible acute on chronic liver disease  #HE, resolved   #Coagulopathy, resolved   -Hx of chronic alcohol use disorder  -Patient presents with acute liver injury with INR >9.5 +/- failure given reported encephalopathy with A&Ox2 at OSH. Currently A&Ox4, no asterixis or c/f current encephalopathy though has been treated with lactulose  -Hepatic function panel severely deranged-Alb-1.7,ALP-804,ALT-278,AST-945  -Hepatitis panel, A1AT, Ceruloplasmin,HSV1&2, Acetaminophen level,AFP,Fibrinogen,MICHAEL are all within normal limits  -Ferritin>9000,serum iron-85 WNL  -EBV DNA<35 detected  -IgG-1600  -AMA-29.3 (uln 24.9, >25 is positive), repeat AMA-9/14 is negative  -liver US shows echogenic appearance of the liver compatible with steatosis and normal Doppler interrogation of the hepatic vasculature. There is mild abdominal ascites and a right pleural effusion   -S/p IV Vit K x1 9/11, 1u FFP 9/12 with INR >9.5, now  2.8,aPTT-125.  -had Acetylcysteine in the MICU  -INR-2.8-->1.5-9/14, ALP-804-->693-9/14, AST-945-->401-9/14, ALT-278-->169-9/14.  -MELD-18, MELD-Na-14-9/14  Plan-  -Hepatology following, appreciate recs  -will follow up on pending hepatic work up  -continue with lactulose, titrating to 3-4 BM/day   -Monitor Hgb, Coags, signs of bleed  -Daily MELD labs  [ ] AMA levels sent as add on, if +ve and LFTs worsen, will follow up with hepatology to consider patient for transjugular liver biopsy      #Acute anaemia/concern for hemolysis  -Hgb 12.0 to 9.5   - pale but not jaundiced  -Tbili-2.7, indirect-1.4, LDH-846, Haptoglobin<30  -peripheral blood smear-Mild microcytosis with many target cells and many Pappenheimer bodies. Findings consistent with hemoglobinopathy.   -Hb remains stable   Plan-  -monitor Hb     # Traumatic catheterization  -Difficult catheterization at MICU with bleeding.  -patient accidentally urinated without difficulty  Plan-  -bladder scan to rule out retention of urine if suspected  -urology to pass catheter if there are concerns for retention  - no issues today, no hematuria      #HFpEF  #Tachycardia  -limited ECHO -9/13 shows EF of 78%  -CXR without signs of pulmonary edema  -Tachycardia resolved  -on home metop 25mg bid       DISPO:-if no indication for liver biopsy inpatient, can safely dc home as early as tomorrow morning      FEN  Fluids: PRN  Electrolytes: K>4, Mg>2  Nutrition: Adult regular diet  Prophylaxis:  DVT ppx: chem ppx held iso coagulopathy, SCDs  GI ppx: PPI  Bowel care: lactulose  Hardware:                        Social:  Code: Full Code    HPOA: Mayela (wife) [phone]  Disposition: Floor     Plan is not final until reviewed by Attending-DO Rome Jurado MD  PGY-II

## 2024-09-15 NOTE — PROGRESS NOTES
"[facility]  [facility] [place]  CONSULT FOLLOW-UP     Reason For Consult  Acute liver injury     SUBJECTIVE     Pt reports mild midline abdominal pain present since admission. No other complaints.     EXAM     Last Recorded Vitals  Blood pressure 137/78, pulse 67, temperature 36.4 °C (97.5 °F), temperature source Temporal, resp. rate 17, height 1.753 m (5' 9\"), weight 67.5 kg (148 lb 13 oz), SpO2 100%.      Intake/Output Summary (Last 24 hours) at 9/15/2024 1236  Last data filed at 9/15/2024 0546  Gross per 24 hour   Intake 407 ml   Output 100 ml   Net 307 ml       Physical Exam   General: chronically ill, no acute distress  HEENT: PERRLA, EOM intact, no scleral icterus, poor dentition   Respiratory: normal work of breathing  Cardiovascular: Tachycardic  Abdomen: Soft, nontender, nondistended  Extremities: no edema, no asterixis  Neuro: alert and oriented, moves all 4 extremities with no focal deficits      OBJECTIVE                                                                              Medications             Current Facility-Administered Medications:     pantoprazole (ProtoNix) EC tablet 40 mg, 40 mg, oral, Daily before breakfast, 40 mg at 09/15/24 0514 **OR** esomeprazole (NexIUM) suspension 40 mg, 40 mg, nasoduodenal tube, Daily before breakfast **OR** pantoprazole (ProtoNix) injection 40 mg, 40 mg, intravenous, Daily before breakfast, Ortiz Julian MD    HYDROmorphone (Dilaudid) injection 0.2 mg, 0.2 mg, intravenous, q4h PRN, Ortiz Julian MD, 0.2 mg at 09/13/24 1249    lactulose 20 gram/30 mL oral solution 20 g, 20 g, oral, TID, Ortiz Julian MD, 20 g at 09/14/24 2047    levothyroxine (Synthroid, Levoxyl) tablet 125 mcg, 125 mcg, oral, Daily, Ortiz Julian MD, 125 mcg at 09/15/24 0513    lidocaine 4 % patch 1 patch, 1 patch, transdermal, Daily, Ortiz Julian MD, 1 patch at 09/14/24 0841    melatonin tablet 6 mg, 6 mg, oral, Daily, Ortiz " "PRECIOUS Julian MD, 6 mg at 09/14/24 1857    metoprolol tartrate (Lopressor) tablet 25 mg, 25 mg, oral, BID, Ortiz Julian MD, 25 mg at 09/15/24 0907                                                                            Labs     CBC RFP   Lab Results   Component Value Date    WBC 7.0 09/14/2024    HGB 9.6 (L) 09/14/2024    HCT 29.7 (L) 09/14/2024    MCV 88 09/14/2024     (L) 09/14/2024    NEUTROABS 5.22 09/14/2024    Lab Results   Component Value Date     09/15/2024    K 3.2 (L) 09/15/2024     09/15/2024    CO2 25 09/15/2024    BUN 7 09/15/2024    CREATININE 1.02 09/15/2024     Lab Results   Component Value Date    MG 1.90 09/15/2024    PHOS 3.0 09/15/2024    CALCIUM 6.8 (L) 09/15/2024    ALBUMIN 1.6 (L) 09/15/2024         Hepatic Function ABG/VBG   Lab Results   Component Value Date     (H) 09/15/2024     (H) 09/15/2024    ALKPHOS 637 (H) 09/15/2024     Lab Results   Component Value Date    BILIDIR 1.3 (H) 09/15/2024      Lab Results   Component Value Date    PROTIME 14.6 (H) 09/15/2024    APTT 35 09/15/2024    INR 1.3 (H) 09/15/2024    No results found for: \"NONUHFIRE\", \"LACTATE\"                                                                           Imaging     9/11/24 US Liver w/Dopplers  FINDINGS:     Color and spectral Doppler evaluation of the hepatic vessels and portal circulation demonstrates normal direction and amplitude of blood flow within the left, right, and main portal veins, as well as within the left, right, and middle hepatic veins.  Normal flow is also present within the splenic vein, main hepatic artery, and inferior vena cava.     There is perihepatic ascites and a right-sided pleural effusion. The gallbladder contains sludge. The liver parenchyma is diffusely echogenic suggesting diffuse liver disease such as steatosis.         8/12/24 CT AP w/IV Contrast   IMPRESSION:   Rectal wall thickening and mucosal hyperenhancement and perirectal fat   stranding. "  Findings suspicious for acute proctitis.  There also appear   to be internal rectal hemorrhoids present.     New small volume of ascites in the abdomen and pelvis, nonspecific.     Liver is diffusely steatotic.     Sequela of chronic pancreatitis redemonstrated with unchanged pancreatic   ductal dilatation.  No acute peripancreatic inflammation.      12/18/2020 US Abdomen   The liver is normal in echotexture.  No hyper or hypo echoic masses   are seen.  There is no intrahepatic biliary ductal dilatation.      1. Pancreatic calcifications with dilated pancreatic duct suggesting   changes of chronic pancreatitis.     2. 2.9 cm nodular area right suprarenal location could represent an   enlarged adrenal gland or lymph node --- this could be further   evaluated with CT as clinically dictated.     3. Tiny left renal medullary calcification possibly calculus without   hydronephrosis. 9 mm right renal cortical cyst.                                                                          GI Procedures     None    ASSESSMENT / PLAN     ASSESSMENT/PLAN:  Manuel Bowser is a 63 y.o. male with a past medical history of HFpEF, DM, chronic pancreatitis, HTN, DLD, AUD admitted on 9/13/2024 as transfer from [facility] ICU for workup of abnormal LFTs . Hepatology is consulted for Acute liver injury.      His LFTs on presentation to [facility] were cholestatic in nature with elevated INR to 2.8 on 9/10 however appears that something acutely occurred from 9/10 to 9/11 as patients LFTs increased significantly and INR >9.5 his bilirubin appears to be largely indirect at this point. Etiology of his abnormal LFTs is unclear at this point, he very likely at baseline has alcohol associated liver disease, his patter of injury at this point is mixed and does not fit with alcohol hepatitis. He has not had any recent antibiotics or medications (reviewed previous admission medications from 8/2024) that would suggest DILI. His pattern is atypical and,  given his INR >9.5, Ferritin >9000 and labs consistent with hemolysis this may be related to hematologic or rheumatologic etiology in origin. At this time he does not appear to have liver failure with no asterixis or encephalopathy on exam.      - Ultrasound Liver with dopplers         patent vasculature   - Hepatitis A virus IgG and IgM           negative 9/10/24  - Hepatitis B surface antigen               negative 9/10/24  - Hepatitis B core Ab IgM/IgG                        negative 9/10/24  - Hep C antibody                                 negative 9/13/24  - Alpha fetoprotein                             4 9/13/24  - Ceruloplasmin                                   WNL  - Alpha-1 - antitrypsin phenotype      WNL  - HIV                                                    negative 9/11/24  - VDRL                                                 NOT ORDERED   - MICHAEL                                                  Negative  - Anti Smooth Muscle Antibody         PENDING   - IgG                                                    PENDING   - LKM Ab                                             PENDING   - Anti Mitochondrial antibody            Negative   - Iron studies with Ferritin                   Iron 85; ferritin >9000 9/13/24  - HSV                                                   Negative   - EBV                                                    PENDING   - PETH                                                  PENDING      # Elevated liver test not consistent with alcoholic hepatitis  # Alcohol-related liver disease, steatosis  # Ascites      Recommendations:  1. Gamma-glutamyltransferase  2. Peripheral smear to assess for hemolysis (if not done already)  3. Transjugular liver biopsy with interventional radiology (IR consult required, NPO after midnight)  4. During the liver biopsy, hepatic venous pressure gradient measurements should be obtained  5. Follow up on Peth and remaining work up  6. Repeat Ferritin  7.  Consider hematology consult pending hemolysis work up  8. Paracentesis with the following studies:              1. Cell count              2. Fluid culture              3. Ascites protein              4. Ascites albumin              5. Cytology              6. LDH    Patient was seen and discussed with Dr. Ansari .    Thank you for the consultation. Hepatology will continue to follow .  During weekday hours of 7am-5pm, please do not hesitate to contact me on [employer] Chat or page [phone], if there are any further questions.  After hours, on weekends, and on holidays, please page the [place] GI [place] [phone].   Thank you.

## 2024-09-15 NOTE — CARE PLAN
The patient's goals for the shift include      The clinical goals for the shift include   Problem: Fall/Injury  Goal: Not fall by end of shift  Outcome: Progressing  Goal: Be free from injury by end of the shift  Outcome: Progressing  Goal: Verbalize understanding of personal risk factors for fall in the hospital  Outcome: Progressing  Goal: Verbalize understanding of risk factor reduction measures to prevent injury from fall in the home  Outcome: Progressing  Goal: Use assistive devices by end of the shift  Outcome: Progressing  Goal: Pace activities to prevent fatigue by end of the shift  Outcome: Progressing     Problem: Skin  Goal: Decreased wound size/increased tissue granulation at next dressing change  Outcome: Progressing  Flowsheets (Taken 9/15/2024 0757)  Decreased wound size/increased tissue granulation at next dressing change:   Promote sleep for wound healing   Protective dressings over bony prominences  Goal: Participates in plan/prevention/treatment measures  Outcome: Progressing  Flowsheets (Taken 9/15/2024 0757)  Participates in plan/prevention/treatment measures:   Discuss with provider PT/OT consult   Elevate heels   Increase activity/out of bed for meals  Goal: Prevent/manage excess moisture  Outcome: Progressing  Flowsheets (Taken 9/15/2024 0757)  Prevent/manage excess moisture:   Cleanse incontinence/protect with barrier cream   Follow provider orders for dressing changes   Moisturize dry skin  Goal: Prevent/minimize sheer/friction injuries  Outcome: Progressing  Flowsheets (Taken 9/15/2024 0757)  Prevent/minimize sheer/friction injuries:   Increase activity/out of bed for meals   Use pull sheet   HOB 30 degrees or less   Turn/reposition every 2 hours/use positioning/transfer devices  Goal: Promote/optimize nutrition  Outcome: Progressing  Flowsheets (Taken 9/15/2024 0757)  Promote/optimize nutrition:   Offer water/supplements/favorite foods   Consume > 50% meals/supplements  Goal: Promote skin  healing  Outcome: Progressing  Flowsheets (Taken 9/15/2024 0757)  Promote skin healing:   Assess skin/pad under line(s)/device(s)   Turn/reposition every 2 hours/use positioning/transfer devices     Problem: Pain  Goal: Takes deep breaths with improved pain control throughout the shift  Outcome: Progressing  Goal: Turns in bed with improved pain control throughout the shift  Outcome: Progressing  Goal: Walks with improved pain control throughout the shift  Outcome: Progressing  Goal: Performs ADL's with improved pain control throughout shift  Outcome: Progressing  Goal: Participates in PT with improved pain control throughout the shift  Outcome: Progressing  Goal: Free from opioid side effects throughout the shift  Outcome: Progressing  Goal: Free from acute confusion related to pain meds throughout the shift  Outcome: Progressing

## 2024-09-16 ENCOUNTER — APPOINTMENT (OUTPATIENT)
Dept: RADIOLOGY | Facility: HOSPITAL | Age: 64
End: 2024-09-16
Payer: COMMERCIAL

## 2024-09-16 PROBLEM — E43 SEVERE PROTEIN-CALORIE MALNUTRITION (MULTI): Status: ACTIVE | Noted: 2024-09-16

## 2024-09-16 LAB
ALBUMIN FLD-MCNC: <0.5 G/DL
ALBUMIN SERPL BCP-MCNC: 1.6 G/DL (ref 3.4–5)
ALP SERPL-CCNC: 654 U/L (ref 33–136)
ALT SERPL W P-5'-P-CCNC: 114 U/L (ref 10–52)
ANION GAP SERPL CALC-SCNC: 15 MMOL/L (ref 10–20)
APTT PPP: 31 SECONDS (ref 27–38)
AST SERPL W P-5'-P-CCNC: 190 U/L (ref 9–39)
BASOPHILS # BLD AUTO: 0.01 X10*3/UL (ref 0–0.1)
BASOPHILS NFR BLD AUTO: 0.2 %
BASOPHILS NFR FLD MANUAL: 0 %
BILIRUB DIRECT SERPL-MCNC: 1.2 MG/DL (ref 0–0.3)
BILIRUB SERPL-MCNC: 2.3 MG/DL (ref 0–1.2)
BLASTS NFR FLD MANUAL: 0 %
BUN SERPL-MCNC: 7 MG/DL (ref 6–23)
CALCIUM SERPL-MCNC: 7 MG/DL (ref 8.6–10.6)
CHLORIDE SERPL-SCNC: 106 MMOL/L (ref 98–107)
CLARITY FLD: CLEAR
CO2 SERPL-SCNC: 23 MMOL/L (ref 21–32)
COLOR FLD: NORMAL
CREAT SERPL-MCNC: 1.17 MG/DL (ref 0.5–1.3)
EGFRCR SERPLBLD CKD-EPI 2021: 70 ML/MIN/1.73M*2
EOSINOPHIL # BLD AUTO: 0 X10*3/UL (ref 0–0.7)
EOSINOPHIL NFR BLD AUTO: 0 %
EOSINOPHIL NFR FLD MANUAL: 0 %
ERYTHROCYTE [DISTWIDTH] IN BLOOD BY AUTOMATED COUNT: 15.9 % (ref 11.5–14.5)
GGT SERPL-CCNC: 1069 U/L (ref 5–64)
GLUCOSE SERPL-MCNC: 107 MG/DL (ref 74–99)
HCT VFR BLD AUTO: 26.6 % (ref 41–52)
HGB BLD-MCNC: 9 G/DL (ref 13.5–17.5)
IMM GRANULOCYTES # BLD AUTO: 0.04 X10*3/UL (ref 0–0.7)
IMM GRANULOCYTES NFR BLD AUTO: 0.6 % (ref 0–0.9)
IMMATURE GRANULOCYTES IN FLUID: 0 %
INR PPP: 1.2 (ref 0.9–1.1)
LDH FLD L TO P-CCNC: 43 U/L
LYMPHOCYTES # BLD AUTO: 1.44 X10*3/UL (ref 1.2–4.8)
LYMPHOCYTES NFR BLD AUTO: 22.5 %
LYMPHOCYTES NFR FLD MANUAL: 39 %
MAGNESIUM SERPL-MCNC: 2 MG/DL (ref 1.6–2.4)
MCH RBC QN AUTO: 28.8 PG (ref 26–34)
MCHC RBC AUTO-ENTMCNC: 33.8 G/DL (ref 32–36)
MCV RBC AUTO: 85 FL (ref 80–100)
MONOCYTES # BLD AUTO: 0.71 X10*3/UL (ref 0.1–1)
MONOCYTES NFR BLD AUTO: 11.1 %
MONOS+MACROS NFR FLD MANUAL: 56 %
NEUTROPHILS # BLD AUTO: 4.21 X10*3/UL (ref 1.2–7.7)
NEUTROPHILS NFR BLD AUTO: 65.6 %
NEUTROPHILS NFR FLD MANUAL: 5 %
NRBC BLD-RTO: 0.3 /100 WBCS (ref 0–0)
OTHER CELLS NFR FLD MANUAL: 0 %
PHOSPHATE SERPL-MCNC: 2.8 MG/DL (ref 2.5–4.9)
PLASMA CELLS NFR FLD MANUAL: 0 %
PLATELET # BLD AUTO: 124 X10*3/UL (ref 150–450)
POTASSIUM SERPL-SCNC: 4.6 MMOL/L (ref 3.5–5.3)
PROT FLD-MCNC: <0.5 G/DL
PROT SERPL-MCNC: 4.7 G/DL (ref 6.4–8.2)
PROTHROMBIN TIME: 13.4 SECONDS (ref 9.8–12.8)
RBC # BLD AUTO: 3.12 X10*6/UL (ref 4.5–5.9)
RBC # FLD AUTO: 11 /UL
SMOOTH MUSCLE AB SER QL IF: ABNORMAL
SODIUM SERPL-SCNC: 139 MMOL/L (ref 136–145)
TOTAL CELLS COUNTED FLD: 100
WBC # BLD AUTO: 6.4 X10*3/UL (ref 4.4–11.3)
WBC # FLD AUTO: 10 /UL

## 2024-09-16 PROCEDURE — 2500000001 HC RX 250 WO HCPCS SELF ADMINISTERED DRUGS (ALT 637 FOR MEDICARE OP)

## 2024-09-16 PROCEDURE — 84075 ASSAY ALKALINE PHOSPHATASE: CPT

## 2024-09-16 PROCEDURE — 99153 MOD SED SAME PHYS/QHP EA: CPT | Mod: RIGHT SIDE | Performed by: RADIOLOGY

## 2024-09-16 PROCEDURE — 99232 SBSQ HOSP IP/OBS MODERATE 35: CPT

## 2024-09-16 PROCEDURE — 7100000009 HC PHASE TWO TIME - INITIAL BASE CHARGE

## 2024-09-16 PROCEDURE — 7100000010 HC PHASE TWO TIME - EACH INCREMENTAL 1 MINUTE

## 2024-09-16 PROCEDURE — 99152 MOD SED SAME PHYS/QHP 5/>YRS: CPT

## 2024-09-16 PROCEDURE — 85025 COMPLETE CBC W/AUTO DIFF WBC: CPT

## 2024-09-16 PROCEDURE — 36012 PLACE CATHETER IN VEIN: CPT | Mod: RIGHT SIDE | Performed by: RADIOLOGY

## 2024-09-16 PROCEDURE — 83615 LACTATE (LD) (LDH) ENZYME: CPT | Performed by: STUDENT IN AN ORGANIZED HEALTH CARE EDUCATION/TRAINING PROGRAM

## 2024-09-16 PROCEDURE — 36415 COLL VENOUS BLD VENIPUNCTURE: CPT

## 2024-09-16 PROCEDURE — 49083 ABD PARACENTESIS W/IMAGING: CPT | Performed by: RADIOLOGY

## 2024-09-16 PROCEDURE — 88112 CYTOPATH CELL ENHANCE TECH: CPT | Performed by: PATHOLOGY

## 2024-09-16 PROCEDURE — 75970 VASCULAR BIOPSY: CPT | Mod: RIGHT SIDE | Performed by: RADIOLOGY

## 2024-09-16 PROCEDURE — 88307 TISSUE EXAM BY PATHOLOGIST: CPT | Performed by: PATHOLOGY

## 2024-09-16 PROCEDURE — 99153 MOD SED SAME PHYS/QHP EA: CPT

## 2024-09-16 PROCEDURE — 83735 ASSAY OF MAGNESIUM: CPT

## 2024-09-16 PROCEDURE — 2500000005 HC RX 250 GENERAL PHARMACY W/O HCPCS

## 2024-09-16 PROCEDURE — 2550000001 HC RX 255 CONTRASTS: Performed by: RADIOLOGY

## 2024-09-16 PROCEDURE — 84157 ASSAY OF PROTEIN OTHER: CPT

## 2024-09-16 PROCEDURE — 49083 ABD PARACENTESIS W/IMAGING: CPT | Mod: RIGHT SIDE | Performed by: RADIOLOGY

## 2024-09-16 PROCEDURE — 87205 SMEAR GRAM STAIN: CPT | Performed by: STUDENT IN AN ORGANIZED HEALTH CARE EDUCATION/TRAINING PROGRAM

## 2024-09-16 PROCEDURE — 75889 VEIN X-RAY LIVER W/HEMODYNAM: CPT | Mod: RIGHT SIDE | Performed by: RADIOLOGY

## 2024-09-16 PROCEDURE — C1729 CATH, DRAINAGE: HCPCS

## 2024-09-16 PROCEDURE — 82977 ASSAY OF GGT: CPT

## 2024-09-16 PROCEDURE — 88112 CYTOPATH CELL ENHANCE TECH: CPT | Mod: TC,MCY | Performed by: STUDENT IN AN ORGANIZED HEALTH CARE EDUCATION/TRAINING PROGRAM

## 2024-09-16 PROCEDURE — 77001 FLUOROGUIDE FOR VEIN DEVICE: CPT | Performed by: RADIOLOGY

## 2024-09-16 PROCEDURE — 37200 TRANSCATHETER BIOPSY: CPT | Mod: RIGHT SIDE | Performed by: RADIOLOGY

## 2024-09-16 PROCEDURE — 2500000004 HC RX 250 GENERAL PHARMACY W/ HCPCS (ALT 636 FOR OP/ED)

## 2024-09-16 PROCEDURE — 85610 PROTHROMBIN TIME: CPT

## 2024-09-16 PROCEDURE — C1769 GUIDE WIRE: HCPCS

## 2024-09-16 PROCEDURE — 0W9G3ZZ DRAINAGE OF PERITONEAL CAVITY, PERCUTANEOUS APPROACH: ICD-10-PCS | Performed by: RADIOLOGY

## 2024-09-16 PROCEDURE — 1100000001 HC PRIVATE ROOM DAILY

## 2024-09-16 PROCEDURE — 84100 ASSAY OF PHOSPHORUS: CPT

## 2024-09-16 PROCEDURE — 89051 BODY FLUID CELL COUNT: CPT

## 2024-09-16 PROCEDURE — 99152 MOD SED SAME PHYS/QHP 5/>YRS: CPT | Mod: RIGHT SIDE | Performed by: RADIOLOGY

## 2024-09-16 PROCEDURE — 0FB13ZX EXCISION OF RIGHT LOBE LIVER, PERCUTANEOUS APPROACH, DIAGNOSTIC: ICD-10-PCS | Performed by: RADIOLOGY

## 2024-09-16 PROCEDURE — 82042 OTHER SOURCE ALBUMIN QUAN EA: CPT

## 2024-09-16 PROCEDURE — 47000 NEEDLE BIOPSY OF LIVER PERQ: CPT | Performed by: RADIOLOGY

## 2024-09-16 PROCEDURE — 2500000004 HC RX 250 GENERAL PHARMACY W/ HCPCS (ALT 636 FOR OP/ED): Performed by: RADIOLOGY

## 2024-09-16 PROCEDURE — 2720000007 HC OR 272 NO HCPCS

## 2024-09-16 PROCEDURE — 88305 TISSUE EXAM BY PATHOLOGIST: CPT | Performed by: PATHOLOGY

## 2024-09-16 PROCEDURE — 36010 PLACE CATHETER IN VEIN: CPT | Performed by: RADIOLOGY

## 2024-09-16 PROCEDURE — 88313 SPECIAL STAINS GROUP 2: CPT | Mod: TC,SUR | Performed by: STUDENT IN AN ORGANIZED HEALTH CARE EDUCATION/TRAINING PROGRAM

## 2024-09-16 PROCEDURE — 88313 SPECIAL STAINS GROUP 2: CPT | Performed by: PATHOLOGY

## 2024-09-16 RX ORDER — MIDAZOLAM HYDROCHLORIDE 1 MG/ML
INJECTION INTRAMUSCULAR; INTRAVENOUS
Status: COMPLETED | OUTPATIENT
Start: 2024-09-16 | End: 2024-09-16

## 2024-09-16 RX ORDER — FENTANYL CITRATE 50 UG/ML
INJECTION, SOLUTION INTRAMUSCULAR; INTRAVENOUS
Status: COMPLETED | OUTPATIENT
Start: 2024-09-16 | End: 2024-09-16

## 2024-09-16 ASSESSMENT — COGNITIVE AND FUNCTIONAL STATUS - GENERAL
DRESSING REGULAR UPPER BODY CLOTHING: A LITTLE
DRESSING REGULAR LOWER BODY CLOTHING: A LITTLE
WALKING IN HOSPITAL ROOM: A LOT
MOVING FROM LYING ON BACK TO SITTING ON SIDE OF FLAT BED WITH BEDRAILS: A LITTLE
HELP NEEDED FOR BATHING: A LITTLE
TOILETING: A LOT
STANDING UP FROM CHAIR USING ARMS: A LITTLE
TURNING FROM BACK TO SIDE WHILE IN FLAT BAD: A LITTLE
DAILY ACTIVITIY SCORE: 19
CLIMB 3 TO 5 STEPS WITH RAILING: A LOT
MOBILITY SCORE: 16
MOVING TO AND FROM BED TO CHAIR: A LITTLE

## 2024-09-16 ASSESSMENT — PAIN SCALES - GENERAL
PAINLEVEL_OUTOF10: 8
PAINLEVEL_OUTOF10: 7
PAINLEVEL_OUTOF10: 0 - NO PAIN
PAINLEVEL_OUTOF10: 8
PAINLEVEL_OUTOF10: 8
PAINLEVEL_OUTOF10: 0 - NO PAIN
PAINLEVEL_OUTOF10: 7
PAINLEVEL_OUTOF10: 0 - NO PAIN

## 2024-09-16 ASSESSMENT — PAIN - FUNCTIONAL ASSESSMENT
PAIN_FUNCTIONAL_ASSESSMENT: 0-10
PAIN_FUNCTIONAL_ASSESSMENT: 0-10

## 2024-09-16 ASSESSMENT — PAIN DESCRIPTION - DESCRIPTORS: DESCRIPTORS: DISCOMFORT

## 2024-09-16 NOTE — POST-PROCEDURE NOTE
Interventional Radiology Brief Postprocedure Note    Attending: Dr Param Rodriges    Assistant: Dr Pardeep Thompson    Diagnosis: Acutely elevated liver function tests with ascites    Description of procedure: Successful and uneventful transjugular liver biopsy (2x core biopsy). Free hepatic pressure: 2mmHg, wedge hepatic pressure 9mmHg, right atrial pressure 1 mmHg.  Concurrent paracentesis was done with 600cc of straw yellow ascitic fluid obtained. Sent for labs.    Anesthesia:  MAC    Complications: None    Estimated Blood Loss: none    Medications  As of 09/16/24 1530      pantoprazole (ProtoNix) EC tablet 40 mg (mg) Total dose:  120 mg Dosing weight:  73.1      Date/Time Rate/Dose/Volume Action       09/13/24 0600 40 mg Given      1844 *Not included in total MAR Hold      1847 *Not included in total MAR Unhold     09/14/24  0840 40 mg Given     09/15/24  0514 40 mg Given     09/16/24  0517 *Not included in total See Alternative               esomeprazole (NexIUM) suspension 40 mg (mg) Total dose:  Cannot be calculated* Dosing weight:  73.1   *Administration dose not documented     Date/Time Rate/Dose/Volume Action       09/13/24 0600 *Not included in total See Alternative      1844 *Not included in total MAR Hold      1847 *Not included in total MAR Unhold     09/14/24  0840 *Not included in total See Alternative     09/15/24  0514 *Not included in total See Alternative     09/16/24  0517 *Not included in total See Alternative               pantoprazole (ProtoNix) injection 40 mg (mg) Total dose:  40 mg Dosing weight:  73.1      Date/Time Rate/Dose/Volume Action       09/13/24 0600 *Not included in total See Alternative      1844 *Not included in total MAR Hold      1847 *Not included in total MAR Unhold     09/14/24  0840 *Not included in total See Alternative     09/15/24  0514 *Not included in total See Alternative     09/16/24  0517 40 mg Given               lactulose 20 gram/30 mL oral solution 20 g  (g) Total dose:  120 g* Dosing weight:  73.1   *Administration not included in total     Date/Time Rate/Dose/Volume Action       09/13/24  0859 *20 g Missed      1432 *20 g Missed      1844 *Not included in total MAR Hold      1847 *Not included in total MAR Unhold      2302 20 g Given     09/14/24  0841 20 g Given      1525 20 g Given      2047 20 g Given     09/15/24  0907 *20 g Missed      1432 *20 g Missed      2132 20 g Given     09/16/24  0853 20 g Given               levothyroxine (Synthroid, Levoxyl) tablet 125 mcg (mcg) Total volume:  Not documented* Dosing weight:  73.1   *Total volume has not been documented. View each administration to see the amount administered.     Date/Time Rate/Dose/Volume Action       09/13/24  0537 125 mcg Given      1844 *Not included in total MAR Hold      1847 *Not included in total MAR Unhold     09/14/24  0647 125 mcg Given     09/15/24  0513 125 mcg Given     09/16/24  0517 125 mcg Given               melatonin tablet 6 mg (mg) Total dose:  18 mg Dosing weight:  73.1      Date/Time Rate/Dose/Volume Action       09/13/24  1844 *Not included in total MAR Hold      1847 *Not included in total MAR Unhold      2302 6 mg Given     09/14/24  1857 6 mg Given     09/15/24  1744 6 mg Given               acetylcysteine (Acetadote) 10.2 g in dextrose 5% 200 mL infusion (mL/hr) Total dose:  10,200 mg* Dosing weight:  67.5   *From user-documented volume     Date/Time Rate/Dose/Volume Action       09/13/24  0844 10.2 g - 200 mL/hr (over 60 min) New Bag      1002  (over 60 min) Stopped      1100 251 mL [vol]                acetylcysteine (Acetadote) 3.4 g in dextrose 5% 500 mL infusion (g) Total dose:  3,400 mg* Dosing weight:  67.5   *From user-documented volume     Date/Time Rate/Dose/Volume Action       09/13/24  1001 3.4 g (over 240 min) New Bag      1200 517 mL       1424  (over 240 min) Stopped               acetylcysteine (Acetadote) 6.8 g in dextrose 5% 1,000 mL infusion (g) Total  dose:  6,800 mg* Dosing weight:  67.5   *From user-documented volume     Date/Time Rate/Dose/Volume Action       09/13/24  1424 6.8 g (over 960 min) - 1,034 mL New Bag     09/14/24  0646  (over 960 min) Stopped               sodium chloride 0.9 % bolus 500 mL (mL) Total volume:  0 mL* Dosing weight:  67.5   *Administration not included in total     Date/Time Rate/Dose/Volume Action       09/13/24  0922 *500 mL - 500 mL/hr (over 60 min) Missed               perflutren lipid microspheres (Definity) injection 0.5-10 mL of dilution (mL of dilution) Total dose:  2 mL of dilution Dosing weight:  67.5      Date/Time Rate/Dose/Volume Action       09/13/24  1248 2 mL of dilution Given               metoprolol tartrate (Lopressor) tablet 25 mg (mg) Total dose:  175 mg Dosing weight:  67.5      Date/Time Rate/Dose/Volume Action       09/13/24  1026 25 mg Given      1844 *Not included in total MAR Hold      1847 *Not included in total MAR Unhold      2302 25 mg Given     09/14/24  0841 25 mg Given      2047 25 mg Given     09/15/24  0907 25 mg Given      2132 25 mg Given     09/16/24  0853 25 mg Given               ibuprofen tablet 800 mg (mg) Total dose:  800 mg Dosing weight:  67.5      Date/Time Rate/Dose/Volume Action       09/13/24  1248 800 mg Given               aspirin chewable tablet 81 mg (mg) Total dose:  81 mg Dosing weight:  67.5      Date/Time Rate/Dose/Volume Action       09/13/24  1248 81 mg Given               HYDROmorphone (Dilaudid) injection 0.2 mg (mg) Total dose:  0.6 mg Dosing weight:  67.5      Date/Time Rate/Dose/Volume Action       09/13/24  1249 0.2 mg Given      1844 *Not included in total MAR Hold      1847 *Not included in total MAR Unhold     09/15/24  2325 0.2 mg Given     09/16/24  0528 0.2 mg Given               HYDROmorphone (Dilaudid) injection  - Omnicell Override Pull (mg) Total dose:  0.2 mg      Date/Time Rate/Dose/Volume Action       09/13/24  1249 0.2 mg Given               lidocaine 4  % patch 1 patch (patch) Total dose:  3 patch* Dosing weight:  67.5   *Administration not included in total     Date/Time Rate/Dose/Volume Action       09/13/24  1657 1 patch (over 720 min) Medication Applied      1844 *Not included in total MAR Hold      1847 *Not included in total MAR Unhold     09/14/24  0359  (over 720 min) Medication Removed      0841 1 patch (over 720 min) Medication Applied      2039  (over 720 min) Medication Removed     09/15/24  0907 *1 patch (over 720 min) Missed     09/16/24  0853 1 patch (over 720 min) Medication Applied               potassium chloride 20 mEq in sterile water for injection 100 mL (mL/hr) Total dose:  20 mEq* Dosing weight:  67.4   *From user-documented volume     Date/Time Rate/Dose/Volume Action       09/14/24  1751 20 mEq - 50 mL/hr (over 120 min) New Bag      2004 100 mL Stopped      2234 20 mEq - 50 mL/hr (over 120 min) New Bag      2339  (over 120 min) Stopped               potassium chloride 20 mEq in sterile water for injection 100 mL (mL/hr) Total volume:  Not documented* Dosing weight:  67.5   *Total volume has not been documented. View each administration to see the amount administered.     Date/Time Rate/Dose/Volume Action       09/15/24  1725 20 mEq - 50 mL/hr (over 120 min) New Bag      1949  (over 120 min) Stopped               magnesium sulfate 2 g in sterile water for injection 50 mL (mL/hr) Total dose:  2 g* Dosing weight:  67.4   *From user-documented volume     Date/Time Rate/Dose/Volume Action       09/14/24  1747 2 g - 25 mL/hr (over 120 min) New Bag      2004  (over 120 min) Stopped               potassium phosphates 21 mmol in dextrose 5% 250 mL IV (mL/hr) Total dose:  21 mmol* Dosing weight:  67.4   *From user-documented volume     Date/Time Rate/Dose/Volume Action       09/15/24  0019 21 mmol - 42.8 mL/hr (over 360 min) New Bag      0521  (over 360 min) Stopped      0546 257 mL [vol]                potassium chloride CR (Klor-Con M20) ER tablet  40 mEq (mEq) Total dose:  40 mEq Dosing weight:  67.5      Date/Time Rate/Dose/Volume Action       09/15/24  1725 40 mEq Given               midazolam (Versed) injection (mg) Total dose:  2 mg      Date/Time Rate/Dose/Volume Action       09/16/24  1445 1 mg Given      1456 1 mg Given               fentaNYL PF (Sublimaze) injection (mcg) Total dose:  100 mcg      Date/Time Rate/Dose/Volume Action       09/16/24  1445 50 mcg Given      1456 50 mcg Given                   No specimens collected      See detailed result report with images in PACS.    The patient tolerated the procedure well without incident or complication and is in stable condition.

## 2024-09-16 NOTE — PRE-PROCEDURE NOTE
Interventional Radiology Preprocedure Note    Indication for procedure: The primary encounter diagnosis was Alcoholic liver failure (Multi). A diagnosis of Encounter for screening for cardiovascular disorders was also pertinent to this visit.    Relevant review of systems: NA    Relevant Labs:   Lab Results   Component Value Date    CREATININE 1.17 09/16/2024    EGFR 70 09/16/2024    INR 1.2 (H) 09/16/2024    PROTIME 13.4 (H) 09/16/2024       Planned Sedation/Anesthesia: Moderate    Airway assessment: normal    Directed physical examination:    NAD  Breathing comfortably on room air  AAOx4 however slow to respond to questions  Answeing questions appropriately    Mallampati: II (hard and soft palate, upper portion of tonsils and uvula visible)    ASA Score: ASA 3 - Patient with moderate systemic disease with functional limitations    Benefits, risks and alternatives of procedure and planned sedation have been discussed with the patient and/or their representative. All questions answered and they agree to proceed.

## 2024-09-16 NOTE — PROGRESS NOTES
Manuel Bowser is a 63 y.o. male on day 3 of admission presenting with Alcoholic liver failure (Multi).      Subjective   Patient was seen. He's is doing well. A&Ox4. Had 3 BM yesterday. No active complaints.  No acute events overnight.      Objective     Last Recorded Vitals  /88   Pulse 67   Temp 35.7 °C (96.3 °F)   Resp 16   Wt 67.5 kg (148 lb 13 oz)   SpO2 97%   Intake/Output last 3 Shifts:    Intake/Output Summary (Last 24 hours) at 9/16/2024 0756  Last data filed at 9/15/2024 1846  Gross per 24 hour   Intake --   Output 100 ml   Net -100 ml       Admission Weight  Weight: 67.5 kg (148 lb 13 oz) (09/13/24 0100)    Daily Weight  09/15/24 : 67.5 kg (148 lb 13 oz)    Image Results  US liver with doppler  Narrative: Interpreted By:  Manuel Rowley  and Javier Li   STUDY:  US LIVER WITH DOPPLER;  9/13/2024 3:58 pm      INDICATION:  Signs/Symptoms:Liver failure vs liver injury.          COMPARISON:  None.      ACCESSION NUMBER(S):  [id number]      ORDERING CLINICIAN:  JONO ESTRADA      TECHNIQUE:  Multiple images of the right upper quadrant were obtained.  Gray  scale, color Doppler and spectral Doppler waveform analysis was  performed.  This examination was interpreted at [facility].      FINDINGS:  LIVER:  The liver measures 14.7 cm and is diffusely echogenic in appearance,  consistent with diffuse fatty infiltration. The resulting increased  beam attenuation thereby limiting evaluation of the liver for focal  lesions. Within the limitations, no focal lesions are seen.      GALLBLADDER:  The gallbladder is nondistended. Mild smooth thickening of the  gallbladder wall measuring 0.4 cm.      Echogenic sludge within the gallbladder lumen. No discrete  cholelithiasis.      Sonographic Parks's sign is negative.      BILIARY SYSTEM:  No evidence of intra or extrahepatic biliary dilatation is  identified; the common bile duct measures 0.3 cm.       DOPPLER EVALUATION:      HEPATIC ARTERIES:  Hepatic artery and its right and left branches RI's are estimated at  0.8, .82 and 0.6, respectively.      PORTAL VEIN:  Portal vein is patent and measures 1.2 cm. There is normal  respiratory variation. Portal vein velocities are calculated as  follows: main portal vein 11.6 cm/s, antegrade flow; left portal vein  branch 10.4 cm/s, antegrade flow; right portal vein anterior branch  19.9, antegrade flow; right portal vein posterior branch 14.8,  antegrade flow. The splenic vein is also patent.      HEPATIC VEIN:  The right, middle and left hepatic veins are patent and demonstrate  triphasic antegrade flow. IVC appears also patent.      PANCREAS:  The pancreas is poorly visualized due to overlying bowel gas.      RIGHT KIDNEY:  The right kidney measures 9.8 cm in length. No hydronephrosis or  renal calculi are seen.      There is a cyst of the right interpolar region measuring 0.8 x 0.8 x  0.7 cm.      SPLEEN:  The spleen measures 6.6 cm and is grossly unremarkable.      PERITONEUM AND RETROPERITONEUM:  There is a small volume of abdominal ascites. Note is made of a right  pleural effusion.      Impression: 1. Biliary sludge with mild diffuse thickening of the gallbladder  wall, likely related to ascites. Otherwise the gallbladder is  nondilated without cholelithiasis or sonographic evidence of acute  cholecystitis.  2. Echogenic appearance of the liver compatible with steatosis.  3. Normal Doppler interrogation of the hepatic vasculature.  4. Mild abdominal ascites and a right pleural effusion.      I personally reviewed the image(s)/study and resident interpretation  as stated by Dr. Jen Herrera MD. I agree with the findings as  stated. This study was interpreted at [facility], [place], [place].      MACRO:  None      Signed by: Manuel Rowley 9/13/2024 4:45 PM  Dictation workstation:   [id number]  Transthoracic Echo (TTE)  Limited     [facility], [address] [place] [address]                 Tel [phone] and Fax [phone]    TRANSTHORACIC ECHOCARDIOGRAM REPORT       Patient Name:      EDVIN INGRAM     Patricia Physician:    [id number] Christal Kellogg MD  Study Date:        9/13/2024            Ordering Provider:    [id number] LIORPRECIOUS MIGNON MYERSNA  MRN/PID:           [id number]             Fellow:  Accession#:        [id number]         Nurse:  Date of Birth/Age: 1960 / 63 years Sonographer:          Joseph White RDCS  Gender:            M                    Additional Staff:  Height:            175.26 cm            Admit Date:  Weight:            67.13 kg             Admission Status:     Inpatient -                                                                Routine  BSA / BMI:         1.82 m2 / 21.86      Encounter#:           [id number]                     kg/m2  Blood Pressure:    109/65 mmHg          Department Location:  [address]    Study Type:    TRANSTHORACIC ECHO (TTE) LIMITED  Diagnosis/ICD: Encounter for screening for cardiovascular disorders-Z13.6  Indication:    acute decompensated liver disease, pleural effusions  CPT Code:      Echo Limited-69172; Doppler Limited-91363; Color Doppler-78987    Patient History:  Pertinent History: HFpEF, DM, HTN, DLD, alcohol use disorder.    Study Detail: The following Echo studies were performed: M-Mode, 2D, Doppler and                color flow. Technically challenging study due to body habitus,                poor acoustic windows, prominent lung artifact and patient lying                in supine position. Definity used as a contrast agent for                endocardial border definition. Total contrast used for this                procedure was 2.0 mL via IV push.       PHYSICIAN INTERPRETATION:  Left Ventricle: Left  ventricular ejection fraction is hyperdynamic, calculated by Franco's biplane at 78%. The left ventricular cavity size is decreased. Left ventricular diastolic filling was indeterminate. Patient with significant tachycardia during the study.  Left Atrium: The left atrium was not well visualized. There is no evidence of a patent foramen ovale. A bubble study using agitated saline was performed.  Right Ventricle: The right ventricle is normal in size. There is normal right ventricular global systolic function.  Right Atrium: The right atrium is normal in size.  Aortic Valve: The aortic valve was not well visualized. There is trace aortic valve regurgitation.  Mitral Valve: The mitral valve is normal in structure. There is trace mitral valve regurgitation.  Tricuspid Valve: The tricuspid valve is structurally normal. There is mild tricuspid regurgitation. The Doppler estimated RVSP is slightly elevated at 34.1 mmHg.  Pulmonic Valve: The pulmonic valve was not assessed. Pulmonic valve regurgitation was not assessed.  Pericardium: There is a moderate pericardial effusion. The pericardial effusion is loculated. Seen anteriorly to the right ventricle.  Aorta: The aortic root is normal.  Systemic Veins: The inferior vena cava size appears small, IVC inspiratory collapse greater than 50%.  In comparison to the previous echocardiogram(s): There are no prior studies on this patient for comparison purposes.       CONCLUSIONS:   1. Poorly visualized anatomical structures due to suboptimal image quality.   2. Left ventricular ejection fraction is hyperdynamic, calculated by Franco's biplane at 78%.   3. Left ventricular diastolic filling was indeterminate.   4. Left ventricular cavity size is decreased.   5. There is normal right ventricular global systolic function.   6. There is a moderate pericardial effusion seen anterior to the right ventricle and containining loculated material   7. Slightly elevated right ventricular  systolic pressure.   8. There is no evidence of a patent foramen ovale.    QUANTITATIVE DATA SUMMARY:     2D MEASUREMENTS:          Normal Ranges:  IVSd:            1.00 cm  (0.6-1.1cm)  LVPWd:           0.80 cm  (0.6-1.1cm)  LVIDd:           3.30 cm  (3.9-5.9cm)  LVIDs:           2.70 cm  LV Mass Index:   45 g/m2  LVEDV Index:     34 ml/m2  LV % FS          18.2 %       LV SYSTOLIC FUNCTION BY 2D PLANIMETRY (MOD):                       Normal Ranges:  EF-A4C View:    83 % (>=55%)  EF-A2C View:    73 %  EF-Biplane:     78 %  LV EF Reported: 78 %       RIGHT VENTRICLE:  RV s' 0.20 m/s       TRICUSPID VALVE/RVSP:          Normal Ranges:  Peak TR Velocity:     2.79 m/s  RV Syst Pressure:     34 mmHg  (< 30mmHg)       [id number] Christal Kellogg MD  Electronically signed on 9/13/2024 at 1:14:31 PM       ** Final **  XR chest 1 view  Narrative: Interpreted By:  Cr Mendez and Hofer Lindsay   STUDY:  XR CHEST 1 VIEW;  9/13/2024 2:47 am      INDICATION:  Signs/Symptoms:b/l pleural effusion.      COMPARISON:  Chest radiograph 09/08/2024      ACCESSION NUMBER(S):  [id number]      ORDERING CLINICIAN:  MAYRA HAYWOOD      FINDINGS:  AP radiograph of the chest was provided.          CARDIOMEDIASTINAL SILHOUETTE:  Cardiomediastinal silhouette is normal in size and configuration.      LUNGS:  Hazy opacity within the left lower lung. Aeration of the left  costophrenic angle is obscured by overlying medical device. Subtle  hazy opacity in the right lower lung. The right costophrenic angle is  clear. No evidence of pneumothorax.      ABDOMEN:  No remarkable upper abdominal findings.      BONES:  No acute osseous changes.      Impression: 1.  Hazy opacity within the left lower lung, which could represent a  small left-sided pleural effusion versus atelectasis. Correlate with  fluid status.  2. Subtle hazy opacity within the right lower lung, which could  represent small pleural effusion versus atelectasis versus  combination.      I  personally reviewed the images/study and resident's interpretation  and I agree with the findings as stated by Hayley Gonzalez MD (resident  radiologist). This study was analyzed and interpreted at [facility], [place], [place].      MACRO:  None      Signed by: Cr Mendez 9/13/2024 9:17 AM  Dictation workstation:   [id number]      Physical Exam  Physical Exam  Constitutional:       General: He is not in acute distress.     Appearance: He is not diaphoretic.   HENT:      Head: Normocephalic.      Mouth/Throat:      Mouth: Mucous membranes are moist.   Cardiovascular:      Rate and Rhythm: Normal rate and regular rhythm.      Heart sounds: No murmur heard.  Pulmonary:      Effort: No respiratory distress.      Breath sounds: No rhonchi or rales.   Abdominal:      General: slightly distended abdomen       Palpations: There is no mass.      Tenderness: There is no abdominal tenderness. There is no guarding or rebound.   Musculoskeletal:         General: No swelling. No asterixis.      Right lower leg: No edema.      Left lower leg: No edema.   Skin:     Capillary Refill: Capillary refill takes less than 2 seconds.      Coloration: Skin is pale. Skin is not jaundiced.      Findings: No bruising.   Neurological:      General: No focal deficit present.      Mental Status: He is alert and oriented to person, place, and time.        Relevant Results  Results for orders placed or performed during the hospital encounter of 09/13/24 (from the past 24 hour(s))   Coagulation Screen   Result Value Ref Range    Protime 14.6 (H) 9.8 - 12.8 seconds    INR 1.3 (H) 0.9 - 1.1    aPTT 35 27 - 38 seconds   Magnesium   Result Value Ref Range    Magnesium 1.90 1.60 - 2.40 mg/dL   Hepatic function panel   Result Value Ref Range    Albumin 1.6 (L) 3.4 - 5.0 g/dL    Bilirubin, Total 2.3 (H) 0.0 - 1.2 mg/dL    Bilirubin, Direct 1.3 (H) 0.0 - 0.3 mg/dL    Alkaline Phosphatase 637 (H) 33 - 136 U/L    ALT  127 (H) 10 - 52 U/L     (H) 9 - 39 U/L    Total Protein 4.6 (L) 6.4 - 8.2 g/dL   Phosphorus   Result Value Ref Range    Phosphorus 3.0 2.5 - 4.9 mg/dL   Basic Metabolic Panel   Result Value Ref Range    Glucose 157 (H) 74 - 99 mg/dL    Sodium 138 136 - 145 mmol/L    Potassium 3.2 (L) 3.5 - 5.3 mmol/L    Chloride 104 98 - 107 mmol/L    Bicarbonate 25 21 - 32 mmol/L    Anion Gap 12 10 - 20 mmol/L    Urea Nitrogen 7 6 - 23 mg/dL    Creatinine 1.02 0.50 - 1.30 mg/dL    eGFR 83 >60 mL/min/1.73m*2    Calcium 6.8 (L) 8.6 - 10.6 mg/dL   Ferritin   Result Value Ref Range    Ferritin 3,157 (H) 20 - 300 ng/mL   Lactate dehydrogenase   Result Value Ref Range     (H) 84 - 246 U/L        Assessment/Plan      Assessment & Plan  Alcoholic liver failure (Multi)    Acute on chronic alcoholic liver disease (Multi)    Hepatic encephalopathy (Multi)    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 He was started on Lactulose from OSH and had acetylcysteine since his admission to  MICU. Was said to be encephalopathic at OSH but was AOX4 upon arrival to  MICU. Hepatology was consulted and would appreciate their recs.       Update 9/16/2014:  - A&Ox4  - Hepatology onboard, scheduled for transjugular liver biopsy and paracentesis today by IR. consult and orders placed.   - Pending morning MELD labs.  - Continue titration of BM to 3-5 daily with lactulose          #Acute liver injury/failure, possible acute on chronic liver disease  #HE, resolved   #Coagulopathy, resolved   -Hx of chronic alcohol use disorder  -Patient presents with acute liver injury with INR >9.5 +/- failure given reported encephalopathy with A&Ox2 at OSH. Currently  A&Ox4, no asterixis or c/f current encephalopathy though has been treated with lactulose  -Hepatic function panel severely deranged-Alb-1.7,ALP-804,ALT-278,AST-945  -Hepatitis panel, A1AT, Ceruloplasmin,HSV1&2, Acetaminophen level,AFP,Fibrinogen,MICHAEL are all within normal limits  -Ferritin>9000,serum iron-85 WNL  -EBV DNA<35 detected  -IgG-1600  -AMA-29.3 (uln 24.9, >25 is positive), repeat AMA-9/14 is negative  -liver US shows echogenic appearance of the liver compatible with steatosis and normal Doppler interrogation of the hepatic vasculature. There is mild abdominal ascites and a right pleural effusion   -S/p IV Vit K x1 9/11, 1u FFP 9/12 with INR >9.5, now 2.8,aPTT-125.  -had Acetylcysteine in the MICU  -INR-2.8-->1.5-9/14, ALP-804-->693-9/14, AST-945-->401-9/14, ALT-278-->169-9/14.  -MELD-18, MELD-Na-14-9/14  Plan-  -Hepatology following, appreciate recs  -will follow up on pending hepatic work up  -continue with lactulose, titrating to 3-4 BM/day   -Monitor Hgb, Coags, signs of bleed  -Daily MELD labs  [ ] AMA levels sent     #Acute anaemia/concern for hemolysis  -Hgb 12.0 to 9.5   - pale but not jaundiced  -Tbili-2.7, indirect-1.4, LDH-846, Haptoglobin<30  -peripheral blood smear-Mild microcytosis with many target cells and many Pappenheimer bodies. Findings consistent with hemoglobinopathy.   -Hb remains stable   Plan-  -monitor Hb     # Traumatic catheterization  -Difficult catheterization at MICU with bleeding.  -patient accidentally urinated without difficulty  Plan-  -bladder scan to rule out retention of urine if suspected  -urology to pass catheter if there are concerns for retention  - no issues today, no hematuria      #HFpEF  #Tachycardia  -limited ECHO -9/13 shows EF of 78%  -CXR without signs of pulmonary edema  -Tachycardia resolved  -on home metop 25mg bid       DISPO:-if no indication for liver biopsy inpatient, can safely dc home as early as tomorrow morning      FEN  Fluids:  PRN  Electrolytes: K>4, Mg>2  Nutrition: Adult regular diet  Prophylaxis:  DVT ppx: chem ppx held iso coagulopathy, SCDs  GI ppx: PPI  Bowel care: lactulose  Hardware:                        Social:  Code: Full Code    HPOA: Mayela (wife) [phone]  Disposition: Floor           Bismark Rodríguez MD  PGY-1 Neurology

## 2024-09-16 NOTE — CARE PLAN
The patient's goals for the shift include  remaining NPO after midnight by the end of this shift.     The clinical goals for the shift include pt will remain NPO after midnight by the end of this shift.

## 2024-09-16 NOTE — CARE PLAN
The patient's goals for the shift include Pain controlled to level of 4-6 by the end of the shift    The clinical goals for the shift include Plan for liver biopsy by the end of the shift    Over the shift, the patient did not make progress toward the following goals. Barriers to progression include   . Recommendations to address these barriers include   .

## 2024-09-16 NOTE — PROGRESS NOTES
"[facility]  [facility]  CONSULT FOLLOW-UP     Reason For Consult  Acute liver injury     SUBJECTIVE     Pt endorses pain in the mid abdomen, rating 7/10.   Kept NPO MN.   GGT elevated to 1000s consistent of elevated ALP liver origin. T-jocelynn/ALP stopped to decrease while ALT/AST continues to downtrend.     EXAM     Last Recorded Vitals  Blood pressure 135/88, pulse 66, temperature 35.7 °C (96.3 °F), resp. rate 16, height 1.753 m (5' 9\"), weight 75 kg (165 lb 5.5 oz), SpO2 97%.      Intake/Output Summary (Last 24 hours) at 9/16/2024 1217  Last data filed at 9/15/2024 1846  Gross per 24 hour   Intake --   Output 100 ml   Net -100 ml       Physical Exam   General: chronically ill, no acute distress  HEENT: PERRLA, EOM intact, no scleral icterus, poor dentition   Respiratory: normal work of breathing  Cardiovascular: Tachycardic  Abdomen: Soft, nontender, nondistended  Extremities: no edema, no asterixis  Neuro: alert and oriented, moves all 4 extremities with no focal deficits      OBJECTIVE                                                                              Medications             Current Facility-Administered Medications:     pantoprazole (ProtoNix) EC tablet 40 mg, 40 mg, oral, Daily before breakfast, 40 mg at 09/15/24 0514 **OR** esomeprazole (NexIUM) suspension 40 mg, 40 mg, nasoduodenal tube, Daily before breakfast **OR** pantoprazole (ProtoNix) injection 40 mg, 40 mg, intravenous, Daily before breakfast, Ortiz Julian MD, 40 mg at 09/16/24 0517    HYDROmorphone (Dilaudid) injection 0.2 mg, 0.2 mg, intravenous, q4h PRN, Ortiz Julian MD, 0.2 mg at 09/16/24 0528    lactulose 20 gram/30 mL oral solution 20 g, 20 g, oral, TID, Ortiz Julian MD, 20 g at 09/16/24 0853    levothyroxine (Synthroid, Levoxyl) tablet 125 mcg, 125 mcg, oral, Daily, Ortiz Julian MD, 125 mcg at 09/16/24 0517    lidocaine 4 % patch 1 patch, 1 patch, transdermal, " "Daily, Ortiz Julian MD, 1 patch at 09/16/24 0853    melatonin tablet 6 mg, 6 mg, oral, Daily, Ortiz Julian MD, 6 mg at 09/15/24 1744    metoprolol tartrate (Lopressor) tablet 25 mg, 25 mg, oral, BID, Ortiz Julian MD, 25 mg at 09/16/24 0853                                                                            Labs     CBC RFP   Lab Results   Component Value Date    WBC 6.4 09/16/2024    HGB 9.0 (L) 09/16/2024    HCT 26.6 (L) 09/16/2024    MCV 85 09/16/2024     (L) 09/16/2024    NEUTROABS 4.21 09/16/2024    Lab Results   Component Value Date     09/16/2024    K 4.6 09/16/2024     09/16/2024    CO2 23 09/16/2024    BUN 7 09/16/2024    CREATININE 1.17 09/16/2024     Lab Results   Component Value Date    MG 2.00 09/16/2024    PHOS 2.8 09/16/2024    CALCIUM 7.0 (L) 09/16/2024    ALBUMIN 1.6 (L) 09/16/2024         Hepatic Function ABG/VBG   Lab Results   Component Value Date     (H) 09/16/2024     (H) 09/16/2024    GGT 1,069 (H) 09/16/2024    ALKPHOS 654 (H) 09/16/2024     Lab Results   Component Value Date    BILIDIR 1.2 (H) 09/16/2024      Lab Results   Component Value Date    PROTIME 13.4 (H) 09/16/2024    APTT 31 09/16/2024    INR 1.2 (H) 09/16/2024    No results found for: \"NONUHFIRE\", \"LACTATE\"                                                                           Imaging     9/11/24 US Liver w/Dopplers  FINDINGS:     Color and spectral Doppler evaluation of the hepatic vessels and portal circulation demonstrates normal direction and amplitude of blood flow within the left, right, and main portal veins, as well as within the left, right, and middle hepatic veins.  Normal flow is also present within the splenic vein, main hepatic artery, and inferior vena cava.     There is perihepatic ascites and a right-sided pleural effusion. The gallbladder contains sludge. The liver parenchyma is diffusely echogenic suggesting diffuse liver disease such as steatosis.    "      8/12/24 CT AP w/IV Contrast   IMPRESSION:   Rectal wall thickening and mucosal hyperenhancement and perirectal fat   stranding.  Findings suspicious for acute proctitis.  There also appear   to be internal rectal hemorrhoids present.     New small volume of ascites in the abdomen and pelvis, nonspecific.     Liver is diffusely steatotic.     Sequela of chronic pancreatitis redemonstrated with unchanged pancreatic   ductal dilatation.  No acute peripancreatic inflammation.      12/18/2020 US Abdomen   The liver is normal in echotexture.  No hyper or hypo echoic masses   are seen.  There is no intrahepatic biliary ductal dilatation.      1. Pancreatic calcifications with dilated pancreatic duct suggesting   changes of chronic pancreatitis.     2. 2.9 cm nodular area right suprarenal location could represent an   enlarged adrenal gland or lymph node --- this could be further   evaluated with CT as clinically dictated.     3. Tiny left renal medullary calcification possibly calculus without   hydronephrosis. 9 mm right renal cortical cyst.                                                                          GI Procedures     None    ASSESSMENT / PLAN     ASSESSMENT/PLAN:  Manuel Bowser is a 63 y.o. male with a past medical history of HFpEF, DM, chronic pancreatitis, HTN, DLD, AUD admitted on 9/13/2024 as transfer from [facility] ICU for workup of abnormal LFTs . Hepatology is consulted for Acute liver injury.      His LFTs on presentation to [facility] were cholestatic in nature with elevated INR to 2.8 on 9/10 however appears that something acutely occurred from 9/10 to 9/11 as patients LFTs increased significantly and INR >9.5 his bilirubin appears to be largely indirect at this point. Etiology of his abnormal LFTs is unclear at this point, he very likely at baseline has alcohol associated liver disease, his patter of injury at this point is mixed and does not fit with alcohol hepatitis. He has not had any recent  antibiotics or medications (reviewed previous admission medications from 8/2024) that would suggest DILI. His pattern is atypical and, given his INR >9.5, Ferritin >9000 and labs consistent with hemolysis this may be related to hematologic or rheumatologic etiology in origin. At this time he does not appear to have liver failure with no asterixis or encephalopathy on exam. We recommend transjugular liver biopsy for      - Ultrasound Liver with dopplers         patent vasculature   - Hepatitis A virus IgG and IgM           negative 9/10/24  - Hepatitis B surface antigen               negative 9/10/24  - Hepatitis B core Ab IgM/IgG                        negative 9/10/24  - Hep C antibody                                 negative 9/13/24  - Alpha fetoprotein                             4 9/13/24  - Ceruloplasmin                                   WNL  - Alpha-1 - antitrypsin phenotype      WNL  - HIV                                                    negative 9/11/24  - VDRL                                                 NOT ORDERED   - MICHAEL                                                  Negative  - Anti Smooth Muscle Antibody         PENDING   - IgG                                                    PENDING   - LKM Ab                                             PENDING   - Anti Mitochondrial antibody            Negative   - Iron studies with Ferritin                   Iron 85; ferritin >9000 9/13/24  - HSV                                                   Negative   - EBV                                                    PENDING   - PETH                                                  PENDING      # Elevated liver test not consistent with alcoholic hepatitis  # Alcohol-related liver disease, steatosis  # Ascites      Recommendations:  -Transjugular liver biopsy with interventional radiology (IR consult required, NPO after midnight); During the liver biopsy, hepatic venous pressure gradient measurements should be  obtained  -Follow up on Peth, AMA, ASMA  - Consider hematology consult pending hemolysis work up  -In case paracentesis repeated, please obtain 1. Cell count, 2. Fluid culture, 3. Ascites protein, 4. Ascites albumin, 5. Cytology, 6. LDH    Patient was seen and discussed with Dr. Ansari .    Thank you for the consultation. Hepatology will continue to follow .  During weekday hours of 7am-5pm, please do not hesitate to contact me on [employer] Chat or page [phone], if there are any further questions.  After hours, on weekends, and on holidays, please page the on-call GI fellow at [phone].   Thank you.

## 2024-09-17 LAB
ALBUMIN SERPL BCP-MCNC: 1.9 G/DL (ref 3.4–5)
ALP SERPL-CCNC: 678 U/L (ref 33–136)
ALT SERPL W P-5'-P-CCNC: 100 U/L (ref 10–52)
ANION GAP SERPL CALC-SCNC: 14 MMOL/L (ref 10–20)
APTT PPP: 31 SECONDS (ref 27–38)
AST SERPL W P-5'-P-CCNC: 137 U/L (ref 9–39)
ATRIAL RATE: 72 BPM
BASOPHILS # BLD AUTO: 0.01 X10*3/UL (ref 0–0.1)
BASOPHILS NFR BLD AUTO: 0.2 %
BILIRUB DIRECT SERPL-MCNC: 1.4 MG/DL (ref 0–0.3)
BILIRUB SERPL-MCNC: 2.5 MG/DL (ref 0–1.2)
BUN SERPL-MCNC: 6 MG/DL (ref 6–23)
CALCIUM SERPL-MCNC: 7.6 MG/DL (ref 8.6–10.6)
CHLORIDE SERPL-SCNC: 104 MMOL/L (ref 98–107)
CO2 SERPL-SCNC: 23 MMOL/L (ref 21–32)
CREAT SERPL-MCNC: 1.1 MG/DL (ref 0.5–1.3)
EGFRCR SERPLBLD CKD-EPI 2021: 75 ML/MIN/1.73M*2
EOSINOPHIL # BLD AUTO: 0.01 X10*3/UL (ref 0–0.7)
EOSINOPHIL NFR BLD AUTO: 0.2 %
ERYTHROCYTE [DISTWIDTH] IN BLOOD BY AUTOMATED COUNT: 16.4 % (ref 11.5–14.5)
GLUCOSE SERPL-MCNC: 200 MG/DL (ref 74–99)
HCT VFR BLD AUTO: 31.6 % (ref 41–52)
HGB BLD-MCNC: 9.8 G/DL (ref 13.5–17.5)
IMM GRANULOCYTES # BLD AUTO: 0.02 X10*3/UL (ref 0–0.7)
IMM GRANULOCYTES NFR BLD AUTO: 0.3 % (ref 0–0.9)
INR PPP: 1.2 (ref 0.9–1.1)
LABORATORY COMMENT REPORT: NORMAL
LABORATORY COMMENT REPORT: NORMAL
LABORATORY REPORT: NORMAL
LYMPHOCYTES # BLD AUTO: 1.5 X10*3/UL (ref 1.2–4.8)
LYMPHOCYTES NFR BLD AUTO: 24.3 %
MAGNESIUM SERPL-MCNC: 1.75 MG/DL (ref 1.6–2.4)
MCH RBC QN AUTO: 28.1 PG (ref 26–34)
MCHC RBC AUTO-ENTMCNC: 31 G/DL (ref 32–36)
MCV RBC AUTO: 91 FL (ref 80–100)
MITOCHONDRIA AB SER QL IF: NEGATIVE
MONOCYTES # BLD AUTO: 0.61 X10*3/UL (ref 0.1–1)
MONOCYTES NFR BLD AUTO: 9.9 %
NEUTROPHILS # BLD AUTO: 4.02 X10*3/UL (ref 1.2–7.7)
NEUTROPHILS NFR BLD AUTO: 65.1 %
NRBC BLD-RTO: 1.1 /100 WBCS (ref 0–0)
P AXIS: 61 DEGREES
P OFFSET: 208 MS
P ONSET: 162 MS
PATH REPORT.FINAL DX SPEC: NORMAL
PATH REPORT.GROSS SPEC: NORMAL
PATH REPORT.RELEVANT HX SPEC: NORMAL
PATH REPORT.TOTAL CANCER: NORMAL
PETH INTERPRETATION: NORMAL
PHOSPHATE SERPL-MCNC: 2.4 MG/DL (ref 2.5–4.9)
PLATELET # BLD AUTO: 125 X10*3/UL (ref 150–450)
PLPETH BLD-MCNC: <10 NG/ML
POPETH BLD-MCNC: 12 NG/ML
POTASSIUM SERPL-SCNC: 4.3 MMOL/L (ref 3.5–5.3)
PR INTERVAL: 118 MS
PROT SERPL-MCNC: 5.6 G/DL (ref 6.4–8.2)
PROTHROMBIN TIME: 13.2 SECONDS (ref 9.8–12.8)
Q ONSET: 221 MS
QRS COUNT: 12 BEATS
QRS DURATION: 74 MS
QT INTERVAL: 430 MS
QTC CALCULATION(BAZETT): 470 MS
QTC FREDERICIA: 457 MS
R AXIS: 11 DEGREES
RBC # BLD AUTO: 3.49 X10*6/UL (ref 4.5–5.9)
RESIDENT REVIEW: NORMAL
SODIUM SERPL-SCNC: 137 MMOL/L (ref 136–145)
T AXIS: 27 DEGREES
T OFFSET: 436 MS
VENTRICULAR RATE: 72 BPM
WBC # BLD AUTO: 6.2 X10*3/UL (ref 4.4–11.3)

## 2024-09-17 PROCEDURE — 85025 COMPLETE CBC W/AUTO DIFF WBC: CPT

## 2024-09-17 PROCEDURE — 99232 SBSQ HOSP IP/OBS MODERATE 35: CPT | Performed by: STUDENT IN AN ORGANIZED HEALTH CARE EDUCATION/TRAINING PROGRAM

## 2024-09-17 PROCEDURE — 2500000001 HC RX 250 WO HCPCS SELF ADMINISTERED DRUGS (ALT 637 FOR MEDICARE OP)

## 2024-09-17 PROCEDURE — 83735 ASSAY OF MAGNESIUM: CPT

## 2024-09-17 PROCEDURE — 80076 HEPATIC FUNCTION PANEL: CPT

## 2024-09-17 PROCEDURE — 2500000005 HC RX 250 GENERAL PHARMACY W/O HCPCS

## 2024-09-17 PROCEDURE — 84100 ASSAY OF PHOSPHORUS: CPT

## 2024-09-17 PROCEDURE — 36415 COLL VENOUS BLD VENIPUNCTURE: CPT

## 2024-09-17 PROCEDURE — 1100000001 HC PRIVATE ROOM DAILY

## 2024-09-17 PROCEDURE — 97161 PT EVAL LOW COMPLEX 20 MIN: CPT | Mod: GP

## 2024-09-17 PROCEDURE — 82374 ASSAY BLOOD CARBON DIOXIDE: CPT

## 2024-09-17 PROCEDURE — 85610 PROTHROMBIN TIME: CPT

## 2024-09-17 PROCEDURE — 2500000004 HC RX 250 GENERAL PHARMACY W/ HCPCS (ALT 636 FOR OP/ED)

## 2024-09-17 PROCEDURE — 99233 SBSQ HOSP IP/OBS HIGH 50: CPT

## 2024-09-17 RX ORDER — LIDOCAINE HYDROCHLORIDE 20 MG/ML
1 JELLY TOPICAL ONCE
Status: DISCONTINUED | OUTPATIENT
Start: 2024-09-17 | End: 2024-09-23

## 2024-09-17 ASSESSMENT — COGNITIVE AND FUNCTIONAL STATUS - GENERAL
DRESSING REGULAR LOWER BODY CLOTHING: A LITTLE
PERSONAL GROOMING: A LITTLE
MOVING FROM LYING ON BACK TO SITTING ON SIDE OF FLAT BED WITH BEDRAILS: A LITTLE
TURNING FROM BACK TO SIDE WHILE IN FLAT BAD: A LOT
WALKING IN HOSPITAL ROOM: A LOT
MOVING TO AND FROM BED TO CHAIR: A LOT
MOVING TO AND FROM BED TO CHAIR: A LITTLE
CLIMB 3 TO 5 STEPS WITH RAILING: TOTAL
STANDING UP FROM CHAIR USING ARMS: A LOT
WALKING IN HOSPITAL ROOM: A LOT
TURNING FROM BACK TO SIDE WHILE IN FLAT BAD: A LOT
DRESSING REGULAR UPPER BODY CLOTHING: A LITTLE
MOBILITY SCORE: 11
STANDING UP FROM CHAIR USING ARMS: A LITTLE
MOVING FROM LYING ON BACK TO SITTING ON SIDE OF FLAT BED WITH BEDRAILS: A LOT
MOBILITY SCORE: 15
TOILETING: A LOT
CLIMB 3 TO 5 STEPS WITH RAILING: A LOT
HELP NEEDED FOR BATHING: A LITTLE
DAILY ACTIVITIY SCORE: 18

## 2024-09-17 ASSESSMENT — PAIN SCALES - GENERAL
PAINLEVEL_OUTOF10: 0 - NO PAIN
PAINLEVEL_OUTOF10: 8
PAINLEVEL_OUTOF10: 10 - WORST POSSIBLE PAIN

## 2024-09-17 ASSESSMENT — PAIN - FUNCTIONAL ASSESSMENT
PAIN_FUNCTIONAL_ASSESSMENT: 0-10

## 2024-09-17 ASSESSMENT — PAIN DESCRIPTION - LOCATION: LOCATION: ABDOMEN

## 2024-09-17 ASSESSMENT — PAIN DESCRIPTION - DESCRIPTORS: DESCRIPTORS: ACHING

## 2024-09-17 ASSESSMENT — ACTIVITIES OF DAILY LIVING (ADL): ADL_ASSISTANCE: INDEPENDENT

## 2024-09-17 NOTE — PROGRESS NOTES
Physical Therapy    Physical Therapy Evaluation    Patient Name: Manuel Bowser  MRN: [id number]  Today's Date: 9/17/2024   Room: 2067/2067-A  Time Calculation  Start Time: 1102  Stop Time: 1123  Time Calculation (min): 21 min    Assessment/Plan   PT Assessment  PT Assessment Results: Decreased strength, Decreased endurance, Impaired balance, Decreased mobility, Decreased safety awareness, Decreased cognition  Rehab Prognosis: Good  Barriers to Discharge: none  Evaluation/Treatment Tolerance: Patient tolerated treatment well  Medical Staff Made Aware: Yes  Strengths: Attitude of self  Barriers to Participation: Comorbidities  End of Session Communication: Care Coordinator  Assessment Comment: 63 year old male admitted as a transfer from [facility] ICU for workup of acute liver failure. Pt presents with decreased strength, endurance and balance impacting functional mobility. Pt would benefit from continued PT while in hospital to improve functional mobility and return to PLOF.  End of Session Patient Position: Bed, 3 rail up, Alarm on (sitter present in room)  IP OR SWING BED PT PLAN  Inpatient or Swing Bed: Inpatient  PT Plan  Treatment/Interventions: Bed mobility, Transfer training, Gait training, Balance training, Neuromuscular re-education, Strengthening, Endurance training, Therapeutic exercise, Therapeutic activity, Home exercise program, Positioning, Postural re-education  PT Plan: Ongoing PT  PT Frequency: 3 times per week  PT Discharge Recommendations: Moderate intensity level of continued care  PT Recommended Transfer Status: Assist x2, Assistive device  PT - OK to Discharge: Yes (PT eval complete and DC rec made)    Subjective   General Visit Information:  Reason for Referral: 63 year old male admitted as a transfer from [facility] ICU for workup of acute liver failure.  Past Medical History Relevant to Rehab: DM, HTN, HFpEF, alcohol use disorder, pancreatitis  Patient Position Received: Bed, 3 rail up, Alarm on  (sitter present in room)  General Comment: Pt supine in bed upon entry to room. Pt pleasant, cooperative and willing to work with PT.   Home Living:  Home Living  Type of Home: House  Lives With: Spouse  Home Adaptive Equipment: Walker rolling or standard (shower chair)  Home Layout: One level  Home Access: Level entry  Bathroom Shower/Tub: Tub/shower unit  Bathroom Equipment: Shower chair with back, Grab bars in shower  Home Living Comments: pt overall questionable historian  Prior Level of Function:  Prior Function Per Pt/Caregiver Report  Level of [place]: Independent with ADLs and functional transfers, Independent with homemaking with ambulation  Receives Help From:  (pt staing that he is the primary care taker for his wife)  ADL Assistance: Independent  Homemaking Assistance: Independent  Ambulatory Assistance: Independent (fww at baseline)  Prior Function Comments: pt stating that he has had a few falls over the last months, unable to state how many exactly.  Precautions:  Precautions  Medical Precautions: Fall precautions  Vital Signs:  Supine: 122/84 75HR    Objective     Pain:  Pain Assessment  Pain Assessment: 0-10  0-10 (Numeric) Pain Score: 0 - No pain  Cognition:  Cognition  Overall Cognitive Status: Impaired  Orientation Level: Disoriented to situation (verbal cuing for month, year and place)    Extremity/Trunk Assessments:  Strength:    RLE   RLE :  (grossly at least 4/5 based on functional observation)  LLE   LLE :  (grossly at least 4/5 based on functional observation)    General Assessments:    Activity Tolerance  Endurance: Decreased tolerance for upright activites        Static Sitting Balance  Static Sitting-Level of Assistance: Close supervision  Dynamic Sitting Balance  Dynamic Sitting-Level of Assistance: Close supervision  Static Standing Balance  Static Standing-Level of Assistance: Minimum assistance  Dynamic Standing Balance  Dynamic Standing-Level of Assistance: Moderate  assistance    Functional Assessments:  Bed Mobility  Bed Mobility: Yes  Bed Mobility 1  Bed Mobility 1: Supine to sitting  Level of Assistance 1: Minimum assistance, Minimal verbal cues, Minimal tactile cues  Bed Mobility Comments 1: HOB partially elevated  Bed Mobility 2  Bed Mobility  2: Sitting to supine  Level of Assistance 2: Moderate assistance, Moderate verbal cues, Moderate tactile cues  Bed Mobility Comments 2: to advance BLE into bed  Transfers  Transfer: Yes  Transfer 1  Technique 1: Sit to stand, Stand to sit  Transfer Device 1: Walker  Transfer Level of Assistance 1: Moderate assistance, Moderate verbal cues, Moderate tactile cues  Trials/Comments 1: x2 attempts, pt unable to clear buttocks off bed first attempt; bed elevated  Ambulation/Gait Training  Ambulation/Gait Training Performed: Yes  Ambulation/Gait Training 1  Surface 1: Level tile  Device 1: Rolling walker  Assistance 1: Moderate assistance, Moderate verbal cues, Moderate tactile cues  Quality of Gait 1:  (pt appearing to slide feet along floor instead of lifitng to advance steps iniitally. Able to lift feet after verbal cuing)  Comments/Distance (ft) 1: 3ft lateral side steps    Outcome Measures:  [facility] Basic Mobility  Turning from your back to your side while in a flat bed without using bedrails: A lot  Moving from lying on your back to sitting on the side of a flat bed without using bedrails: A lot  Moving to and from bed to chair (including a wheelchair): A lot  Standing up from a chair using your arms (e.g. wheelchair or bedside chair): A lot  To walk in hospital room: A lot  Climbing 3-5 steps with railing: Total  Basic Mobility - Total Score: 11       Encounter Problems       Encounter Problems (Active)       Mobility       Pt will perform bed mobility with cga assist.         Start:  09/17/24    Expected End:  10/01/24            Pt will ambulate >50ft with LRAD and cga Assist with no LOB and VSS.         Start:  09/17/24    Expected  End:  10/01/24            Pt will demonstrate WFL BLE strength to complete therapeutic exercise and participate in functional mobility.         Start:  09/17/24    Expected End:  10/01/24               PT Transfers       Pt will perform all functional transfers with LRAD and cga assist.         Start:  09/17/24    Expected End:  10/01/24                 Education Documentation  Mobility Training, taught by Vicki Jacome PT at 9/17/2024  1:48 PM.  Learner: Patient  Readiness: Acceptance  Method: Explanation  Response: Verbalizes Understanding, Needs Reinforcement  Comment: importance of functional mobility    Education Comments  No comments found.      09/17/24 at 1:49 PM   Vicki Jacome, PT   Rehab Office: [phone]

## 2024-09-17 NOTE — PROGRESS NOTES
Per medical team, pt confused today. [facility] attempted to call pt's spouse, Mayela to discuss care team reccs for MOD intensity but there was no answer and unable to leave a VM message as the mailbox was full. Will attempt to call pt's spouse again at a later time.  [facility] called pt's sister, Yarelis and discussed care team reccs for MOD intensity and she is agreeable. Per Yarelis pt and family have discussed SNF preferences in the past and [facility] in order of preference is 1) [facility] 2) [facility] [place]. [facility] made Yarelis aware [facility] left SNF list at the bedside, she stated SNF list can be sent to her via email at [email or web address]. Per Yarelis, pt's other sister Chelsea ([phone]) can be called if needed as well to assist with dispo plans as she states they all work together. Per Yarelis, will reach out to pt's spouse, Mayela to let her know [facility] trying to reach her regarding dispo plans. Will update medical team and will continue to follow.

## 2024-09-17 NOTE — PROGRESS NOTES
Manuel Bowser is a 63 y.o. male on day 4 of admission presenting with Alcoholic liver failure (Multi).      Subjective   Patient was seen. He's is doing well. A&Ox4.  Had 3 BM yesterday. No active complaints.  No acute events overnight.      Objective     Last Recorded Vitals  /78 (BP Location: Left arm)   Pulse 83   Temp 36.6 °C (97.9 °F) (Temporal)   Resp 18   Wt 74.2 kg (163 lb 9.3 oz)   SpO2 96%   Intake/Output last 3 Shifts:  No intake or output data in the 24 hours ending 09/17/24 1102      Admission Weight  Weight: 67.5 kg (148 lb 13 oz) (09/13/24 0100)    Daily Weight  09/17/24 : 74.2 kg (163 lb 9.3 oz)    Image Results      Physical Exam  Physical Exam  Constitutional:       General: He is not in acute distress.     Appearance: He is not diaphoretic.   HENT:      Head: Normocephalic.      Mouth/Throat:      Mouth: Mucous membranes are moist.   Cardiovascular:      Rate and Rhythm: Normal rate and regular rhythm.      Heart sounds: No murmur heard.  Pulmonary:      Effort: No respiratory distress.      Breath sounds: No rhonchi or rales.   Abdominal:      General: slightly distended abdomen       Palpations: There is no mass.      Tenderness: There is no abdominal tenderness. There is no guarding or rebound.   Musculoskeletal:         General: No swelling. No asterixis.      Right lower leg: No edema.      Left lower leg: No edema.   Skin:     Capillary Refill: Capillary refill takes less than 2 seconds.      Coloration: Skin is pale. Skin is not jaundiced.      Findings: No bruising.   Neurological:      General: No focal deficit present.      Mental Status: He is alert and oriented to person, place, and time.        Relevant Results  Results for orders placed or performed during the hospital encounter of 09/13/24 (from the past 24 hour(s))   Sterile Fluid Culture/Smear    Specimen: Ascites Fluid   Result Value Ref Range    Sterile Fluid Culture/Smear No growth to date     Gram Stain (1+) Rare  Polymorphonuclear leukocytes     Gram Stain No organisms seen    Body Fluid Cell Count   Result Value Ref Range    Color, Fluid Straw Colorless, Straw, Yellow    Clarity, Fluid Clear Clear    WBC, Fluid 10 See Comment /uL    RBC, Fluid 11 0  /uL /uL   Body Fluid Differential   Result Value Ref Range    Neutrophils %, Manual, Fluid 5 <25 % %    Lymphocytes %, Manual, Fluid 39 <75 % %    Mono/Macrophages %, Manual, Fluid 56 <70 % %    Eosinophils %, Manual, Fluid 0 0 % %    Basophils %, Manual, Fluid 0 0 % %    Immature Granulocytes %, Manual, Fluid 0 0 % %    Blasts %, Manual, Fluid 0 0 % %    Unclassified Cells %, Manual, Fluid 0 0 % %    Plasma Cells %, Manual, Fluid 0 0 % %    Total Cells Counted, Fluid 100    Protein, Total Fluid   Result Value Ref Range    Protein, Total Fluid <0.5 Not established g/dL   Albumin, Fluid   Result Value Ref Range    Albumin, Fluid <0.5 Not established g/dL   Lactate Dehydrogenase, Fluid   Result Value Ref Range    LD, Fluid 43 Not established. U/L   CBC and Auto Differential   Result Value Ref Range    WBC 6.2 4.4 - 11.3 x10*3/uL    nRBC 1.1 (H) 0.0 - 0.0 /100 WBCs    RBC 3.49 (L) 4.50 - 5.90 x10*6/uL    Hemoglobin 9.8 (L) 13.5 - 17.5 g/dL    Hematocrit 31.6 (L) 41.0 - 52.0 %    MCV 91 80 - 100 fL    MCH 28.1 26.0 - 34.0 pg    MCHC 31.0 (L) 32.0 - 36.0 g/dL    RDW 16.4 (H) 11.5 - 14.5 %    Platelets 125 (L) 150 - 450 x10*3/uL    Neutrophils % 65.1 40.0 - 80.0 %    Immature Granulocytes %, Automated 0.3 0.0 - 0.9 %    Lymphocytes % 24.3 13.0 - 44.0 %    Monocytes % 9.9 2.0 - 10.0 %    Eosinophils % 0.2 0.0 - 6.0 %    Basophils % 0.2 0.0 - 2.0 %    Neutrophils Absolute 4.02 1.20 - 7.70 x10*3/uL    Immature Granulocytes Absolute, Automated 0.02 0.00 - 0.70 x10*3/uL    Lymphocytes Absolute 1.50 1.20 - 4.80 x10*3/uL    Monocytes Absolute 0.61 0.10 - 1.00 x10*3/uL    Eosinophils Absolute 0.01 0.00 - 0.70 x10*3/uL    Basophils Absolute 0.01 0.00 - 0.10 x10*3/uL   Coagulation Screen    Result Value Ref Range    Protime 13.2 (H) 9.8 - 12.8 seconds    INR 1.2 (H) 0.9 - 1.1    aPTT 31 27 - 38 seconds        Assessment/Plan      Assessment & Plan  Alcoholic liver failure (Multi)    Acute on chronic alcoholic liver disease (Multi)    Hepatic encephalopathy (Multi)    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 He was started on Lactulose from OSH and had acetylcysteine since his admission to  MICU. Was said to be encephalopathic at OSH but was AOX4 upon arrival to  MICU. Hepatology was consulted and would appreciate their recs.       Update 9/17/2014:  - A&Ox4   - Hepatology onboard, appreciate their recs.   - S/P transjugular liver biopsy and paracentesis by IR yesterday.   - Paracentesis fluid analysis results were unremarkable.  - Hgb is 9.8 today, and INR is 1.2 ; will continue monitoring.   - Continue titration of bowel movements to 3-5 daily with lactulose.   - Pending surgical pathology biopsy, blood smear and AMA results  - PT/OT for possible discharge           #Acute liver injury/failure, possible acute on chronic liver disease  #HE, resolved   #Coagulopathy, resolved   -Hx of chronic alcohol use disorder  -Patient presents with acute liver injury with INR >9.5 +/- failure given reported encephalopathy with A&Ox2 at OSH. Currently A&Ox4, no asterixis or c/f current encephalopathy though has been treated with lactulose  -Hepatic function panel severely deranged-Alb-1.7,ALP-804,ALT-278,AST-945  -Hepatitis panel, A1AT, Ceruloplasmin,HSV1&2, Acetaminophen level,AFP,Fibrinogen,MICHAEL are all within normal limits  -Ferritin>9000,serum iron-85 WNL  -EBV  DNA<35 detected  -IgG-1600  -AMA-29.3 (uln 24.9, >25 is positive), repeat AMA-9/14 is negative  -liver US shows echogenic appearance of the liver compatible with steatosis and normal Doppler interrogation of the hepatic vasculature. There is mild abdominal ascites and a right pleural effusion   -S/p IV Vit K x1 9/11, 1u FFP 9/12 with INR >9.5, now 2.8,aPTT-125.  -had Acetylcysteine in the MICU  -INR-2.8-->1.5-9/14, ALP-804-->693-9/14, AST-945-->401-9/14, ALT-278-->169-9/14.  -MELD-18, MELD-Na-14-9/14  Plan-  -Hepatology following, appreciate recs  -will follow up on pending hepatic work up  -continue with lactulose, titrating to 3-4 BM/day   -Monitor Hgb, Coags, signs of bleed  -Daily MELD labs  [ ] AMA levels sent     #Acute anaemia/concern for hemolysis  -Hgb 12.0 to 9.5   - pale but not jaundiced  -Tbili-2.7, indirect-1.4, LDH-846, Haptoglobin<30  -peripheral blood smear-Mild microcytosis with many target cells and many Pappenheimer bodies. Findings consistent with hemoglobinopathy.   -Hb remains stable   Plan-  -monitor Hb     # Traumatic catheterization  -Difficult catheterization at MICU with bleeding.  -patient accidentally urinated without difficulty  Plan-  -bladder scan to rule out retention of urine if suspected  -urology to pass catheter if there are concerns for retention  - no issues today, no hematuria      #HFpEF  #Tachycardia  -limited ECHO -9/13 shows EF of 78%  -CXR without signs of pulmonary edema  -Tachycardia resolved  -on home metop 25mg bid       DISPO:-if no indication for liver biopsy inpatient, can safely dc home as early as tomorrow morning      FEN  Fluids: PRN  Electrolytes: K>4, Mg>2  Nutrition: Adult regular diet  Prophylaxis:  DVT ppx: chem ppx held iso coagulopathy, SCDs  GI ppx: PPI  Bowel care: lactulose  Hardware:                        Social:  Code: Full Code    HPOA: Mayela (wife) [phone]  Disposition: Floor           Bismark Rodríguez MD  PGY-1 Neurology

## 2024-09-17 NOTE — PROGRESS NOTES
"[facility]  [facility]  CONSULT FOLLOW-UP     Reason For Consult  Acute liver injury     SUBJECTIVE     Had transjugular liver biopsy. HVPG 7>5. Had 600mL of ascitic fluid removed.   Denies pain after getting liver biopsy.   Asking when he would be able to get discharged.     EXAM     Last Recorded Vitals  Blood pressure 126/90, pulse 78, temperature 36.5 °C (97.7 °F), temperature source Tympanic, resp. rate 16, height 1.753 m (5' 9\"), weight 74.2 kg (163 lb 9.3 oz), SpO2 97%.    No intake or output data in the 24 hours ending 09/17/24 1218      Physical Exam   General: chronically ill, no acute distress  HEENT: PERRLA, EOM intact, no scleral icterus, poor dentition   Respiratory: normal work of breathing  Cardiovascular: Tachycardic  Abdomen: Soft, nontender, nondistended  Extremities: no edema, no asterixis  Neuro: alert and oriented, moves all 4 extremities with no focal deficits      OBJECTIVE                                                                              Medications             Current Facility-Administered Medications:     pantoprazole (ProtoNix) EC tablet 40 mg, 40 mg, oral, Daily before breakfast, 40 mg at 09/17/24 0845 **OR** esomeprazole (NexIUM) suspension 40 mg, 40 mg, nasoduodenal tube, Daily before breakfast **OR** pantoprazole (ProtoNix) injection 40 mg, 40 mg, intravenous, Daily before breakfast, Ortiz Julian MD, 40 mg at 09/16/24 0517    HYDROmorphone (Dilaudid) injection 0.2 mg, 0.2 mg, intravenous, q4h PRN, Ortiz Julian MD, 0.2 mg at 09/17/24 0425    lactulose 20 gram/30 mL oral solution 20 g, 20 g, oral, TID, Ortiz Julian MD, 20 g at 09/17/24 0845    levothyroxine (Synthroid, Levoxyl) tablet 125 mcg, 125 mcg, oral, Daily, Ortiz Julian MD, 125 mcg at 09/17/24 0620    lidocaine 4 % patch 1 patch, 1 patch, transdermal, Daily, Ortiz Julian MD, 1 patch at 09/17/24 0845    melatonin tablet 6 mg, 6 mg, oral, " "Daily, Ortiz Julian MD, 6 mg at 09/16/24 2010    metoprolol tartrate (Lopressor) tablet 25 mg, 25 mg, oral, BID, Ortiz Julian MD, 25 mg at 09/17/24 0845    potassium phosphate (monobasic) (K-Phos) tablet 500 mg, 500 mg, oral, 4x daily, Bismark Rodríguez MD                                                                            Labs     CBC RFP   Lab Results   Component Value Date    WBC 6.2 09/17/2024    HGB 9.8 (L) 09/17/2024    HCT 31.6 (L) 09/17/2024    MCV 91 09/17/2024     (L) 09/17/2024    NEUTROABS 4.02 09/17/2024    Lab Results   Component Value Date     09/17/2024    K 4.3 09/17/2024     09/17/2024    CO2 23 09/17/2024    BUN 6 09/17/2024    CREATININE 1.10 09/17/2024     Lab Results   Component Value Date    MG 1.75 09/17/2024    PHOS 2.4 (L) 09/17/2024    CALCIUM 7.6 (L) 09/17/2024    ALBUMIN 1.9 (L) 09/17/2024         Hepatic Function ABG/VBG   Lab Results   Component Value Date     (H) 09/17/2024     (H) 09/17/2024    GGT 1,069 (H) 09/16/2024    ALKPHOS 678 (H) 09/17/2024     Lab Results   Component Value Date    BILIDIR 1.4 (H) 09/17/2024      Lab Results   Component Value Date    PROTIME 13.2 (H) 09/17/2024    APTT 31 09/17/2024    INR 1.2 (H) 09/17/2024    No results found for: \"NONUHFIRE\", \"LACTATE\"                                                                           Imaging     9/11/24 US Liver w/Dopplers  FINDINGS:     Color and spectral Doppler evaluation of the hepatic vessels and portal circulation demonstrates normal direction and amplitude of blood flow within the left, right, and main portal veins, as well as within the left, right, and middle hepatic veins.  Normal flow is also present within the splenic vein, main hepatic artery, and inferior vena cava.     There is perihepatic ascites and a right-sided pleural effusion. The gallbladder contains sludge. The liver parenchyma is diffusely echogenic suggesting diffuse liver disease such as " steatosis.         8/12/24 CT AP w/IV Contrast   IMPRESSION:   Rectal wall thickening and mucosal hyperenhancement and perirectal fat   stranding.  Findings suspicious for acute proctitis.  There also appear   to be internal rectal hemorrhoids present.     New small volume of ascites in the abdomen and pelvis, nonspecific.     Liver is diffusely steatotic.     Sequela of chronic pancreatitis redemonstrated with unchanged pancreatic   ductal dilatation.  No acute peripancreatic inflammation.      12/18/2020 US Abdomen   The liver is normal in echotexture.  No hyper or hypo echoic masses   are seen.  There is no intrahepatic biliary ductal dilatation.      1. Pancreatic calcifications with dilated pancreatic duct suggesting   changes of chronic pancreatitis.     2. 2.9 cm nodular area right suprarenal location could represent an   enlarged adrenal gland or lymph node --- this could be further   evaluated with CT as clinically dictated.     3. Tiny left renal medullary calcification possibly calculus without   hydronephrosis. 9 mm right renal cortical cyst.                                                                          GI Procedures     None    ASSESSMENT / PLAN     ASSESSMENT/PLAN:  Manuel Bowser is a 63 y.o. male with a past medical history of HFpEF, DM, chronic pancreatitis, HTN, DLD, AUD admitted on 9/13/2024 as transfer from [facility] ICU for workup of abnormal LFTs . Hepatology is consulted for Acute liver injury.      His LFTs on presentation to [facility] were cholestatic in nature with elevated INR to 2.8 on 9/10 however appears that something acutely occurred from 9/10 to 9/11 as patients LFTs increased significantly and INR >9.5 his bilirubin appears to be largely indirect at this point. Etiology of his abnormal LFTs is unclear at this point, he very likely at baseline has alcohol associated liver disease, his patter of injury at this point is mixed and does not fit with alcohol hepatitis. He has not had  any recent antibiotics or medications (reviewed previous admission medications from 8/2024) that would suggest DILI. His pattern is atypical and, given his INR >9.5, Ferritin >9000 and labs consistent with hemolysis this may be related to hematologic or rheumatologic etiology in origin. At this time he does not appear to have liver failure with no asterixis or encephalopathy on exam.     9/17 Update: Patient underwent transjugular liver liver biopsy with HVPG 7 confirmed portal HTN however it is less than 10 thus not significant enough to cause CSPH = ascites. Therefore infiltrative liver diseases are on high differential. Hoping liver biopsy will guide this.      - Ultrasound Liver with dopplers         patent vasculature   - Hepatitis A virus IgG and IgM           negative 9/10/24  - Hepatitis B surface antigen               negative 9/10/24  - Hepatitis B core Ab IgM/IgG                        negative 9/10/24  - Hep C antibody                                 negative 9/13/24  - Alpha fetoprotein                             4 9/13/24  - Ceruloplasmin                                   WNL  - Alpha-1 - antitrypsin phenotype      WNL  - HIV                                                    negative 9/11/24  - VDRL                                                 NOT ORDERED   - MICHAEL                                                  Negative  - Anti Smooth Muscle Antibody         PENDING   - IgG                                                    PENDING   - LKM Ab                                             PENDING   - Anti Mitochondrial antibody            Negative   - Iron studies with Ferritin                   Iron 85; ferritin >9000 9/13/24  - HSV                                                   Negative   - EBV                                                    PENDING   - PETH                                                  PENDING      # Elevated liver test not consistent with alcoholic hepatitis  #  Alcohol-related liver disease, steatosis  # Ascites      Recommendations:  -Await liver biopsy result. This can be followed up as outpatient.   -Follow up on Peth, AMA. This can be followed up as outpatient.   -Patient to follow up with Dr. Ansari in 1~2 weeks. Hepatology will set this up.     Patient was seen and discussed with Dr. Ansari .    Thank you for the consultation. Hepatology will sign off.  During weekday hours of 7am-5pm, please do not hesitate to contact me on [employer] or page [phone], if there are any further questions.  After hours, on weekends, and on holidays, please page the [place] GI fellow [place] [phone].   Thank you.

## 2024-09-17 NOTE — CARE PLAN
Problem: Fall/Injury  Goal: Not fall by end of shift  Outcome: Progressing  Goal: Be free from injury by end of the shift  Outcome: Progressing  Goal: Verbalize understanding of personal risk factors for fall in the hospital  Outcome: Progressing  Goal: Verbalize understanding of risk factor reduction measures to prevent injury from fall in the home  Outcome: Progressing  Goal: Use assistive devices by end of the shift  Outcome: Progressing  Goal: Pace activities to prevent fatigue by end of the shift  Outcome: Progressing     Problem: Skin  Goal: Decreased wound size/increased tissue granulation at next dressing change  Outcome: Progressing  Flowsheets (Taken 9/16/2024 0352 by Ruma Mahan, RN)  Decreased wound size/increased tissue granulation at next dressing change: Promote sleep for wound healing  Goal: Participates in plan/prevention/treatment measures  Outcome: Progressing  Flowsheets (Taken 9/16/2024 0352 by Ruma Mahan RN)  Participates in plan/prevention/treatment measures: Elevate heels  Goal: Prevent/manage excess moisture  Outcome: Progressing  Flowsheets (Taken 9/15/2024 0757 by Jenna Reyes RN)  Prevent/manage excess moisture:   Cleanse incontinence/protect with barrier cream   Follow provider orders for dressing changes   Moisturize dry skin  Goal: Prevent/minimize sheer/friction injuries  Outcome: Progressing  Flowsheets (Taken 9/16/2024 0352 by Ruma Mahan RN)  Prevent/minimize sheer/friction injuries: HOB 30 degrees or less  Goal: Promote/optimize nutrition  Outcome: Progressing  Flowsheets (Taken 9/16/2024 0352 by Ruma Mahan RN)  Promote/optimize nutrition: Assist with feeding  Goal: Promote skin healing  Outcome: Progressing  Flowsheets (Taken 9/16/2024 0352 by Ruma Mahan RN)  Promote skin healing: Turn/reposition every 2 hours/use positioning/transfer devices     Problem: Skin  Goal: Participates in plan/prevention/treatment measures  Outcome:  Progressing  Flowsheets (Taken 9/16/2024 0352 by Ruma Mahan RN)  Participates in plan/prevention/treatment measures: Elevate heels     Problem: Pain  Goal: Takes deep breaths with improved pain control throughout the shift  Outcome: Progressing  Goal: Turns in bed with improved pain control throughout the shift  Outcome: Progressing  Goal: Walks with improved pain control throughout the shift  Outcome: Progressing  Goal: Performs ADL's with improved pain control throughout shift  Outcome: Progressing  Goal: Participates in PT with improved pain control throughout the shift  Outcome: Progressing  Goal: Free from opioid side effects throughout the shift  Outcome: Progressing  Goal: Free from acute confusion related to pain meds throughout the shift  Outcome: Progressing   The patient's goals for the shift include Pain controlled to level of 4-6 by the end of the shift    The clinical goals for the shift include Patient will remain free of falls during shift    Over the shift, the patient did not make progress toward the following goals. Barriers to progression include . Recommendations to address these barriers include .

## 2024-09-17 NOTE — PROGRESS NOTES
Occupational Therapy                 Therapy Communication Note    Patient Name: Manuel Bowser  MRN: [id number]  Department: [place] [address]  Room: 2067/2067-A  Today's Date: 9/17/2024     Discipline: Occupational Therapy    Missed Visit Reason: Missed Visit Reason: Patient sleeping (Pt in and out of sleep with difficulty remaining awake. RN aware and checked on patient. Will reattempt as schedule permits.)    Missed Time: Attempt    KATHERINE Salcedo/L

## 2024-09-17 NOTE — NURSING NOTE
1800 bladder scan performed and 310 in bladder. Patient had 1 unmeasured void this shift.     1856 MD notified of urinary retention.

## 2024-09-18 ENCOUNTER — TELEPHONE (OUTPATIENT)
Dept: GASTROENTEROLOGY | Facility: HOSPITAL | Age: 64
End: 2024-09-18
Payer: COMMERCIAL

## 2024-09-18 LAB
ALBUMIN SERPL BCP-MCNC: 1.8 G/DL (ref 3.4–5)
ALP SERPL-CCNC: 590 U/L (ref 33–136)
ALT SERPL W P-5'-P-CCNC: 80 U/L (ref 10–52)
ANION GAP SERPL CALC-SCNC: 15 MMOL/L (ref 10–20)
APTT PPP: 25 SECONDS (ref 27–38)
AST SERPL W P-5'-P-CCNC: 103 U/L (ref 9–39)
BASOPHILS # BLD MANUAL: 0 X10*3/UL (ref 0–0.1)
BASOPHILS NFR BLD MANUAL: 0 %
BILIRUB DIRECT SERPL-MCNC: 1.1 MG/DL (ref 0–0.3)
BILIRUB SERPL-MCNC: 2.7 MG/DL (ref 0–1.2)
BUN SERPL-MCNC: 5 MG/DL (ref 6–23)
CALCIUM SERPL-MCNC: 7.4 MG/DL (ref 8.6–10.6)
CHLORIDE SERPL-SCNC: 105 MMOL/L (ref 98–107)
CO2 SERPL-SCNC: 21 MMOL/L (ref 21–32)
CREAT SERPL-MCNC: 0.83 MG/DL (ref 0.5–1.3)
EGFRCR SERPLBLD CKD-EPI 2021: >90 ML/MIN/1.73M*2
EOSINOPHIL # BLD MANUAL: 0 X10*3/UL (ref 0–0.7)
EOSINOPHIL NFR BLD MANUAL: 0 %
ERYTHROCYTE [DISTWIDTH] IN BLOOD BY AUTOMATED COUNT: 15.9 % (ref 11.5–14.5)
GLUCOSE SERPL-MCNC: 132 MG/DL (ref 74–99)
HCT VFR BLD AUTO: 28.4 % (ref 41–52)
HGB BLD-MCNC: 9.2 G/DL (ref 13.5–17.5)
HOWELL-JOLLY BOD BLD QL SMEAR: PRESENT
IMM GRANULOCYTES # BLD AUTO: 0.02 X10*3/UL (ref 0–0.7)
IMM GRANULOCYTES NFR BLD AUTO: 0.3 % (ref 0–0.9)
INR PPP: 1.3 (ref 0.9–1.1)
LYMPHOCYTES # BLD MANUAL: 0.25 X10*3/UL (ref 1.2–4.8)
LYMPHOCYTES NFR BLD MANUAL: 4.3 %
MAGNESIUM SERPL-MCNC: 1.78 MG/DL (ref 1.6–2.4)
MCH RBC QN AUTO: 28.5 PG (ref 26–34)
MCHC RBC AUTO-ENTMCNC: 32.4 G/DL (ref 32–36)
MCV RBC AUTO: 88 FL (ref 80–100)
MONOCYTES # BLD MANUAL: 0.35 X10*3/UL (ref 0.1–1)
MONOCYTES NFR BLD MANUAL: 6.1 %
NEUTS SEG # BLD MANUAL: 5.09 X10*3/UL (ref 1.2–7)
NEUTS SEG NFR BLD MANUAL: 87.8 %
NRBC BLD-RTO: 2.1 /100 WBCS (ref 0–0)
PAPPENHEIMER BOD BLD QL SMEAR: PRESENT
PHOSPHATE SERPL-MCNC: 2.3 MG/DL (ref 2.5–4.9)
PLATELET # BLD AUTO: 87 X10*3/UL (ref 150–450)
POTASSIUM SERPL-SCNC: 4.3 MMOL/L (ref 3.5–5.3)
PROT SERPL-MCNC: 5.3 G/DL (ref 6.4–8.2)
PROTHROMBIN TIME: 15.1 SECONDS (ref 9.8–12.8)
RBC # BLD AUTO: 3.23 X10*6/UL (ref 4.5–5.9)
RBC MORPH BLD: ABNORMAL
SODIUM SERPL-SCNC: 137 MMOL/L (ref 136–145)
TOTAL CELLS COUNTED BLD: 115
VARIANT LYMPHS # BLD MANUAL: 0.1 X10*3/UL (ref 0–0.5)
VARIANT LYMPHS NFR BLD: 1.8 %
WBC # BLD AUTO: 5.8 X10*3/UL (ref 4.4–11.3)

## 2024-09-18 PROCEDURE — 83735 ASSAY OF MAGNESIUM: CPT

## 2024-09-18 PROCEDURE — 2500000001 HC RX 250 WO HCPCS SELF ADMINISTERED DRUGS (ALT 637 FOR MEDICARE OP)

## 2024-09-18 PROCEDURE — 84100 ASSAY OF PHOSPHORUS: CPT

## 2024-09-18 PROCEDURE — 97535 SELF CARE MNGMENT TRAINING: CPT | Mod: GO

## 2024-09-18 PROCEDURE — 99231 SBSQ HOSP IP/OBS SF/LOW 25: CPT

## 2024-09-18 PROCEDURE — 85027 COMPLETE CBC AUTOMATED: CPT

## 2024-09-18 PROCEDURE — 97165 OT EVAL LOW COMPLEX 30 MIN: CPT | Mod: GO

## 2024-09-18 PROCEDURE — 1100000001 HC PRIVATE ROOM DAILY

## 2024-09-18 PROCEDURE — 82248 BILIRUBIN DIRECT: CPT

## 2024-09-18 PROCEDURE — 2500000004 HC RX 250 GENERAL PHARMACY W/ HCPCS (ALT 636 FOR OP/ED)

## 2024-09-18 PROCEDURE — 85610 PROTHROMBIN TIME: CPT

## 2024-09-18 PROCEDURE — 85007 BL SMEAR W/DIFF WBC COUNT: CPT

## 2024-09-18 PROCEDURE — 80048 BASIC METABOLIC PNL TOTAL CA: CPT

## 2024-09-18 PROCEDURE — 97530 THERAPEUTIC ACTIVITIES: CPT | Mod: GO

## 2024-09-18 PROCEDURE — 36415 COLL VENOUS BLD VENIPUNCTURE: CPT

## 2024-09-18 PROCEDURE — 2500000005 HC RX 250 GENERAL PHARMACY W/O HCPCS

## 2024-09-18 ASSESSMENT — COGNITIVE AND FUNCTIONAL STATUS - GENERAL
TOILETING: A LITTLE
HELP NEEDED FOR BATHING: A LOT
MOVING TO AND FROM BED TO CHAIR: A LITTLE
MOVING FROM LYING ON BACK TO SITTING ON SIDE OF FLAT BED WITH BEDRAILS: A LITTLE
CLIMB 3 TO 5 STEPS WITH RAILING: A LOT
TOILETING: A LOT
MOBILITY SCORE: 15
DRESSING REGULAR LOWER BODY CLOTHING: A LOT
DRESSING REGULAR UPPER BODY CLOTHING: A LITTLE
DRESSING REGULAR LOWER BODY CLOTHING: A LITTLE
DAILY ACTIVITIY SCORE: 19
PERSONAL GROOMING: A LITTLE
EATING MEALS: A LITTLE
PERSONAL GROOMING: A LITTLE
DRESSING REGULAR UPPER BODY CLOTHING: A LOT
HELP NEEDED FOR BATHING: A LITTLE
WALKING IN HOSPITAL ROOM: A LOT
TURNING FROM BACK TO SIDE WHILE IN FLAT BAD: A LOT
DAILY ACTIVITIY SCORE: 14
STANDING UP FROM CHAIR USING ARMS: A LITTLE

## 2024-09-18 ASSESSMENT — PAIN SCALES - GENERAL
PAINLEVEL_OUTOF10: 9
PAINLEVEL_OUTOF10: 5 - MODERATE PAIN
PAINLEVEL_OUTOF10: 9
PAINLEVEL_OUTOF10: 0 - NO PAIN
PAINLEVEL_OUTOF10: 8
PAINLEVEL_OUTOF10: 8

## 2024-09-18 ASSESSMENT — PAIN DESCRIPTION - LOCATION
LOCATION: ABDOMEN
LOCATION: GENERALIZED
LOCATION: HIP

## 2024-09-18 ASSESSMENT — ACTIVITIES OF DAILY LIVING (ADL)
HOME_MANAGEMENT_TIME_ENTRY: 15
BATHING_ASSISTANCE: MODERATE

## 2024-09-18 ASSESSMENT — PAIN - FUNCTIONAL ASSESSMENT
PAIN_FUNCTIONAL_ASSESSMENT: 0-10

## 2024-09-18 NOTE — SIGNIFICANT EVENT
Hepatology will sign off at this time. Please call back us if pathology report came back while pt is being admitted. Otherwise will follow up on outpatient. Has appt with Dr. Monk 10/9/2024.

## 2024-09-18 NOTE — CARE PLAN
Problem: Fall/Injury  Goal: Not fall by end of shift  Outcome: Progressing  Goal: Be free from injury by end of the shift  Outcome: Progressing  Goal: Verbalize understanding of personal risk factors for fall in the hospital  Outcome: Progressing  Goal: Verbalize understanding of risk factor reduction measures to prevent injury from fall in the home  Outcome: Progressing  Goal: Use assistive devices by end of the shift  Outcome: Progressing  Goal: Pace activities to prevent fatigue by end of the shift  Outcome: Progressing     Problem: Skin  Goal: Decreased wound size/increased tissue granulation at next dressing change  Outcome: Progressing  Goal: Participates in plan/prevention/treatment measures  Outcome: Progressing  Goal: Prevent/manage excess moisture  Outcome: Progressing  Goal: Prevent/minimize sheer/friction injuries  Outcome: Progressing  Goal: Promote/optimize nutrition  Outcome: Progressing  Goal: Promote skin healing  Outcome: Progressing     Problem: Pain  Goal: Takes deep breaths with improved pain control throughout the shift  Outcome: Progressing  Goal: Turns in bed with improved pain control throughout the shift  Outcome: Progressing  Goal: Walks with improved pain control throughout the shift  Outcome: Progressing  Goal: Performs ADL's with improved pain control throughout shift  Outcome: Progressing  Goal: Participates in PT with improved pain control throughout the shift  Outcome: Progressing  Goal: Free from opioid side effects throughout the shift  Outcome: Progressing  Goal: Free from acute confusion related to pain meds throughout the shift  Outcome: Progressing

## 2024-09-18 NOTE — PROGRESS NOTES
Manuel Bowser is a 63 y.o. male on day 5 of admission presenting with Alcoholic liver failure (Multi).      Subjective   Patient was seen and examined at bedside this AM. On evaluation, he denies any new concerns or symptoms.   He denies any pain or issues since his paracentesis and liver biopsy procedure two days ago.   He denies any pain, fever, chills, n/v, constipation, diarrhea.     Objective     Last Recorded Vitals  /84   Pulse 82   Temp 36.2 °C (97.2 °F)   Resp 16   Wt 72.2 kg (159 lb 1.6 oz)   SpO2 99%   Intake/Output last 3 Shifts:  No intake or output data in the 24 hours ending 09/18/24 1124      Admission Weight  Weight: 67.5 kg (148 lb 13 oz) (09/13/24 0100)    Daily Weight  09/18/24 : 72.2 kg (159 lb 1.6 oz)    Image Results      Physical Exam  Constitutional:       General: He is not in acute distress. A&O x4. Has somewhat flat affect, makes seldom eye contact     Appearance: He is not diaphoretic.   HENT:      Head: Normocephalic.      Mouth/Throat:      Mouth: Mucous membranes are moist.   Cardiovascular:      Rate and Rhythm: Normal rate and regular rhythm.      Heart sounds: No murmur heard.  Pulmonary:      Effort: No respiratory distress.      Breath sounds: No rhonchi or rales.   Abdominal:      General: slightly distended abdomen       Palpations: There is no mass.      Tenderness: There is no abdominal tenderness. There is no guarding or rebound.   Musculoskeletal:         General: No swelling. No asterixis.      Right lower leg: No edema.      Left lower leg: No edema.   Skin:     Capillary Refill: Capillary refill takes less than 2 seconds.      Coloration: Skin is pale. Skin is not jaundiced.      Findings: No bruising.      Right jugular   Neurological:      General: No focal deficit present.      Mental Status: He is alert and oriented to person, place, and time.        Relevant Results  Results for orders placed or performed during the hospital encounter of 09/13/24 (from  the past 24 hour(s))   Non-gynecologic cytology   Result Value Ref Range    Case Report       Non-gynecologic Cytology                          Case: [id number]                                   Authorizing Provider:  Pardeep Thompson MD      Collected:                                        Ordering Location:     [facility]       Received:            09/16/2024 [address] [place] [address]                                                           Pathologist:           Carrie Jarrell MD                                                         Specimen:    ASCITIC FLUID                                                                              Final Cytological Interpretation         A. ASCITIC FLUID , CYTOLOGY AND CELL BLOCK:         No malignant cells identified      Specimen consists of reactive mesothelial and inflammatory cells                   Slide(s) initially screened by [facility] at [facility] [address] [address] [id number]  By the signature on this report, the individual or group listed as making the Final Interpretation/Diagnosis certifies that they have reviewed this case.       Resident Review       The gross and/or microscopic findings were reviewed in conjunction with pathology resident, Devan Montoya MD.        Clinical History       Acute liver failure      Specimen Description       A. ASCITIC FLUID.  Received 600 ml yellow clear fluid with few particles in sterile bottle .         Specimen Processing Detail       A1 Slides Only (No Block)   A1-1 Pap Stain NGYN ThinPrep   A2 Cell Block   A2-1 H&E      CBC and Auto Differential   Result Value Ref Range    WBC 5.8 4.4 - 11.3 x10*3/uL    nRBC 2.1 (H) 0.0 - 0.0 /100 WBCs    RBC 3.23 (L) 4.50 - 5.90 x10*6/uL    Hemoglobin 9.2 (L) 13.5 - 17.5 g/dL    Hematocrit 28.4 (L) 41.0 - 52.0 %    MCV 88 80 - 100 fL    MCH 28.5 26.0 - 34.0 pg    MCHC 32.4 32.0 - 36.0 g/dL    RDW 15.9  (H) 11.5 - 14.5 %    Platelets 87 (L) 150 - 450 x10*3/uL    Immature Granulocytes %, Automated 0.3 0.0 - 0.9 %    Immature Granulocytes Absolute, Automated 0.02 0.00 - 0.70 x10*3/uL   Coagulation Screen   Result Value Ref Range    Protime 15.1 (H) 9.8 - 12.8 seconds    INR 1.3 (H) 0.9 - 1.1    aPTT 25 (L) 27 - 38 seconds   Magnesium   Result Value Ref Range    Magnesium 1.78 1.60 - 2.40 mg/dL   Hepatic function panel   Result Value Ref Range    Albumin 1.8 (L) 3.4 - 5.0 g/dL    Bilirubin, Total 2.7 (H) 0.0 - 1.2 mg/dL    Bilirubin, Direct 1.1 (H) 0.0 - 0.3 mg/dL    Alkaline Phosphatase 590 (H) 33 - 136 U/L    ALT 80 (H) 10 - 52 U/L     (H) 9 - 39 U/L    Total Protein 5.3 (L) 6.4 - 8.2 g/dL   Phosphorus   Result Value Ref Range    Phosphorus 2.3 (L) 2.5 - 4.9 mg/dL   Basic Metabolic Panel   Result Value Ref Range    Glucose 132 (H) 74 - 99 mg/dL    Sodium 137 136 - 145 mmol/L    Potassium 4.3 3.5 - 5.3 mmol/L    Chloride 105 98 - 107 mmol/L    Bicarbonate 21 21 - 32 mmol/L    Anion Gap 15 10 - 20 mmol/L    Urea Nitrogen 5 (L) 6 - 23 mg/dL    Creatinine 0.83 0.50 - 1.30 mg/dL    eGFR >90 >60 mL/min/1.73m*2    Calcium 7.4 (L) 8.6 - 10.6 mg/dL   Manual Differential   Result Value Ref Range    Neutrophils %, Manual 87.8 40.0 - 80.0 %    Lymphocytes %, Manual 4.3 13.0 - 44.0 %    Monocytes %, Manual 6.1 2.0 - 10.0 %    Eosinophils %, Manual 0.0 0.0 - 6.0 %    Basophils %, Manual 0.0 0.0 - 2.0 %    Atypical Lymphocytes %, Manual 1.8 0.0 - 2.0 %    Seg Neutrophils Absolute, Manual 5.09 1.20 - 7.00 x10*3/uL    Lymphocytes Absolute, Manual 0.25 (L) 1.20 - 4.80 x10*3/uL    Monocytes Absolute, Manual 0.35 0.10 - 1.00 x10*3/uL    Eosinophils Absolute, Manual 0.00 0.00 - 0.70 x10*3/uL    Basophils Absolute, Manual 0.00 0.00 - 0.10 x10*3/uL    Atypical Lymphs Absolute, Manual 0.10 0.00 - 0.50 x10*3/uL    Total Cells Counted 115     RBC Morphology See Below     [place] Bodies Present     Pappenheimer Bodies Present          Assessment/Plan      Assessment & Plan  Alcoholic liver failure (Multi)    Acute on chronic alcoholic liver disease (Multi)    Hepatic encephalopathy (Multi)    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 He was continued on lactulose and acetylcysteine on transfer to  MICU. Admission was c/b supratherapeutic INR, hepatic encephalopathy and c/f hemolytic anemia, which have resolved. Pt underwent transjugular liver bx, paracentesis, and hepatic pressure cath on 9/16, c/w portal hypertension. Awaiting liver bx results and pt to follow up with hepatology outpatient.     Updates 9/18:  -Patient oriented; sitter discontinued  -Bladder scans stopped as pt is voiding and hematuria is resolved. Previous hematuria and retention likely d/t high INR and ascites (bladder scan picking up ascitic fluid)  -Hepatology signed off: outpatient hepatology appt w/ Dr. Ansari scheduled for 10/9/2024  -Antimitochondrial Ab negative, anti smooth muscle Ab positive. Peth negative  -Awaiting liver biopsy results  -Awaiting SNF placement      #Acute liver injury/failure, possible acute on chronic liver disease  #HE, resolved   #Coagulopathy, resolved   -Hx of chronic alcohol use disorder  -Patient presents with acute liver injury with INR >9.5 +/- failure given reported encephalopathy with A&Ox2 at OSH. Currently A&Ox4, no asterixis or c/f current encephalopathy though has been treated with lactulose  -Hepatic function panel severely deranged-Alb-1.7,ALP-804,ALT-278,AST-945  -Hepatitis panel, A1AT, Ceruloplasmin,HSV1&2, Acetaminophen level,AFP,Fibrinogen,MICHAEL are all within normal  limits  -Ferritin>9000,serum iron-85 WNL  -EBV DNA<35 detected  -IgG-1600  -AMA-29.3 (uln 24.9, >25 is positive), repeat AMA-9/14 is negative  -liver US shows echogenic appearance of the liver compatible with steatosis and normal Doppler interrogation of the hepatic vasculature. There is mild abdominal ascites and a right pleural effusion   -INR initially as high as 9 on admission, now stable at 1.3  Plan-  -Hepatology signing off, pt to follow up outpatient on 10/9/24  -will follow up on pending hepatic work up  -continue with lactulose 20g TID, titrate prn to 3-4 BM/day   -Monitor Hgb, Coags, signs of bleed  -Daily MELD labs    #Acute anaemia/concern for hemolysis  -Hgb 12.0 to 9.5   - pale but not jaundiced  -Tbili-2.7, indirect-1.4, LDH-846, Haptoglobin<30  -peripheral blood smear-Mild microcytosis with many target cells and many Pappenheimer bodies. Findings consistent with hemoglobinopathy.   -Hb remains stable   Plan-  -monitor Hb     # Traumatic catheterization  -Difficult catheterization at MICU with bleeding.  -patient accidentally urinated without difficulty  Plan-  -Urology consulted; hematuria felt to be due more to supratherapeutic INR than urethral trauma  -Pt voiding on his own; will defer butts or bladder scans unless develops retention     #HFpEF  #Tachycardia  -limited ECHO -9/13 shows EF of 78%  -CXR without signs of pulmonary edema  -Tachycardia resolved  -on home metop 25mg bid     Disposition: pending SNF placement w/ outpatient hepatology follow up       FEN  Fluids: PRN  Electrolytes: K>4, Mg>2  Nutrition: Adult regular diet  Prophylaxis:  DVT ppx: chem ppx held iso coagulopathy, SCDs  GI ppx: PPI  Bowel care: lactulose  Hardware:                        Social:  Code: Full Code    HPOA: Mayela (wife) [phone]  Disposition: Floor       Bronson De Leon MD  Internal Medicine PGY-1

## 2024-09-18 NOTE — PROGRESS NOTES
Occupational Therapy    Evaluation    Patient Name: Manuel Bowser  MRN: [id number]  Department: [facility] 20  [id number]  Today's Date: 9/18/2024  Time Calculation  Start Time: 1530  Stop Time: 1612  Time Calculation (min): 42 min    Assessment  IP OT Assessment  OT Assessment: IMPAIRED PERFORMANCE OF ADL, IADL, FXAL MOBILITY, AND FXAL TF TASKS  Prognosis: Good  Barriers to Discharge: Decreased caregiver support  Evaluation/Treatment Tolerance: Patient limited by fatigue  Medical Staff Made Aware: Yes  End of Session Communication: Bedside nurse, PCT/NA/CTA  End of Session Patient Position: Bed, 3 rail up, Alarm on  Plan:  Treatment Interventions: ADL retraining, Functional transfer training, UE strengthening/ROM, Endurance training, Patient/family training, Cognitive reorientation, Equipment evaluation/education, Compensatory technique education  OT Frequency: 3 times per week  OT Discharge Recommendations: Moderate intensity level of continued care  Equipment Recommended upon Discharge:  (TBD AT NEXT LEVEL OF CARE)  OT Recommended Transfer Status: Moderate assist  OT - OK to Discharge: Yes    Subjective   Current Problem:  1. Alcoholic liver failure (Multi)  Transthoracic Echo (TTE) Limited    Transthoracic Echo (TTE) Limited      2. Encounter for screening for cardiovascular disorders  Transthoracic Echo (TTE) Limited        General:  General  Reason for Referral: Presenting as a transfer from [facility] ICU for workup of acute liver injury vs failure and potential transplant evaluation.  Past Medical History Relevant to Rehab: HFpEF, DM, chronic pancreatitis, HTN, DLD, and alcohol use disorder  Family/Caregiver Present: No  Prior to Session Communication: Bedside nurse  Patient Position Received: Bed, 3 rail up, Alarm on  Preferred Learning Style: verbal, visual  General Comment: RESTING IN BED AT START OF SESSION. DESPITE REPORTS OF FATIGUE HE WAS PLEASANTLY AGREEABLE TO PARTICIPATION WITHIN TOLERANCE.  "PARTICIPATON WAS LIMITED 2/2 FATIGUE. WOULD BENEFIT FROM IP OT TO INCREASE POTENTIAL TO RETURN TO PLOF.  Precautions:  Hearing/Visual Limitations: WFL  Medical Precautions: Fall precautions    Vital Sign (Past 2hrs)        Date/Time Vitals Session Patient Position Pulse Resp SpO2 BP MAP (mmHg)    09/18/24 1530 During OT  --  78  --  100 %  108/77  87                        Pain:  Pain Assessment  Pain Assessment: 0-10  0-10 (Numeric) Pain Score: 0 - No pain    Objective   Cognition:  Overall Cognitive Status: Within Functional Limits (WILL CONTINUE TO MONITOR)  Orientation Level: Oriented X4 (VISUAL CUES ONLY FOR DATE; STATED THE \"20TH\")  Attention: Within Functional Limits    Home Living:  Type of Home: Apartment (GROUND LEVEL)  Lives With: Spouse (PATIENT REPORTS THAT 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 WHO IS BED BOUND)  Home Adaptive Equipment: Walker rolling or standard, Reacher, Wheelchair-power (POWER WC x2)  Home Layout: One level  Home Access: Level entry  Bathroom Shower/Tub: Walk-in shower  Bathroom Toilet: Standard  Bathroom Equipment: Built-in shower seat, Hand-held shower hose  Home Living Comments: MILD FLUCTUATIN IN HOME SET UP REPORT; WILL VERIFY WITH CARE TEAM   Prior Function:  Level of [place]: Independent with ADLs and functional transfers, 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)  Receives Help From:  (ADULT CHILDREN IN THE AREA PROVIDING PRN ASSISTANCE)  IADL History:  Homemaking Responsibilities: Yes  Meal Prep Responsibility: Primary  Laundry Responsibility: Primary  Cleaning Responsibility: Primary  Bill Paying/Finance Responsibility: Primary  Shopping Responsibility: Primary  Occupation: On disability (FORMER )  Leisure and Hobbies: COOKING, TV, SPORTS  ADL:  Eating Assistance: Stand by  Grooming Deficit: Setup  Bathing Assistance: Moderate  Bathing Deficit:  (ANTICIPATED)  UE Dressing Assistance: Moderate  UE Dressing Deficit:  (R/T FATIGUE " AND DECREASED STRENGTH)  LE Dressing Assistance: Maximal  Toileting Assistance with Device: Maximal  Activity Tolerance:  Endurance: Decreased tolerance for upright activites  Bed Mobility/Transfers: Bed Mobility  Bed Mobility: Yes  Bed Mobility 1  Bed Mobility 1: Supine to sitting  Level of Assistance 1: Moderate assistance  Bed Mobility 2  Bed Mobility  2: Sitting to supine  Level of Assistance 2: Moderate assistance  Bed Mobility Comments 2: SCOOTING MAX A    Transfers  Transfer: No (ATTEMPTED/UNABLE)     Sitting Balance:  Static Sitting Balance  Static Sitting-Balance Support: Feet supported, Bilateral upper extremity supported  Static Sitting-Level of Assistance: Close supervision  Static Sitting-Comment/Number of Minutes: ~6 MINUTES  Dynamic Sitting Balance  Dynamic Sitting-Balance Support: Right upper extremity supported, Left upper extremity supported, Feet supported  Dynamic Sitting-Level of Assistance: Contact guard  Dynamic Sitting-Balance: Lateral lean     IADL's:   Homemaking Responsibilities: Yes  Meal Prep Responsibility: Primary  Laundry Responsibility: Primary  Cleaning Responsibility: Primary  Bill Paying/Finance Responsibility: Primary  Shopping Responsibility: Primary  Occupation: On disability (FORMER )  Leisure and Hobbies: COOKING, TV, SPORTS    Sensation:  Light Touch: No apparent deficits  Strength:  Strength Comments: BUE FUNCTIONALLY 3+/5      Hand Function:  Hand Function  Gross Grasp: Functional  Coordination: Functional  Extremities: RUE   RUE : Within Functional Limits and LUE   LUE: Within Functional Limits    Outcome Measures: [facility] Daily Activity  Putting on and taking off regular lower body clothing: A lot  Bathing (including washing, rinsing, drying): A lot  Putting on and taking off regular upper body clothing: A lot  Toileting, which includes using toilet, bedpan or urinal: A lot  Taking care of personal grooming such as brushing teeth: A little  Eating Meals: A  little  Daily Activity - Total Score: 14         and Brief Confusion Assessment Method (bCAM)  CAM Result: Unable to assess  Education Documentation  No documentation found.  Education Comments  No comments found.      Goals:   Encounter Problems       Encounter Problems (Active)       ADLs       Patient with complete upper body dressing with modified independent level of assistance donning and doffing all UE clothes with PRN adaptive equipment        Start:  09/18/24    Expected End:  10/02/24            Patient with complete lower body dressing with modified independent level of assistance donning and doffing all LE clothes  with PRN adaptive equipment        Start:  09/18/24    Expected End:  10/02/24            Patient will complete daily grooming tasks brushing teeth and washing face/hair with modified independent level of assistance and PRN adaptive equipment        Start:  09/18/24    Expected End:  10/02/24            Patient will complete toileting including hygiene clothing management/hygiene with modified independent level of assistance .       Start:  09/18/24    Expected End:  10/02/24               TRANSFERS       Patient will perform bed mobility modified independent level of assistance and bed rails and draw sheet in order to improve safety and independence with mobility       Start:  09/18/24    Expected End:  10/02/24            Patient will complete functional transfer with least restrictive device with modified independent level of assistance.       Start:  09/18/24    Expected End:  10/02/24

## 2024-09-18 NOTE — CONSULTS
"  [facility] [place]  Consult Note    Manuel Bowser  [id number]  1960 (63 y.o.)  Reason for Consult:  Urinary retention, butts placement    HPI: 63 y.o. male w/PMHx most notable for HFpEF, DM, chronic pancreatitis, HTN, DLD, AUD, who initially presented with as transfer from [facility] ICU for acute liver failure. Urology consulted for evaluation and management recommendations for suspected urinary retention and butts placement.    Patient has been voiding spontaneously the past several days. He was bladder scanned this evening for 310 ml's. Of note, patient has ascites and underwent paracentesis with 600cc's on 9/16. Patient diaper was wet with urine when seen this evening. Furthermore, he states that he is able to void on his own and denies any suprapubic fulllness or inability to empty his bladder.    There is a note of \"traumatic catheterization\" in the ICU due to initial butts placed on 9/11 draining red urine. OF note, patient's INR was rapidly increasing to 9.5 on 9/12 during that time, and patient has BPH based on previous CT scan report.        ROS:  As per HPI, 10 point comprehensive review of systems reviewed and otherwise negative.    No Known Allergies    No past medical history on file.  No past surgical history on file.  Social History     Socioeconomic History    Marital status: Unknown     Spouse name: Not on file    Number of children: Not on file    Years of education: Not on file    Highest education level: Not on file   Occupational History    Not on file   Tobacco Use    Smoking status: Never    Smokeless tobacco: Never   Substance and Sexual Activity    Alcohol use: Not on file    Drug use: Not on file    Sexual activity: Not on file   Other Topics Concern    Not on file   Social History Narrative    Not on file     Social Determinants of Health     Financial Resource Strain: Low Risk  (9/13/2024)    Overall Financial Resource Strain (CARDIA)     Difficulty of Paying Living Expenses: Not hard " at all   Food Insecurity: No Food Insecurity (9/13/2024)    Hunger Vital Sign     Worried About Running Out of Food in the Last Year: Never true     Ran Out of Food in the Last Year: Never true   Transportation Needs: No Transportation Needs (9/13/2024)    PRAPARE - Transportation     Lack of Transportation (Medical): No     Lack of Transportation (Non-Medical): No   Physical Activity: Not on file   Stress: Not on file   Social Connections: Not on file   Intimate Partner Violence: Not At Risk (9/13/2024)    Humiliation, Afraid, Rape, and Kick questionnaire     Fear of Current or Ex-Partner: No     Emotionally Abused: No     Physically Abused: No     Sexually Abused: No   Housing Stability: Low Risk  (9/13/2024)    Housing Stability Vital Sign     Unable to Pay for Housing in the Last Year: No     Number of Times Moved in the Last Year: 0     Homeless in the Last Year: No     No family history on file.  Current Facility-Administered Medications   Medication Dose Route Frequency Provider Last Rate Last Admin    pantoprazole (ProtoNix) EC tablet 40 mg  40 mg oral Daily before breakfast Ortiz Julian MD   40 mg at 09/17/24 0845    Or    esomeprazole (NexIUM) suspension 40 mg  40 mg nasoduodenal tube Daily before breakfast Ortiz Julian MD        Or    pantoprazole (ProtoNix) injection 40 mg  40 mg intravenous Daily before breakfast Ortiz Julian MD   40 mg at 09/16/24 0517    HYDROmorphone (Dilaudid) injection 0.2 mg  0.2 mg intravenous q4h PRN Ortiz Julian MD   0.2 mg at 09/17/24 2220    lactulose 20 gram/30 mL oral solution 20 g  20 g oral TID Ortiz Julian MD   20 g at 09/17/24 2219    levothyroxine (Synthroid, Levoxyl) tablet 125 mcg  125 mcg oral Daily Ortiz Julian MD   125 mcg at 09/17/24 0620    lidocaine 2 % mucosal jelly (Uro-Jet) 1 Application  1 Application urethral Once Ortiz Freeman MD        lidocaine 4 % patch 1 patch  1 patch transdermal Daily Ortiz Julian MD    1 patch at 09/17/24 0845    melatonin tablet 6 mg  6 mg oral Daily Ortiz Julian MD   6 mg at 09/17/24 2215    metoprolol tartrate (Lopressor) tablet 25 mg  25 mg oral BID Ortiz Julian MD   25 mg at 09/17/24 2219         Labs & Test Results  No intake/output data recorded.    @72HRCBC@  @72HRBMP@  [unfilled]  [unfilled]      Physical Exam     Gen -  No acute distress     Neuro - Alert, oriented to person, month/year, and city, slightly confused     CV -  Regular rate     Pulm - Symmetric chest rise, non-labored breathing     Abd - Soft, non-tender, non-distended,      Ext - Warm, well perfused     Skin - without jaunice       Psych - appropriate tone, affect      - no suprapubic pain or distention. No CVA tenderness. Diaper soaked with urine.      Imaging  IR biopsy abdomen liver IMPRESSION  IMPRESSION:  1.  Uncomplicated and technically successful transjugular liver  biopsy (x2 18 gauge core biopsies).  2. Free right hepatic vein: 2 mm Hg. wedged right hepatic vein: 9 mm  Hg. Right atrium: 1 mm Hg  3. Paracentesis with 600 mL ascitic fluid removed. Sent for labs.    US liver with doppler  IMPRESSION:  1. Biliary sludge with mild diffuse thickening of the gallbladder  wall, likely related to ascites. Otherwise the gallbladder is  nondilated without cholelithiasis or sonographic evidence of acute  cholecystitis.  2. Echogenic appearance of the liver compatible with steatosis.  3. Normal Doppler interrogation of the hepatic vasculature.  4. Mild abdominal ascites and a right pleural effusion.        Impression & Recommendations   63 y.o. male w/PMHx most notable for HFpEF, DM, chronic pancreatitis, HTN, DLD, AUD, who initially presented with as transfer from [facility] ICU for acute liver failure. Urology consulted for evaluation and management recommendations for suspected urinary retention and butts placement.    Patient has been voiding spontaneously the past several days. He was bladder scanned  "this evening for 310 ml's. Of note, patient has ascites and underwent paracentesis with 600cc's on 9/16. Patient diaper was wet with urine when seen this evening. Furthermore, he states that he is able to void on his own and denies any suprapubic fulllness or inability to empty his bladder.    There is a note of \"traumatic catheterization\" in the ICU due to initial butts placed on 9/11 draining red urine. Of note, patient's INR was rapidly increasing to 9.5 on 9/12 during that time, and patient has BPH based on previous CT scan report.      -please note that bladder scans are unreliable in the setting of ascites. Patient has been voiding in diaper, although not all voids are being documented. Advise going off of patient symptoms to determine if in retention rather than unreliable bladder scan  - no contraindication to nursing staff catheterization if patient truly begins retaining urine. Previous hematuria more likely due to extremely high INR and BPH rather than permanent urethral trauma  - if patient develops urinary retention, nursing or primary team to attempt catheter placement with 18F coude butts catheter   - urology available to assist if coude catheter placement is unsuccessful      Patient assessment and plan to be discussed with Dr. Kylie Freeman MD   Urology PGY-3  Adult Urology Pager: [phone]   Pediatric Urology: [phone]              "

## 2024-09-18 NOTE — CARE PLAN
Problem: Fall/Injury  Goal: Not fall by end of shift  Outcome: Progressing  Goal: Be free from injury by end of the shift  Outcome: Progressing  Goal: Verbalize understanding of personal risk factors for fall in the hospital  Outcome: Progressing     Problem: Pain  Goal: Takes deep breaths with improved pain control throughout the shift  Outcome: Progressing   The patient's goals for the shift include Maintain patient safety by the end of the shift    The clinical goals for the shift include safety and free from falls

## 2024-09-18 NOTE — PROGRESS NOTES
Per medical team pt medically ready today. TCC attempted to call pt's spouse, Mayela but there was no answer and unable to leave a VM message. TCC spoke to pt's sister, Yarelis and made aware both FOCs unable to accept pt. Per Yarelis she will review SNF list with her other sister, Chelsea and for more preferences and TCC made aware will follow up with her later today and she verbalized her understanding.   1525-Attempted to call pt's sister, Yarelis back for additional SNF preferences but there was no answer. Left a VM message with TCC info. Will update medical team.

## 2024-09-19 LAB
ALBUMIN SERPL BCP-MCNC: 1.9 G/DL (ref 3.4–5)
ALP SERPL-CCNC: 554 U/L (ref 33–136)
ALT SERPL W P-5'-P-CCNC: 71 U/L (ref 10–52)
ANION GAP SERPL CALC-SCNC: 12 MMOL/L (ref 10–20)
APTT PPP: 29 SECONDS (ref 27–38)
AST SERPL W P-5'-P-CCNC: 82 U/L (ref 9–39)
ATRIAL RATE: 114 BPM
ATRIAL RATE: 85 BPM
BACTERIA FLD CULT: NORMAL
BASOPHILS # BLD AUTO: 0 X10*3/UL (ref 0–0.1)
BASOPHILS NFR BLD AUTO: 0 %
BILIRUB DIRECT SERPL-MCNC: 1.5 MG/DL (ref 0–0.3)
BILIRUB SERPL-MCNC: 3.2 MG/DL (ref 0–1.2)
BUN SERPL-MCNC: 5 MG/DL (ref 6–23)
CALCIUM SERPL-MCNC: 7.6 MG/DL (ref 8.6–10.6)
CHLORIDE SERPL-SCNC: 104 MMOL/L (ref 98–107)
CO2 SERPL-SCNC: 25 MMOL/L (ref 21–32)
CREAT SERPL-MCNC: 0.9 MG/DL (ref 0.5–1.3)
EGFRCR SERPLBLD CKD-EPI 2021: >90 ML/MIN/1.73M*2
EOSINOPHIL # BLD AUTO: 0.01 X10*3/UL (ref 0–0.7)
EOSINOPHIL NFR BLD AUTO: 0.2 %
ERYTHROCYTE [DISTWIDTH] IN BLOOD BY AUTOMATED COUNT: 16.2 % (ref 11.5–14.5)
GLUCOSE SERPL-MCNC: 144 MG/DL (ref 74–99)
GRAM STN SPEC: NORMAL
GRAM STN SPEC: NORMAL
HCT VFR BLD AUTO: 30.8 % (ref 41–52)
HGB BLD-MCNC: 10 G/DL (ref 13.5–17.5)
IMM GRANULOCYTES # BLD AUTO: 0.05 X10*3/UL (ref 0–0.7)
IMM GRANULOCYTES NFR BLD AUTO: 0.8 % (ref 0–0.9)
INR PPP: 1.4 (ref 0.9–1.1)
LYMPHOCYTES # BLD AUTO: 1.19 X10*3/UL (ref 1.2–4.8)
LYMPHOCYTES NFR BLD AUTO: 20 %
MAGNESIUM SERPL-MCNC: 1.67 MG/DL (ref 1.6–2.4)
MCH RBC QN AUTO: 28.5 PG (ref 26–34)
MCHC RBC AUTO-ENTMCNC: 32.5 G/DL (ref 32–36)
MCV RBC AUTO: 88 FL (ref 80–100)
MONOCYTES # BLD AUTO: 0.61 X10*3/UL (ref 0.1–1)
MONOCYTES NFR BLD AUTO: 10.3 %
NEUTROPHILS # BLD AUTO: 4.08 X10*3/UL (ref 1.2–7.7)
NEUTROPHILS NFR BLD AUTO: 68.7 %
NRBC BLD-RTO: 4.5 /100 WBCS (ref 0–0)
P AXIS: 54 DEGREES
P AXIS: 81 DEGREES
P OFFSET: 208 MS
P OFFSET: 212 MS
P ONSET: 160 MS
P ONSET: 174 MS
PHOSPHATE SERPL-MCNC: 2.4 MG/DL (ref 2.5–4.9)
PLATELET # BLD AUTO: 99 X10*3/UL (ref 150–450)
POTASSIUM SERPL-SCNC: 4 MMOL/L (ref 3.5–5.3)
PR INTERVAL: 100 MS
PR INTERVAL: 124 MS
PROT SERPL-MCNC: 5.6 G/DL (ref 6.4–8.2)
PROTHROMBIN TIME: 16 SECONDS (ref 9.8–12.8)
Q ONSET: 222 MS
Q ONSET: 224 MS
QRS COUNT: 14 BEATS
QRS COUNT: 19 BEATS
QRS DURATION: 64 MS
QRS DURATION: 68 MS
QT INTERVAL: 318 MS
QT INTERVAL: 404 MS
QTC CALCULATION(BAZETT): 438 MS
QTC CALCULATION(BAZETT): 480 MS
QTC FREDERICIA: 393 MS
QTC FREDERICIA: 453 MS
R AXIS: 21 DEGREES
R AXIS: 25 DEGREES
RBC # BLD AUTO: 3.51 X10*6/UL (ref 4.5–5.9)
SODIUM SERPL-SCNC: 137 MMOL/L (ref 136–145)
T AXIS: 27 DEGREES
T AXIS: 28 DEGREES
T OFFSET: 381 MS
T OFFSET: 426 MS
VENTRICULAR RATE: 114 BPM
VENTRICULAR RATE: 85 BPM
WBC # BLD AUTO: 5.9 X10*3/UL (ref 4.4–11.3)

## 2024-09-19 PROCEDURE — 2500000001 HC RX 250 WO HCPCS SELF ADMINISTERED DRUGS (ALT 637 FOR MEDICARE OP)

## 2024-09-19 PROCEDURE — 82248 BILIRUBIN DIRECT: CPT

## 2024-09-19 PROCEDURE — 99231 SBSQ HOSP IP/OBS SF/LOW 25: CPT

## 2024-09-19 PROCEDURE — 97530 THERAPEUTIC ACTIVITIES: CPT | Mod: GP

## 2024-09-19 PROCEDURE — 85025 COMPLETE CBC W/AUTO DIFF WBC: CPT

## 2024-09-19 PROCEDURE — 97535 SELF CARE MNGMENT TRAINING: CPT | Mod: GO

## 2024-09-19 PROCEDURE — 2500000005 HC RX 250 GENERAL PHARMACY W/O HCPCS

## 2024-09-19 PROCEDURE — 83735 ASSAY OF MAGNESIUM: CPT

## 2024-09-19 PROCEDURE — 84100 ASSAY OF PHOSPHORUS: CPT

## 2024-09-19 PROCEDURE — 85610 PROTHROMBIN TIME: CPT

## 2024-09-19 PROCEDURE — 36415 COLL VENOUS BLD VENIPUNCTURE: CPT

## 2024-09-19 PROCEDURE — 1100000001 HC PRIVATE ROOM DAILY

## 2024-09-19 PROCEDURE — 97530 THERAPEUTIC ACTIVITIES: CPT | Mod: GO

## 2024-09-19 PROCEDURE — 2500000004 HC RX 250 GENERAL PHARMACY W/ HCPCS (ALT 636 FOR OP/ED)

## 2024-09-19 ASSESSMENT — COGNITIVE AND FUNCTIONAL STATUS - GENERAL
MOBILITY SCORE: 15
MOVING FROM LYING ON BACK TO SITTING ON SIDE OF FLAT BED WITH BEDRAILS: A LITTLE
TOILETING: A LOT
WALKING IN HOSPITAL ROOM: TOTAL
CLIMB 3 TO 5 STEPS WITH RAILING: TOTAL
DRESSING REGULAR UPPER BODY CLOTHING: A LITTLE
TURNING FROM BACK TO SIDE WHILE IN FLAT BAD: A LOT
PERSONAL GROOMING: A LITTLE
STANDING UP FROM CHAIR USING ARMS: A LOT
MOVING FROM LYING ON BACK TO SITTING ON SIDE OF FLAT BED WITH BEDRAILS: A LITTLE
DAILY ACTIVITIY SCORE: 19
TOILETING: A LITTLE
DRESSING REGULAR LOWER BODY CLOTHING: A LITTLE
HELP NEEDED FOR BATHING: A LITTLE
MOVING TO AND FROM BED TO CHAIR: A LITTLE
CLIMB 3 TO 5 STEPS WITH RAILING: A LOT
DAILY ACTIVITIY SCORE: 19
DRESSING REGULAR LOWER BODY CLOTHING: A LOT
HELP NEEDED FOR BATHING: A LOT
STANDING UP FROM CHAIR USING ARMS: A LITTLE
HELP NEEDED FOR BATHING: A LITTLE
MOVING FROM LYING ON BACK TO SITTING ON SIDE OF FLAT BED WITH BEDRAILS: A LOT
TURNING FROM BACK TO SIDE WHILE IN FLAT BAD: A LOT
DAILY ACTIVITIY SCORE: 14
DRESSING REGULAR UPPER BODY CLOTHING: A LITTLE
TURNING FROM BACK TO SIDE WHILE IN FLAT BAD: A LOT
STANDING UP FROM CHAIR USING ARMS: A LITTLE
PERSONAL GROOMING: A LITTLE
WALKING IN HOSPITAL ROOM: A LOT
DRESSING REGULAR UPPER BODY CLOTHING: A LOT
MOVING TO AND FROM BED TO CHAIR: TOTAL
MOBILITY SCORE: 9
DRESSING REGULAR LOWER BODY CLOTHING: A LITTLE
TOILETING: A LITTLE
MOBILITY SCORE: 15
EATING MEALS: A LITTLE
PERSONAL GROOMING: A LITTLE
MOVING TO AND FROM BED TO CHAIR: A LITTLE
WALKING IN HOSPITAL ROOM: A LOT
CLIMB 3 TO 5 STEPS WITH RAILING: A LOT

## 2024-09-19 ASSESSMENT — PAIN SCALES - GENERAL
PAINLEVEL_OUTOF10: 8
PAINLEVEL_OUTOF10: 8
PAINLEVEL_OUTOF10: 0 - NO PAIN
PAINLEVEL_OUTOF10: 8
PAINLEVEL_OUTOF10: 8
PAINLEVEL_OUTOF10: 4
PAINLEVEL_OUTOF10: 3
PAINLEVEL_OUTOF10: 8

## 2024-09-19 ASSESSMENT — PAIN SCALES - PAIN ASSESSMENT IN ADVANCED DEMENTIA (PAINAD): TOTALSCORE: MEDICATION (SEE MAR)

## 2024-09-19 ASSESSMENT — PAIN - FUNCTIONAL ASSESSMENT
PAIN_FUNCTIONAL_ASSESSMENT: 0-10

## 2024-09-19 ASSESSMENT — PAIN DESCRIPTION - LOCATION
LOCATION: ABDOMEN

## 2024-09-19 ASSESSMENT — PAIN SCALES - WONG BAKER: WONGBAKER_NUMERICALRESPONSE: HURTS EVEN MORE

## 2024-09-19 ASSESSMENT — ACTIVITIES OF DAILY LIVING (ADL): HOME_MANAGEMENT_TIME_ENTRY: 16

## 2024-09-19 ASSESSMENT — PAIN DESCRIPTION - ORIENTATION: ORIENTATION: MID

## 2024-09-19 NOTE — PROGRESS NOTES
"Occupational Therapy    Occupational Therapy Treatment    Name: Manuel Bowser  MRN: [id number]  Department: [facility] 20  Room: 2067/2067  Date: 09/19/24  Time Calculation  Start Time: 1517  Stop Time: 1549  Time Calculation (min): 32 min    Assessment:  Medical Staff Made Aware: Yes  End of Session Communication: PCT/NA/CTA, Bedside nurse  End of Session Patient Position: Bed, 3 rail up, Alarm on  Plan:  Treatment Interventions: ADL retraining, Functional transfer training, UE strengthening/ROM, Endurance training, Patient/family training, Cognitive reorientation, Equipment evaluation/education, Compensatory technique education  OT Frequency: 3 times per week  OT Discharge Recommendations: Moderate intensity level of continued care  Equipment Recommended upon Discharge:  (TBD AT NEXT LEVEL OF CARE)  OT Recommended Transfer Status: Moderate assist  OT - OK to Discharge: Yes    Subjective   Previous Visit Info:  OT Last Visit  OT Received On: 09/19/24  General:  General  Reason for Referral: Presenting as a transfer from [facility] ICU for workup of acute liver injury vs failure and potential transplant evaluation.  Past Medical History Relevant to Rehab: HFpEF, DM, chronic pancreatitis, HTN, DLD, and alcohol use disorder  Family/Caregiver Present: No  Prior to Session Communication: Bedside nurse  Patient Position Received: Bed, 3 rail up, Alarm on  Preferred Learning Style: verbal, visual  General Comment: UPON APPROACH PATIENT WAS SUPINE, EATING IN BED. HE WAS EDUCATED FOR BENEFITS OF INCREASING HIS TIME AT EOB/OOB FOR ADLS. HE AGREED TO PARTICIPATE AND TO DO \"THE BEST THAT I CAN.\"  Precautions:  Medical Precautions: Fall precautions    Vital Signs (Past 2hrs)        Date/Time Vitals Session Patient Position Pulse Resp SpO2 BP MAP (mmHg)    09/19/24 1517 During OT  --  77  --  98 %  112/80  --                        Pain Assessment:  Pain Assessment  Pain Assessment: 0-10  0-10 (Numeric) Pain Score: 8  Pain Location: " "Abdomen  Pain Orientation: Mid     Objective   Cognition:  Overall Cognitive Status: Within Functional Limits (WILL CONTINUE TO MONITOR)  Orientation Level: Oriented X4 (VISUAL CUES ONLY FOR DATE; STATED THE \"20TH\")  Attention: Within Functional Limits  Activities of Daily Living: Feeding  Feeding Level of Assistance: Setup  Feeding Where Assessed: Bed level    LE Dressing  Pants Level of Assistance: Maximum assistance  LE Dressing Where Assessed: Edge of bed    Toileting  Toileting Level of Assistance: Maximum assistance  Where Assessed: Bed level    Bed Mobility/Transfers: Bed Mobility  Bed Mobility: Yes  Bed Mobility 1  Bed Mobility 1: Supine to sitting, Sitting to supine  Level of Assistance 1: Moderate assistance, Moderate verbal cues, Moderate tactile cues  Bed Mobility Comments 1: BED RAIL + HOB ELEVATED    Transfers  Transfer: Yes  Transfer 1  Transfer From 1: Sit to, Stand to  Transfer to 1: Stand, Sit  Transfer Device 1: Walker  Transfer Level of Assistance 1: Moderate assistance, Minimal verbal cues    Sitting Balance:  Static Sitting Balance  Static Sitting-Balance Support: Bilateral upper extremity supported, Feet supported  Static Sitting-Level of Assistance: Close supervision  Static Sitting-Comment/Number of Minutes: ~6 MINUTES    Outcome Measures:  [facility] Daily Activity  Putting on and taking off regular lower body clothing: A lot  Bathing (including washing, rinsing, drying): A lot  Putting on and taking off regular upper body clothing: A lot  Toileting, which includes using toilet, bedpan or urinal: A lot  Taking care of personal grooming such as brushing teeth: A little  Eating Meals: A little  Daily Activity - Total Score: 14    Education Documentation  Precautions, taught by Inna eLe OT at 9/19/2024  4:27 PM.  Learner: Patient  Readiness: Eager  Method: Explanation  Response: Verbalizes Understanding    ADL Training, taught by Inna Lee OT at 9/19/2024  4:27 PM.  Learner: " Patient  Readiness: Eager  Method: Explanation  Response: Verbalizes Understanding    Education Comments  No comments found.      Goals:  Encounter Problems       Encounter Problems (Active)       ADLs       Patient with complete upper body dressing with modified independent level of assistance donning and doffing all UE clothes with PRN adaptive equipment        Start:  09/18/24    Expected End:  10/02/24            Patient with complete lower body dressing with modified independent level of assistance donning and doffing all LE clothes  with PRN adaptive equipment        Start:  09/18/24    Expected End:  10/02/24            Patient will complete daily grooming tasks brushing teeth and washing face/hair with modified independent level of assistance and PRN adaptive equipment        Start:  09/18/24    Expected End:  10/02/24            Patient will complete toileting including hygiene clothing management/hygiene with modified independent level of assistance .       Start:  09/18/24    Expected End:  10/02/24               TRANSFERS       Patient will perform bed mobility modified independent level of assistance and bed rails and draw sheet in order to improve safety and independence with mobility       Start:  09/18/24    Expected End:  10/02/24            Patient will complete functional transfer with least restrictive device with modified independent level of assistance.       Start:  09/18/24    Expected End:  10/02/24

## 2024-09-19 NOTE — PROGRESS NOTES
Manuel Bowser is a 63 y.o. male on day 6 of admission presenting with Alcoholic liver failure (Multi).      Subjective   No acute events overnight.    Patient was seen and examined at bedside this AM. On evaluation, he denies any new concerns or symptoms.     He denies any pain, fever, chills, n/v, constipation, diarrhea.     Objective     Last Recorded Vitals  /82   Pulse 80   Temp 36.5 °C (97.7 °F)   Resp 18   Wt 73.3 kg (161 lb 9.6 oz)   SpO2 95%   Intake/Output last 3 Shifts:  No intake or output data in the 24 hours ending 09/19/24 0835      Admission Weight  Weight: 67.5 kg (148 lb 13 oz) (09/13/24 0100)    Daily Weight  09/19/24 : 73.3 kg (161 lb 9.6 oz)    Image Results      Physical Exam  Constitutional:       General: He is not in acute distress. A&O x4. Has somewhat flat affect, makes seldom eye contact     Appearance: He is not diaphoretic.   HENT:      Head: Normocephalic.      Mouth/Throat:      Mouth: Mucous membranes are moist.   Cardiovascular:      Rate and Rhythm: Normal rate and regular rhythm.      Heart sounds: No murmur heard.  Pulmonary:      Effort: No respiratory distress.      Breath sounds: No rhonchi or rales.   Abdominal:      General: slightly distended abdomen       Palpations: There is no mass.      Tenderness: There is no abdominal tenderness. There is no guarding or rebound.   Musculoskeletal:         General: No swelling. No asterixis.      Right lower leg: No edema.      Left lower leg: No edema.   Skin:     Capillary Refill: Capillary refill takes less than 2 seconds.      Coloration: Skin is pale. Skin is not jaundiced.      Findings: No bruising.      Right jugular   Neurological:      General: No focal deficit present.      Mental Status: He is alert and oriented to person, place, and time.        Relevant Results  No results found for this or any previous visit (from the past 24 hour(s)).       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 ICU for workup of acute liver failure. He was continued on lactulose and acetylcysteine on transfer to  MICU. Admission was c/b supratherapeutic INR, hepatic encephalopathy and c/f hemolytic anemia, which have resolved. Pt underwent transjugular liver bx, paracentesis, and hepatic pressure cath on 9/16, c/w portal hypertension. Awaiting liver bx results and pt to follow up with hepatology outpatient.     Updates 9/19:  -Hepatology signed off: outpatient hepatology appt w/ Dr. Ansari scheduled for 10/9/2024  -Antimitochondrial Ab negative, anti smooth muscle Ab positive. Peth negative  -Awaiting liver biopsy results  -Awaiting SNF placement    #Acute liver injury/failure, possible acute on chronic liver disease  #HE, resolved   #Coagulopathy, resolved   -Hx of chronic alcohol use disorder  -Patient presents with acute liver injury with INR >9.5 +/- failure given reported encephalopathy with A&Ox2 at OSH. Currently A&Ox4, no asterixis or c/f current encephalopathy though has been treated with lactulose  -Hepatic function panel severely deranged-Alb-1.7,ALP-804,ALT-278,AST-945  -Hepatitis panel, A1AT, Ceruloplasmin,HSV1&2, Acetaminophen level,AFP,Fibrinogen,MICHAEL are all within normal limits  -Ferritin>9000,serum iron-85 WNL  -EBV DNA<35 detected  -IgG-1600  -AMA-29.3 (uln 24.9, >25 is positive), repeat AMA-9/14 is negative  -liver US shows echogenic appearance of the liver compatible with steatosis and normal Doppler interrogation of the hepatic vasculature. There is mild  abdominal ascites and a right pleural effusion   -INR initially as high as 9 on admission, now stable at 1.3  Plan-  -Hepatology signing off, pt to follow up outpatient on 10/9/24  -will follow up on pending hepatic work up  -continue with lactulose 20g TID, titrate prn to 3-4 BM/day   -Monitor Hgb, Coags, signs of bleed  -Daily MELD labs    #Acute anaemia/concern for hemolysis  -Hgb 12.0 to 9.5   - pale but not jaundiced  -Tbili-2.7, indirect-1.4, LDH-846, Haptoglobin<30  -peripheral blood smear-Mild microcytosis with many target cells and many Pappenheimer bodies. Findings consistent with hemoglobinopathy.   -Hb remains stable   Plan-  -monitor Hb     # Traumatic catheterization  -Difficult catheterization at MICU with bleeding.  -patient accidentally urinated without difficulty  Plan-  -Urology consulted; hematuria felt to be due more to supratherapeutic INR than urethral trauma  -Pt voiding on his own; will defer butts or bladder scans unless develops retention     #HFpEF  #Tachycardia  -limited ECHO -9/13 shows EF of 78%  -CXR without signs of pulmonary edema  -Tachycardia resolved  -on home metop 25mg bid     Disposition: pending SNF placement w/ outpatient hepatology follow up       FEN  Fluids: PRN  Electrolytes: K>4, Mg>2  Nutrition: Adult regular diet  Prophylaxis:  DVT ppx: chem ppx held iso coagulopathy, SCDs  GI ppx: PPI  Bowel care: lactulose  Hardware:                        Social:  Code: Full Code    HPOA: Mayela (wife) [phone]  Disposition: Floor    Patient seen and discussed with attending physician, Dr. Wise.     Adia Nguyen MD  Internal Medicine PGY-1

## 2024-09-19 NOTE — PROGRESS NOTES
TCC spoke to pt's sister, Yarelis that stated she was not able to download SNF list. TCC resent SNF list to sister's phone to review. Pt's sister ok with mass SNF referral being sent out to see if anyone able to accept. Will continue to follow.   1458-TCC reviewed accepting SNFs with pt's sister, Yarelis and FOC is [facility] SNF. TCC requested auth to be started. Medical team updated.

## 2024-09-19 NOTE — CARE PLAN
The patient's goals for the shift include Pain control to 4-6 by the end of the shift    The clinical goals for the shift include Plan for discharge to SNF once choice is given to CM    Over the shift, the patient did not make progress toward the following goals. Barriers to progression include   . Recommendations to address these barriers include   .

## 2024-09-19 NOTE — PROGRESS NOTES
Physical Therapy    Physical Therapy Treatment    Patient Name: Manuel Bowser  MRN: [id number]  Department: [place] [address]  Room: [id number]  Today's Date: 9/19/2024  Time Calculation  Start Time: 0936  Stop Time: 0959  Time Calculation (min): 23 min       Assessment/Plan   PT Assessment  Assessment Comment: Continue to recommend MOD intensity PT after DC.  End of Session Patient Position: Bed, 3 rail up, Alarm on     PT Plan  Treatment/Interventions: Bed mobility, Transfer training, Gait training, Balance training, Neuromuscular re-education, Strengthening, Endurance training, Therapeutic exercise, Therapeutic activity, Home exercise program, Positioning, Postural re-education  PT Plan: Ongoing PT  PT Frequency: 3 times per week  PT Discharge Recommendations: Moderate intensity level of continued care  PT Recommended Transfer Status: Assist x2, Assistive device  PT - OK to Discharge: Yes (PT eval complete and DC rec made)    General Visit Information:   PT  Visit  PT Received On: 09/19/24  Response to Previous Treatment: Patient with no complaints from previous session.  General  Patient Position Received: Bed, 3 rail up, Alarm on  General Comment: Pt supine in bed upon entry to room. Pt cooperative and willing to work with PT.    Subjective   Precautions:  Precautions  Medical Precautions: Fall precautions    Objective   Pain:  Pain Assessment  Pain Assessment: 0-10  0-10 (Numeric) Pain Score:  (unrated abdominal pain)  Cognition:  Cognition  Overall Cognitive Status: Impaired  Arousal/Alertness: Delayed responses to stimuli  Orientation Level: Oriented X4  Following Commands:  (increased time to respond to commands)  Cognition Comments: pt with overall flat affect, increased response time to commands  Coordination:     Postural Control:  Static Sitting Balance  Static Sitting-Level of Assistance: Close supervision  Dynamic Sitting Balance  Dynamic Sitting-Level of Assistance: Close supervision  Static Standing  Balance  Static Standing-Level of Assistance: Minimum assistance  Dynamic Standing Balance  Dynamic Standing-Level of Assistance: Moderate assistance    Treatments:  Therapeutic Activity  Therapeutic Activity Performed: Yes  Therapeutic Activity 1: pt sitting EOB for ~10 minutes with B feet supported and sba. Pt instructed to complete incentive spriometry, with pt demonstrating correct use with max verbal cuing.    Bed Mobility  Bed Mobility: Yes  Bed Mobility 1  Bed Mobility 1: Supine to sitting, Sitting to supine  Level of Assistance 1: Moderate assistance, Moderate verbal cues, Moderate tactile cues  Bed Mobility Comments 1: HOB partially elevated; verbal cuing to complete    Ambulation/Gait Training  Ambulation/Gait Training Performed: Yes  Ambulation/Gait Training 1  Surface 1: Level tile  Device 1: Rolling walker  Assistance 1: Moderate assistance, Moderate verbal cues, Moderate tactile cues  Quality of Gait 1:  (increased BUE support on fww; flexed trunk)  Comments/Distance (ft) 1: 2ft lateral side steps  Transfers  Transfer: Yes  Transfer 1  Technique 1: Sit to stand, Stand to sit  Transfer Device 1: Walker  Transfer Level of Assistance 1: Maximum assistance, Maximum verbal cues, Maximum tactile cues  Trials/Comments 1: x2 trials to complete stand transfer, bed elevated    Outcome Measures:  [facility] Basic Mobility  Turning from your back to your side while in a flat bed without using bedrails: A lot  Moving from lying on your back to sitting on the side of a flat bed without using bedrails: A lot  Moving to and from bed to chair (including a wheelchair): Total  Standing up from a chair using your arms (e.g. wheelchair or bedside chair): A lot  To walk in hospital room: Total  Climbing 3-5 steps with railing: Total  Basic Mobility - Total Score: 9    Education Documentation  Mobility Training, taught by Vicki Jacome PT at 9/19/2024 12:59 PM.  Learner: Patient  Readiness: Acceptance  Method:  Explanation  Response: Verbalizes Understanding, Needs Reinforcement  Comment: importance of funtional mobility    Education Comments  No comments found.      OP EDUCATION:       Encounter Problems       Encounter Problems (Active)       Mobility       Pt will perform bed mobility with cga assist.   (Progressing)       Start:  09/17/24    Expected End:  10/01/24            Pt will ambulate >50ft with LRAD and cga Assist with no LOB and VSS.   (Progressing)       Start:  09/17/24    Expected End:  10/01/24            Pt will demonstrate WFL BLE strength to complete therapeutic exercise and participate in functional mobility.   (Progressing)       Start:  09/17/24    Expected End:  10/01/24               PT Transfers       Pt will perform all functional transfers with LRAD and cga assist.   (Progressing)       Start:  09/17/24    Expected End:  10/01/24                 Vicki Jacome, PT

## 2024-09-20 LAB
ALBUMIN SERPL BCP-MCNC: 1.6 G/DL (ref 3.4–5)
ALP SERPL-CCNC: 481 U/L (ref 33–136)
ALT SERPL W P-5'-P-CCNC: 56 U/L (ref 10–52)
ANION GAP SERPL CALC-SCNC: 14 MMOL/L (ref 10–20)
APTT PPP: 30 SECONDS (ref 27–38)
AST SERPL W P-5'-P-CCNC: 62 U/L (ref 9–39)
BASOPHILS # BLD AUTO: 0.01 X10*3/UL (ref 0–0.1)
BASOPHILS NFR BLD AUTO: 0.2 %
BILIRUB DIRECT SERPL-MCNC: 1.7 MG/DL (ref 0–0.3)
BILIRUB SERPL-MCNC: 3.3 MG/DL (ref 0–1.2)
BUN SERPL-MCNC: 5 MG/DL (ref 6–23)
CALCIUM SERPL-MCNC: 7.3 MG/DL (ref 8.6–10.6)
CHLORIDE SERPL-SCNC: 106 MMOL/L (ref 98–107)
CO2 SERPL-SCNC: 22 MMOL/L (ref 21–32)
CREAT SERPL-MCNC: 0.8 MG/DL (ref 0.5–1.3)
EGFRCR SERPLBLD CKD-EPI 2021: >90 ML/MIN/1.73M*2
EOSINOPHIL # BLD AUTO: 0 X10*3/UL (ref 0–0.7)
EOSINOPHIL NFR BLD AUTO: 0 %
ERYTHROCYTE [DISTWIDTH] IN BLOOD BY AUTOMATED COUNT: 17.5 % (ref 11.5–14.5)
GLUCOSE SERPL-MCNC: 177 MG/DL (ref 74–99)
HCT VFR BLD AUTO: 29.4 % (ref 41–52)
HGB BLD-MCNC: 8.9 G/DL (ref 13.5–17.5)
IMM GRANULOCYTES # BLD AUTO: 0.07 X10*3/UL (ref 0–0.7)
IMM GRANULOCYTES NFR BLD AUTO: 1.2 % (ref 0–0.9)
INR PPP: 1.5 (ref 0.9–1.1)
LYMPHOCYTES # BLD AUTO: 0.9 X10*3/UL (ref 1.2–4.8)
LYMPHOCYTES NFR BLD AUTO: 14.9 %
MAGNESIUM SERPL-MCNC: 1.6 MG/DL (ref 1.6–2.4)
MCH RBC QN AUTO: 28.6 PG (ref 26–34)
MCHC RBC AUTO-ENTMCNC: 30.3 G/DL (ref 32–36)
MCV RBC AUTO: 95 FL (ref 80–100)
MONOCYTES # BLD AUTO: 0.71 X10*3/UL (ref 0.1–1)
MONOCYTES NFR BLD AUTO: 11.8 %
NEUTROPHILS # BLD AUTO: 4.35 X10*3/UL (ref 1.2–7.7)
NEUTROPHILS NFR BLD AUTO: 71.9 %
NRBC BLD-RTO: 5.6 /100 WBCS (ref 0–0)
PHOSPHATE SERPL-MCNC: 1.9 MG/DL (ref 2.5–4.9)
PLATELET # BLD AUTO: 70 X10*3/UL (ref 150–450)
POTASSIUM SERPL-SCNC: 4.1 MMOL/L (ref 3.5–5.3)
PROT SERPL-MCNC: 4.9 G/DL (ref 6.4–8.2)
PROTHROMBIN TIME: 17.4 SECONDS (ref 9.8–12.8)
RBC # BLD AUTO: 3.11 X10*6/UL (ref 4.5–5.9)
SODIUM SERPL-SCNC: 138 MMOL/L (ref 136–145)
WBC # BLD AUTO: 6 X10*3/UL (ref 4.4–11.3)

## 2024-09-20 PROCEDURE — 85610 PROTHROMBIN TIME: CPT

## 2024-09-20 PROCEDURE — 2500000004 HC RX 250 GENERAL PHARMACY W/ HCPCS (ALT 636 FOR OP/ED)

## 2024-09-20 PROCEDURE — 85025 COMPLETE CBC W/AUTO DIFF WBC: CPT

## 2024-09-20 PROCEDURE — 99231 SBSQ HOSP IP/OBS SF/LOW 25: CPT

## 2024-09-20 PROCEDURE — 2500000001 HC RX 250 WO HCPCS SELF ADMINISTERED DRUGS (ALT 637 FOR MEDICARE OP)

## 2024-09-20 PROCEDURE — 84100 ASSAY OF PHOSPHORUS: CPT

## 2024-09-20 PROCEDURE — 36415 COLL VENOUS BLD VENIPUNCTURE: CPT

## 2024-09-20 PROCEDURE — 1100000001 HC PRIVATE ROOM DAILY

## 2024-09-20 PROCEDURE — 83735 ASSAY OF MAGNESIUM: CPT

## 2024-09-20 PROCEDURE — 80076 HEPATIC FUNCTION PANEL: CPT

## 2024-09-20 PROCEDURE — 2500000005 HC RX 250 GENERAL PHARMACY W/O HCPCS

## 2024-09-20 RX ORDER — LACTULOSE 10 G/15ML
20 SOLUTION ORAL 3 TIMES DAILY
Qty: 2700 ML | Refills: 0 | Status: CANCELLED | OUTPATIENT
Start: 2024-09-20 | End: 2024-10-20

## 2024-09-20 RX ORDER — METOPROLOL TARTRATE 25 MG/1
25 TABLET, FILM COATED ORAL 2 TIMES DAILY
Qty: 60 TABLET | Refills: 0 | Status: CANCELLED | OUTPATIENT
Start: 2024-09-20

## 2024-09-20 ASSESSMENT — COGNITIVE AND FUNCTIONAL STATUS - GENERAL
DAILY ACTIVITIY SCORE: 19
MOBILITY SCORE: 15
PERSONAL GROOMING: A LITTLE
MOVING TO AND FROM BED TO CHAIR: A LITTLE
HELP NEEDED FOR BATHING: A LITTLE
MOVING FROM LYING ON BACK TO SITTING ON SIDE OF FLAT BED WITH BEDRAILS: A LITTLE
TURNING FROM BACK TO SIDE WHILE IN FLAT BAD: A LOT
DRESSING REGULAR LOWER BODY CLOTHING: A LITTLE
STANDING UP FROM CHAIR USING ARMS: A LITTLE
WALKING IN HOSPITAL ROOM: A LOT
TOILETING: A LITTLE
DRESSING REGULAR UPPER BODY CLOTHING: A LITTLE
CLIMB 3 TO 5 STEPS WITH RAILING: A LOT

## 2024-09-20 ASSESSMENT — PAIN SCALES - PAIN ASSESSMENT IN ADVANCED DEMENTIA (PAINAD): TOTALSCORE: MEDICATION (SEE MAR)

## 2024-09-20 ASSESSMENT — PAIN SCALES - GENERAL
PAINLEVEL_OUTOF10: 7
PAINLEVEL_OUTOF10: 5 - MODERATE PAIN
PAINLEVEL_OUTOF10: 7
PAINLEVEL_OUTOF10: 3

## 2024-09-20 ASSESSMENT — PAIN - FUNCTIONAL ASSESSMENT
PAIN_FUNCTIONAL_ASSESSMENT: 0-10

## 2024-09-20 ASSESSMENT — PAIN DESCRIPTION - ORIENTATION: ORIENTATION: MID

## 2024-09-20 ASSESSMENT — PAIN DESCRIPTION - LOCATION: LOCATION: ABDOMEN

## 2024-09-20 NOTE — CONSULTS
"Nutrition Follow Up Assessment:   Nutrition Assessment         Patient is a 63 y.o. male presenting with PMHx of HFpEF, DM, chronic pancreatitis, HTN, DLD, and alcohol use disorder presenting as a transfer from [facility] ICU for workup of acute liver injury vs failure and potential transplant evaluation. Patient states that he initially presented to [facility]  for shortness of breath, chest pain, and abdominal pain. There, he was diagnosed with pneumonia and was told he has some issues with his liver function and will be transferred to [facility] for further testing.        Nutrition History:  Energy Intake: Poor < 50 %  Food and Nutrient History: Pt cotninues to have poor PO intak, consuming mostly 0% of meals per documented PO. Pt recieving 2-3 meals a day, recievign carnation breakfast as well with meals. Pt medically ready for discharge pendign SNF placement  Food Allergies/Intolerances:  None  GI Symptoms: None  Oral Problems: None       Anthropometrics:  Height: 175.3 cm (5' 9\")   Weight: 75.8 kg (167 lb 1.6 oz)   BMI (Calculated): 24.67  IBW/kg (Dietitian Calculated): 72.7 kg  Percent of IBW: 93 %       Weight History:   Wt Readings from Last 20 Encounters:   09/20/24 75.8 kg (167 lb 1.6 oz)   09/11/24 69.9 kg (154 lb)      Weight Change %:  Weight History / % Weight Change: 13# weight gain x 9 days likely scale variances and or fluid shifts  Significant Weight Loss: No    Nutrition Focused Physical Exam Findings:    Subcutaneous Fat Loss:   Orbital Fat Pads: Mild-Moderate (slight dark circles and slight hollowing)  Buccal Fat Pads: Well nourished (full, rounded cheeks)  Muscle Wasting:  Temporalis: Well nourished (well-defined muscle)  Pectoralis (Clavicular Region): Mild-Moderate (some protrusion of clavicle)  Trapezius/Infraspinatus/Supraspinatus (Scapular Region): Mild-Moderate (slight protrusion of scapula)  Edema:  Edema: none  Physical Findings:  Skin: Positive    Nutrition Significant Labs:  BMP Trend:   Results " from last 7 days   Lab Units 09/20/24  0904 09/19/24  0904 09/18/24  0651 09/17/24  1001   GLUCOSE mg/dL 177* 144* 132* 200*   CALCIUM mg/dL 7.3* 7.6* 7.4* 7.6*   SODIUM mmol/L 138 137 137 137   POTASSIUM mmol/L 4.1 4.0 4.3 4.3   CO2 mmol/L 22 25 21 23   CHLORIDE mmol/L 106 104 105 104   BUN mg/dL 5* 5* 5* 6   CREATININE mg/dL 0.80 0.90 0.83 1.10    , BG POCT trend:   Results from last 7 days   Lab Units 09/13/24  1557   POCT GLUCOSE mg/dL 111*    , Renal Lab Trend:   Results from last 7 days   Lab Units 09/20/24  0904 09/19/24  0904 09/18/24  0651 09/17/24  1001   POTASSIUM mmol/L 4.1 4.0 4.3 4.3   PHOSPHORUS mg/dL 1.9* 2.4* 2.3* 2.4*   SODIUM mmol/L 138 137 137 137   MAGNESIUM mg/dL 1.60 1.67 1.78 1.75   EGFR mL/min/1.73m*2 >90 >90 >90 75   BUN mg/dL 5* 5* 5* 6   CREATININE mg/dL 0.80 0.90 0.83 1.10        Nutrition Specific Medications:  All med's reviewed     I/O:   Last BM Date: 09/18/24; Stool Appearance: Loose (09/17/24 2230)    Dietary Orders (From admission, onward)       Start     Ordered    09/16/24 1735  Adult diet Regular  Diet effective now        Question:  Diet type  Answer:  Regular    09/16/24 1735    09/13/24 1304  Oral nutritional supplements  Until discontinued        Comments: With milk of pt's preference   Question Answer Comment   Deliver with Breakfast    Deliver with Lunch    Select supplement: [employer] Breakfast Essentials        09/13/24 1303    09/13/24 1304  Oral nutritional supplements  Until discontinued        Question Answer Comment   Deliver with Lunch    Deliver with Dinner    Select supplement: Gelatein Plus        09/13/24 1304                     Estimated Needs:   Total Energy Estimated Needs (kCal): 2000 kCal  Method for Estimating Needs: 30kcal/kg per act wt  Total Protein Estimated Needs (g): 90 g  Method for Estimating Needs: ~1.3g/kg per act wt  Total Fluid Estimated Needs (mL):  (per team)           Nutrition Diagnosis   Malnutrition Diagnosis  Patient has Malnutrition  Diagnosis: Yes  Diagnosis Status: Ongoing  Malnutrition Diagnosis: Severe malnutrition related to acute disease or injury (could be multifactorial (social/environmental and chronic as well))  As Evidenced by: severe muscle wasting and fat loss (visual), reported not eating anything for weeks/ETOH use (predicted intake <50% of needs for >/=5 days)    Nutrition Diagnosis  Patient has Nutrition Diagnosis: Yes  Diagnosis Status (1): Ongoing  Nutrition Diagnosis 1: Increased nutrient needs  Related to (1): increased metabolic demand  As Evidenced by (1): acute liver failure       Nutrition Interventions/Recommendations         Nutrition Prescription:  Individualized Nutrition Prescription Provided for : 2000 kcal, 90gm PRO PO + ONS        Nutrition Interventions:   Interventions: Meals and snacks  Goal: Continue adult diet regular as tolerated and per team  Medical Food Supplement: Commercial food  Goal: Continue carnation instant breakfast      Nutrition Education: n/a         Nutrition Monitoring and Evaluation   Food/Nutrient Related History Monitoring  Monitoring and Evaluation Plan: Energy intake  Criteria: Meeting >75% energy needs via PO    Body Composition/Growth/Weight History  Monitoring and Evaluation Plan: Weight  Criteria: No significant wt changes durign admission                   Time Spent (min): 30 minutes

## 2024-09-20 NOTE — PROGRESS NOTES
Manuel Bowser is a 63 y.o. male on day 7 of admission presenting with Alcoholic liver failure (Multi).      Subjective   No acute events overnight.    Patient was seen and examined at bedside this AM. On evaluation, he denies any new concerns or symptoms.     He denies current symptoms including fever, chills, dizziness, lightheadedness, SOB, chest pain, chest pressure, palpitations, abdominal pain, n/v, constipation, diarrhea.     Objective     Last Recorded Vitals  /65   Pulse 85   Temp 36.2 °C (97.2 °F)   Resp 17   Wt 75.8 kg (167 lb 1.6 oz)   SpO2 96%   Intake/Output last 3 Shifts:    Intake/Output Summary (Last 24 hours) at 9/20/2024 1032  Last data filed at 9/19/2024 1300  Gross per 24 hour   Intake 120 ml   Output --   Net 120 ml         Admission Weight  Weight: 67.5 kg (148 lb 13 oz) (09/13/24 0100)    Daily Weight  09/20/24 : 75.8 kg (167 lb 1.6 oz)    Image Results      Physical Exam  Constitutional:       General: He is not in acute distress. A&O x4. Has somewhat flat affect, makes seldom eye contact     Appearance: He is not diaphoretic.   HENT:      Head: Normocephalic.      Eyes: Scleral icterus present.      Mouth/Throat:      Mouth: Mucous membranes are moist.   Cardiovascular:      Rate and Rhythm: Normal rate and regular rhythm.      Heart sounds: No murmur heard.  Pulmonary:      Effort: No respiratory distress.      Breath sounds: No rhonchi or rales.   Abdominal:      General: slightly distended abdomen       Palpations: There is no mass.      Tenderness: There is no abdominal tenderness. There is no guarding or rebound.   Musculoskeletal:         General: No swelling. No asterixis.      Right lower leg: No edema.      Left lower leg: No edema.   Skin:     Capillary Refill: Capillary refill takes less than 2 seconds.      Coloration: Skin is pale. Skin is not jaundiced.      Findings: No bruising.      Right jugular   Neurological:      General: No focal deficit present.       Mental Status: He is alert and oriented to person, place, and time.        Relevant Results  Results for orders placed or performed during the hospital encounter of 09/13/24 (from the past 24 hour(s))   CBC and Auto Differential   Result Value Ref Range    WBC 6.0 4.4 - 11.3 x10*3/uL    nRBC 5.6 (H) 0.0 - 0.0 /100 WBCs    RBC 3.11 (L) 4.50 - 5.90 x10*6/uL    Hemoglobin 8.9 (L) 13.5 - 17.5 g/dL    Hematocrit 29.4 (L) 41.0 - 52.0 %    MCV 95 80 - 100 fL    MCH 28.6 26.0 - 34.0 pg    MCHC 30.3 (L) 32.0 - 36.0 g/dL    RDW 17.5 (H) 11.5 - 14.5 %    Platelets 70 (L) 150 - 450 x10*3/uL    Neutrophils % 71.9 40.0 - 80.0 %    Immature Granulocytes %, Automated 1.2 (H) 0.0 - 0.9 %    Lymphocytes % 14.9 13.0 - 44.0 %    Monocytes % 11.8 2.0 - 10.0 %    Eosinophils % 0.0 0.0 - 6.0 %    Basophils % 0.2 0.0 - 2.0 %    Neutrophils Absolute 4.35 1.20 - 7.70 x10*3/uL    Immature Granulocytes Absolute, Automated 0.07 0.00 - 0.70 x10*3/uL    Lymphocytes Absolute 0.90 (L) 1.20 - 4.80 x10*3/uL    Monocytes Absolute 0.71 0.10 - 1.00 x10*3/uL    Eosinophils Absolute 0.00 0.00 - 0.70 x10*3/uL    Basophils Absolute 0.01 0.00 - 0.10 x10*3/uL   Coagulation Screen   Result Value Ref Range    Protime 17.4 (H) 9.8 - 12.8 seconds    INR 1.5 (H) 0.9 - 1.1    aPTT 30 27 - 38 seconds          Assessment/Plan      Assessment & Plan  Alcoholic liver failure (Multi)    Acute on chronic alcoholic liver disease (Multi)    Hepatic encephalopathy (Multi)    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 He was continued on lactulose and acetylcysteine on transfer to  MICU. Admission was c/b supratherapeutic INR, hepatic  encephalopathy and c/f hemolytic anemia, which have resolved. Pt underwent transjugular liver bx, paracentesis, and hepatic pressure cath on 9/16, c/w portal hypertension. Awaiting liver bx results and pt to follow up with hepatology outpatient.     Updates 9/20:  -Hepatology signed off: outpatient hepatology appt w/ Dr. Ansari scheduled for 10/9/2024  -Antimitochondrial Ab negative, anti smooth muscle Ab positive. Peth negative  -Awaiting liver biopsy results  -Awaiting SNF placement; will not resume ASA or statin on discharge given thrombocytopenia, recent coagulopathy with INR >9, and acute liver injury.     #Acute liver injury/failure, possible acute on chronic liver disease  #HE, resolved   #Coagulopathy, resolved   -Hx of chronic alcohol use disorder  -Patient presents with acute liver injury with INR >9.5 +/- failure given reported encephalopathy with A&Ox2 at OSH. Currently A&Ox4, no asterixis or c/f current encephalopathy though has been treated with lactulose  -Hepatic function panel severely deranged-Alb-1.7,ALP-804,ALT-278,AST-945  -Hepatitis panel, A1AT, Ceruloplasmin,HSV1&2, Acetaminophen level,AFP,Fibrinogen,MICHAEL are all within normal limits  -Ferritin>9000,serum iron-85 WNL  -EBV DNA<35 detected  -IgG-1600  -AMA-29.3 (uln 24.9, >25 is positive), repeat AMA-9/14 is negative  -liver US shows echogenic appearance of the liver compatible with steatosis and normal Doppler interrogation of the hepatic vasculature. There is mild abdominal ascites and a right pleural effusion   -INR initially as high as 9 on admission, now stable at 1.3  Plan-  -Hepatology signing off, pt to follow up outpatient on 10/9/24  -will follow up on pending hepatic work up  -continue with lactulose 20g TID, titrate prn to 3-4 BM/day   -Monitor Hgb, Coags, signs of bleed  -Daily MELD labs    #Acute anaemia/concern for hemolysis  -Hgb 12.0 to 9.5   - pale but not jaundiced  -Tbili-2.7, indirect-1.4, LDH-846,  Haptoglobin<30  -peripheral blood smear-Mild microcytosis with many target cells and many Pappenheimer bodies. Findings consistent with hemoglobinopathy.   -Hb remains stable   Plan-  -monitor Hb     # Traumatic catheterization  -Difficult catheterization at MICU with bleeding.  -patient accidentally urinated without difficulty  Plan-  -Urology consulted; hematuria felt to be due more to supratherapeutic INR than urethral trauma  -Pt voiding on his own; will defer butts or bladder scans unless develops retention     #HFpEF  #Tachycardia  -limited ECHO -9/13 shows EF of 78%  -CXR without signs of pulmonary edema  -Tachycardia resolved  -on home metop 25mg bid     Disposition: pending SNF placement w/ outpatient hepatology follow up       FEN  Fluids: PRN  Electrolytes: K>4, Mg>2  Nutrition: Adult regular diet  Prophylaxis:  DVT ppx: chem ppx held iso coagulopathy, SCDs  GI ppx: PPI  Bowel care: lactulose  Hardware:                        Social:  Code: Full Code    HPOA: Mayela (wife) [phone]  Disposition: Floor    Patient seen and discussed with attending physician, Dr. Wise.     Adia Nguyen MD  Internal Medicine PGY-1

## 2024-09-20 NOTE — DISCHARGE INSTRUCTIONS
Dear Manuel Bowser,     It was a pleasure caring for you! You initially were seen at another hospital for abdominal pain. When you were there you had blood work that showed liver injury, so you were transferred to [facility] on 9/13/2024 for evaluation with liver specialists. While you were here, we did a number of tests to try and identify the cause of your liver injury. We also started you on a liquid medication called lactulose. This medication helps with your confusion by clearing the toxins from your body. It may cause you to have increased bowel movements but it's important that you continue to take it. Please come to the emergency department if you develop any worsening symptoms.     Please see your After Visit Summary for detailed instructions on how you should take your medications as well as a list of your future appointment(s).    Thank you for allowing us to take part in your care and we wish you the best in your recovery.    - Your  Care Team

## 2024-09-20 NOTE — PROGRESS NOTES
Pt medically ready. 7000 completed by DSC. [facility] confirmed they started auth today. Medical team aware. Will continue to follow.

## 2024-09-20 NOTE — CARE PLAN
The patient's goals for the shift include Pain control to 4-6 by the end of the shift    The clinical goals for the shift include Plan for discharge to SNF once choice is given to CM    Over the shift, the patient did not make progress toward the following goals. Barriers to progression include administer pain medications as ordered as needed. Recommendations to address these barriers include Q2H pruposeful rounding  .

## 2024-09-21 LAB
ALBUMIN SERPL BCP-MCNC: 1.8 G/DL (ref 3.4–5)
ALP SERPL-CCNC: 487 U/L (ref 33–136)
ALT SERPL W P-5'-P-CCNC: 54 U/L (ref 10–52)
ANION GAP SERPL CALC-SCNC: 12 MMOL/L (ref 10–20)
APTT PPP: 31 SECONDS (ref 27–38)
AST SERPL W P-5'-P-CCNC: 59 U/L (ref 9–39)
BASOPHILS # BLD AUTO: 0 X10*3/UL (ref 0–0.1)
BASOPHILS NFR BLD AUTO: 0 %
BILIRUB DIRECT SERPL-MCNC: 2 MG/DL (ref 0–0.3)
BILIRUB SERPL-MCNC: 3.9 MG/DL (ref 0–1.2)
BUN SERPL-MCNC: 6 MG/DL (ref 6–23)
CALCIUM SERPL-MCNC: 7.6 MG/DL (ref 8.6–10.6)
CHLORIDE SERPL-SCNC: 103 MMOL/L (ref 98–107)
CO2 SERPL-SCNC: 24 MMOL/L (ref 21–32)
CREAT SERPL-MCNC: 0.87 MG/DL (ref 0.5–1.3)
EGFRCR SERPLBLD CKD-EPI 2021: >90 ML/MIN/1.73M*2
EOSINOPHIL # BLD AUTO: 0 X10*3/UL (ref 0–0.7)
EOSINOPHIL NFR BLD AUTO: 0 %
ERYTHROCYTE [DISTWIDTH] IN BLOOD BY AUTOMATED COUNT: 17.3 % (ref 11.5–14.5)
GLUCOSE BLD MANUAL STRIP-MCNC: 164 MG/DL (ref 74–99)
GLUCOSE SERPL-MCNC: 196 MG/DL (ref 74–99)
HCT VFR BLD AUTO: 29.7 % (ref 41–52)
HGB BLD-MCNC: 9.4 G/DL (ref 13.5–17.5)
IMM GRANULOCYTES # BLD AUTO: 0.02 X10*3/UL (ref 0–0.7)
IMM GRANULOCYTES NFR BLD AUTO: 0.2 % (ref 0–0.9)
INR PPP: 1.5 (ref 0.9–1.1)
LYMPHOCYTES # BLD AUTO: 1.28 X10*3/UL (ref 1.2–4.8)
LYMPHOCYTES NFR BLD AUTO: 15.9 %
MAGNESIUM SERPL-MCNC: 1.61 MG/DL (ref 1.6–2.4)
MCH RBC QN AUTO: 28.7 PG (ref 26–34)
MCHC RBC AUTO-ENTMCNC: 31.6 G/DL (ref 32–36)
MCV RBC AUTO: 91 FL (ref 80–100)
MONOCYTES # BLD AUTO: 0.7 X10*3/UL (ref 0.1–1)
MONOCYTES NFR BLD AUTO: 8.7 %
NEUTROPHILS # BLD AUTO: 6.04 X10*3/UL (ref 1.2–7.7)
NEUTROPHILS NFR BLD AUTO: 75.2 %
NRBC BLD-RTO: 3.6 /100 WBCS (ref 0–0)
PHOSPHATE SERPL-MCNC: 2.2 MG/DL (ref 2.5–4.9)
PLATELET # BLD AUTO: 66 X10*3/UL (ref 150–450)
POTASSIUM SERPL-SCNC: 4.2 MMOL/L (ref 3.5–5.3)
PROT SERPL-MCNC: 5.5 G/DL (ref 6.4–8.2)
PROTHROMBIN TIME: 17.2 SECONDS (ref 9.8–12.8)
RBC # BLD AUTO: 3.28 X10*6/UL (ref 4.5–5.9)
SODIUM SERPL-SCNC: 135 MMOL/L (ref 136–145)
WBC # BLD AUTO: 8 X10*3/UL (ref 4.4–11.3)

## 2024-09-21 PROCEDURE — 85610 PROTHROMBIN TIME: CPT

## 2024-09-21 PROCEDURE — 99231 SBSQ HOSP IP/OBS SF/LOW 25: CPT

## 2024-09-21 PROCEDURE — 2500000001 HC RX 250 WO HCPCS SELF ADMINISTERED DRUGS (ALT 637 FOR MEDICARE OP)

## 2024-09-21 PROCEDURE — 2500000005 HC RX 250 GENERAL PHARMACY W/O HCPCS

## 2024-09-21 PROCEDURE — 83735 ASSAY OF MAGNESIUM: CPT

## 2024-09-21 PROCEDURE — 80053 COMPREHEN METABOLIC PANEL: CPT

## 2024-09-21 PROCEDURE — 84100 ASSAY OF PHOSPHORUS: CPT

## 2024-09-21 PROCEDURE — 2500000004 HC RX 250 GENERAL PHARMACY W/ HCPCS (ALT 636 FOR OP/ED)

## 2024-09-21 PROCEDURE — 82248 BILIRUBIN DIRECT: CPT

## 2024-09-21 PROCEDURE — 1100000001 HC PRIVATE ROOM DAILY

## 2024-09-21 PROCEDURE — 36415 COLL VENOUS BLD VENIPUNCTURE: CPT

## 2024-09-21 PROCEDURE — 85025 COMPLETE CBC W/AUTO DIFF WBC: CPT

## 2024-09-21 PROCEDURE — 82947 ASSAY GLUCOSE BLOOD QUANT: CPT

## 2024-09-21 ASSESSMENT — PAIN SCALES - GENERAL
PAINLEVEL_OUTOF10: 8
PAINLEVEL_OUTOF10: 8
PAINLEVEL_OUTOF10: 9
PAINLEVEL_OUTOF10: 7
PAINLEVEL_OUTOF10: 8
PAINLEVEL_OUTOF10: 9
PAINLEVEL_OUTOF10: 0 - NO PAIN

## 2024-09-21 ASSESSMENT — PAIN DESCRIPTION - ORIENTATION
ORIENTATION: MID
ORIENTATION: MID

## 2024-09-21 ASSESSMENT — PAIN - FUNCTIONAL ASSESSMENT
PAIN_FUNCTIONAL_ASSESSMENT: 0-10

## 2024-09-21 ASSESSMENT — PAIN DESCRIPTION - LOCATION: LOCATION: ABDOMEN

## 2024-09-21 NOTE — PROGRESS NOTES
Manuel Bowser is a 63 y.o. male on day 8 of admission presenting with Alcoholic liver failure (Multi).      Subjective   No acute events overnight. Patient does not complain of any new symptoms.          Objective     Last Recorded Vitals  BP 95/65 (Patient Position: Lying)   Pulse 72   Temp 36.3 °C (97.3 °F)   Resp 18   Wt 75.4 kg (166 lb 3.6 oz)   SpO2 96%   Intake/Output last 3 Shifts:    Intake/Output Summary (Last 24 hours) at 9/21/2024 1447  Last data filed at 9/21/2024 1216  Gross per 24 hour   Intake --   Output 200 ml   Net -200 ml         Admission Weight  Weight: 67.5 kg (148 lb 13 oz) (09/13/24 0100)    Daily Weight  09/21/24 : 75.4 kg (166 lb 3.6 oz)    Image Results      Physical Exam  Constitutional:       General: He is not in acute distress. A&O x4. Has somewhat flat affect, makes seldom eye contact     Appearance: He is not diaphoretic.   HENT:      Head: Normocephalic.      Eyes: Scleral icterus present.      Mouth/Throat:      Mouth: Mucous membranes are moist.   Cardiovascular:      Rate and Rhythm: Normal rate and regular rhythm.      Heart sounds: No murmur heard.  Pulmonary:      Effort: No respiratory distress.      Breath sounds: No rhonchi or rales.   Abdominal:      General: slightly distended abdomen       Palpations: There is no mass.      Tenderness: There is no abdominal tenderness. There is no guarding or rebound.   Musculoskeletal:         General: No swelling. No asterixis.      Right lower leg: No edema.      Left lower leg: No edema.   Skin:     Capillary Refill: Capillary refill takes less than 2 seconds.      Coloration: Skin is pale. Skin is not jaundiced.      Findings: No bruising.      Right jugular   Neurological:      General: No focal deficit present.      Mental Status: He is alert and oriented to person, place, and time.        Relevant Results  Results for orders placed or performed during the hospital encounter of 09/13/24 (from the past 24 hour(s))   CBC and  Auto Differential   Result Value Ref Range    WBC 8.0 4.4 - 11.3 x10*3/uL    nRBC 3.6 (H) 0.0 - 0.0 /100 WBCs    RBC 3.28 (L) 4.50 - 5.90 x10*6/uL    Hemoglobin 9.4 (L) 13.5 - 17.5 g/dL    Hematocrit 29.7 (L) 41.0 - 52.0 %    MCV 91 80 - 100 fL    MCH 28.7 26.0 - 34.0 pg    MCHC 31.6 (L) 32.0 - 36.0 g/dL    RDW 17.3 (H) 11.5 - 14.5 %    Platelets 66 (L) 150 - 450 x10*3/uL    Neutrophils % 75.2 40.0 - 80.0 %    Immature Granulocytes %, Automated 0.2 0.0 - 0.9 %    Lymphocytes % 15.9 13.0 - 44.0 %    Monocytes % 8.7 2.0 - 10.0 %    Eosinophils % 0.0 0.0 - 6.0 %    Basophils % 0.0 0.0 - 2.0 %    Neutrophils Absolute 6.04 1.20 - 7.70 x10*3/uL    Immature Granulocytes Absolute, Automated 0.02 0.00 - 0.70 x10*3/uL    Lymphocytes Absolute 1.28 1.20 - 4.80 x10*3/uL    Monocytes Absolute 0.70 0.10 - 1.00 x10*3/uL    Eosinophils Absolute 0.00 0.00 - 0.70 x10*3/uL    Basophils Absolute 0.00 0.00 - 0.10 x10*3/uL   Coagulation Screen   Result Value Ref Range    Protime 17.2 (H) 9.8 - 12.8 seconds    INR 1.5 (H) 0.9 - 1.1    aPTT 31 27 - 38 seconds   Magnesium   Result Value Ref Range    Magnesium 1.61 1.60 - 2.40 mg/dL   Hepatic function panel   Result Value Ref Range    Albumin 1.8 (L) 3.4 - 5.0 g/dL    Bilirubin, Total 3.9 (H) 0.0 - 1.2 mg/dL    Bilirubin, Direct 2.0 (H) 0.0 - 0.3 mg/dL    Alkaline Phosphatase 487 (H) 33 - 136 U/L    ALT 54 (H) 10 - 52 U/L    AST 59 (H) 9 - 39 U/L    Total Protein 5.5 (L) 6.4 - 8.2 g/dL   Phosphorus   Result Value Ref Range    Phosphorus 2.2 (L) 2.5 - 4.9 mg/dL   Basic Metabolic Panel   Result Value Ref Range    Glucose 196 (H) 74 - 99 mg/dL    Sodium 135 (L) 136 - 145 mmol/L    Potassium 4.2 3.5 - 5.3 mmol/L    Chloride 103 98 - 107 mmol/L    Bicarbonate 24 21 - 32 mmol/L    Anion Gap 12 10 - 20 mmol/L    Urea Nitrogen 6 6 - 23 mg/dL    Creatinine 0.87 0.50 - 1.30 mg/dL    eGFR >90 >60 mL/min/1.73m*2    Calcium 7.6 (L) 8.6 - 10.6 mg/dL          Assessment/Plan      Assessment &  Plan  Alcoholic liver failure (Multi)    Acute on chronic alcoholic liver disease (Multi)    Hepatic encephalopathy (Multi)    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 He was continued on lactulose and acetylcysteine on transfer to  MICU. Admission was c/b supratherapeutic INR, hepatic encephalopathy and c/f hemolytic anemia, which have resolved. Pt underwent transjugular liver bx, paracentesis, and hepatic pressure cath on 9/16, c/w portal hypertension. Awaiting liver bx results and pt to follow up with hepatology outpatient.     Updates 9/20:  -Hepatology signed off: outpatient hepatology appt w/ Dr. Ansari scheduled for 10/9/2024  -Antimitochondrial Ab negative, anti smooth muscle Ab positive. Peth negative  -Awaiting liver biopsy results  -Awaiting SNF placement; will not resume ASA or statin on discharge given thrombocytopenia, recent coagulopathy with INR >9, and acute liver injury.     #Acute liver injury/failure, possible acute on chronic liver disease  #HE, resolved   #Coagulopathy, resolved   -Hx of chronic alcohol use disorder  -Patient presents with acute liver injury with INR >9.5 +/- failure given reported encephalopathy with A&Ox2 at OSH. Currently A&Ox4, no asterixis or c/f current encephalopathy though has been treated with lactulose  -Hepatic function panel severely deranged-Alb-1.7,ALP-804,ALT-278,AST-945  -Hepatitis panel, A1AT, Ceruloplasmin,HSV1&2, Acetaminophen level,AFP,Fibrinogen,MICHAEL are all within normal limits  -Ferritin>9000,serum iron-85 WNL  -EBV DNA<35 detected  -IgG-1600  -AMA-29.3 (uln 24.9, >25 is positive), repeat AMA-9/14 is  negative  -liver US shows echogenic appearance of the liver compatible with steatosis and normal Doppler interrogation of the hepatic vasculature. There is mild abdominal ascites and a right pleural effusion   -INR initially as high as 9 on admission, now stable at 1.3  Plan-  -Hepatology signing off, pt to follow up outpatient on 10/9/24  -will follow up on pending hepatic work up  -continue with lactulose 20g TID, titrate prn to 3-4 BM/day   -Monitor Hgb, Coags, signs of bleed  -Daily MELD labs    #Acute anaemia/concern for hemolysis  -Hgb 12.0 to 9.5   - pale but not jaundiced  -Tbili-2.7, indirect-1.4, LDH-846, Haptoglobin<30  -peripheral blood smear-Mild microcytosis with many target cells and many Pappenheimer bodies. Findings consistent with hemoglobinopathy.   -Hb remains stable   Plan-  -monitor Hb     # Traumatic catheterization  -Difficult catheterization at MICU with bleeding.  -patient accidentally urinated without difficulty  Plan-  -Urology consulted; hematuria felt to be due more to supratherapeutic INR than urethral trauma  -Pt voiding on his own; will defer butts or bladder scans unless develops retention     #HFpEF  #Tachycardia  -limited ECHO -9/13 shows EF of 78%  -CXR without signs of pulmonary edema  -Tachycardia resolved  -on home metop 25mg bid     Disposition: pending SNF placement w/ outpatient hepatology follow up       FEN  Fluids: PRN  Electrolytes: K>4, Mg>2  Nutrition: Adult regular diet  Prophylaxis:  DVT ppx: chem ppx held iso coagulopathy, SCDs  GI ppx: PPI  Bowel care: lactulose  Hardware:                        Social:  Code: Full Code    HPOA: Mayela (wife) [phone]  Disposition: Floor    Patient seen and discussed with attending physician, Dr. Wise.     Jaxon Wayne, DO  Internal Medicine PGY-1

## 2024-09-21 NOTE — CARE PLAN
The patient's goals for the shift include Pain control to 4-6 by the end of the shift    The clinical goals for the shift include Plan for discharge to SNF once choice is given to CM    Patient remained safe and stable this shift, no acute events this shift. Awaiting discharge to SNF

## 2024-09-21 NOTE — CARE PLAN
Problem: Fall/Injury  Goal: Not fall by end of shift  Outcome: Progressing  Goal: Be free from injury by end of the shift  Outcome: Progressing  Goal: Verbalize understanding of personal risk factors for fall in the hospital  Outcome: Progressing  Goal: Verbalize understanding of risk factor reduction measures to prevent injury from fall in the home  Outcome: Progressing  Goal: Use assistive devices by end of the shift  Outcome: Progressing  Goal: Pace activities to prevent fatigue by end of the shift  Outcome: Progressing     Problem: Skin  Goal: Decreased wound size/increased tissue granulation at next dressing change  Outcome: Progressing  Goal: Participates in plan/prevention/treatment measures  Outcome: Progressing  Goal: Prevent/manage excess moisture  Outcome: Progressing  Goal: Prevent/minimize sheer/friction injuries  Outcome: Progressing  Flowsheets (Taken 9/21/2024 6604)  Prevent/minimize sheer/friction injuries:   Turn/reposition every 2 hours/use positioning/transfer devices   HOB 30 degrees or less   Increase activity/out of bed for meals  Goal: Promote/optimize nutrition  Outcome: Progressing  Goal: Promote skin healing  Outcome: Progressing     Problem: Pain  Goal: Takes deep breaths with improved pain control throughout the shift  Outcome: Progressing  Goal: Turns in bed with improved pain control throughout the shift  Outcome: Progressing  Goal: Walks with improved pain control throughout the shift  Outcome: Progressing  Goal: Performs ADL's with improved pain control throughout shift  Outcome: Progressing  Goal: Participates in PT with improved pain control throughout the shift  Outcome: Progressing  Goal: Free from opioid side effects throughout the shift  Outcome: Progressing  Goal: Free from acute confusion related to pain meds throughout the shift  Outcome: Progressing   The patient's goals for the shift include Pain control to 4-6 by the end of the shift    The clinical goals for the shift  include Patient will rate pain <5 by the end of this shift

## 2024-09-21 NOTE — PROGRESS NOTES
"              After Visit Summary   6/16/2017    Kina Pond    MRN: [id number]           Patient Information     Date Of Birth          1984        Visit Information        Provider Department      6/16/2017 10:00 AM Shannan Hirsch LGSW [facility] [place]        Today's Diagnoses     Deferred diagnosis on axis I    -  1       Follow-ups after your visit        Your next 10 appointments already scheduled     Jul 24, 2017  1:45 PM CDT   (Arrive by 1:30 PM)   PROCEDURE with Katrin Cifuentes MD   [facility] [place] ([facility] [place] )    [address]  [place] [address]   [phone]              Who to contact     If you have questions or need follow up information about today's clinic visit or your schedule please contact [facility] directly at [phone].  Normal or non-critical lab and imaging results will be communicated to you by MyChart, letter or phone within 4 business days after the clinic has received the results. If you do not hear from us within 7 days, please contact the clinic through MyChart or phone. If you have a critical or abnormal lab result, we will notify you by phone as soon as possible.  Submit refill requests through [employer] or call your pharmacy and they will forward the refill request to us. Please allow 3 business days for your refill to be completed.          Additional Information About Your Visit        MyChart Information     [employer] lets you send messages to your doctor, view your test results, renew your prescriptions, schedule appointments and more. To sign up, go to [email or web address] . Click on \"Log in\" on the left side of the screen, which will take you to the Welcome page. Then click on \"Sign up Now\" on the right side of the page.     You will be asked to enter the access code listed below, as well as some personal information. Please follow the directions to create your username and password.     Your access " Per [facility], pt's insurance is closed this weekend so will follow up with auth on 9/23. Updated medical team.    code is: [email or web address]  Expires: 2017  9:24 AM     Your access code will  in 90 days. If you need help or a new code, please call your [place] clinic or [phone].        Care EveryWhere ID     This is your Care EveryWhere ID. This could be used by other organizations to access your [place] medical records  [id number]         Blood Pressure from Last 3 Encounters:   17 110/60   05/10/17 112/62   17 110/64    Weight from Last 3 Encounters:   17 120 lb (54.4 kg)   05/10/17 120 lb (54.4 kg)   17 120 lb (54.4 kg)              Today, you had the following     No orders found for display       Primary Care Provider Office Phone # Fax #    DAYANNA Goodman [phone]       [facility] [address]  [place] [address]        Thank you!     Thank you for choosing [facility] [place]  for your care. Our goal is always to provide you with excellent care. Hearing back from our patients is one way we can continue to improve our services. Please take a few minutes to complete the written survey that you may receive in the mail after your visit with us. Thank you!             Your Updated Medication List - Protect others around you: Learn how to safely use, store and throw away your medicines at www.disposemymeds.org.          This list is accurate as of: 17 11:12 AM.  Always use your most recent med list.                   Brand Name Dispense Instructions for use    ALPRAZolam 0.5 MG tablet    XANAX    15 tablet    Take 1 tablet (0.5 mg) by mouth 2 times daily as needed for anxiety       clindamycin 1 % topical gel    CLINDAMAX    60 g    Apply topically 2 times daily       cyclobenzaprine 10 MG tablet    FLEXERIL    30 tablet    Take 0.5-1 tablets (5-10 mg) by mouth 3 times daily as needed for muscle spasms       * escitalopram 5 MG tablet    LEXAPRO    30 tablet    TAKE 1 TABLET BY MOUTH DAILY       * escitalopram 5 MG tablet    LEXAPRO     30 tablet    TAKE 1 TABLET BY MOUTH DAILY       * escitalopram 10 MG tablet    LEXAPRO    30 tablet    Take 1 tablet (10 mg) by mouth daily       etonogestrel 68 MG Impl    IMPLANON/NEXPLANON     1 each (68 mg) by Subdermal route continuous       fluticasone 50 MCG/ACT spray    FLONASE    1 Bottle    Spray 1 spray into both nostrils daily       meclizine 25 MG tablet    ANTIVERT    30 tablet    Take 1 tablet (25 mg) by mouth every 6 hours as needed for dizziness       polyethylene glycol powder    MIRALAX    510 g    Take 17 g (1 capful) by mouth daily As needed for irritable bowel sx.  If no bm in 2 day take one scoop       traZODone 50 MG tablet    DESYREL    30 tablet    TAKE 1 TABLET(50 MG) BY MOUTH EVERY NIGHT AS NEEDED FOR SLEEP       * Notice:  This list has 3 medication(s) that are the same as other medications prescribed for you. Read the directions carefully, and ask your doctor or other care provider to review them with you.

## 2024-09-22 VITALS
SYSTOLIC BLOOD PRESSURE: 123 MMHG | DIASTOLIC BLOOD PRESSURE: 86 MMHG | RESPIRATION RATE: 17 BRPM | WEIGHT: 164.24 LBS | TEMPERATURE: 97.5 F | HEIGHT: 69 IN | HEART RATE: 80 BPM | OXYGEN SATURATION: 95 % | BODY MASS INDEX: 24.33 KG/M2

## 2024-09-22 LAB
ALBUMIN SERPL BCP-MCNC: 1.9 G/DL (ref 3.4–5)
ALP SERPL-CCNC: 505 U/L (ref 33–136)
ALT SERPL W P-5'-P-CCNC: 54 U/L (ref 10–52)
ANION GAP SERPL CALC-SCNC: 12 MMOL/L (ref 10–20)
APTT PPP: 30 SECONDS (ref 27–38)
AST SERPL W P-5'-P-CCNC: 63 U/L (ref 9–39)
BASOPHILS # BLD AUTO: 0 X10*3/UL (ref 0–0.1)
BASOPHILS NFR BLD AUTO: 0 %
BILIRUB DIRECT SERPL-MCNC: 2.5 MG/DL (ref 0–0.3)
BILIRUB SERPL-MCNC: 4.3 MG/DL (ref 0–1.2)
BUN SERPL-MCNC: 7 MG/DL (ref 6–23)
CALCIUM SERPL-MCNC: 7.8 MG/DL (ref 8.6–10.6)
CHLORIDE SERPL-SCNC: 102 MMOL/L (ref 98–107)
CO2 SERPL-SCNC: 25 MMOL/L (ref 21–32)
CREAT SERPL-MCNC: 0.84 MG/DL (ref 0.5–1.3)
EGFRCR SERPLBLD CKD-EPI 2021: >90 ML/MIN/1.73M*2
EOSINOPHIL # BLD AUTO: 0.01 X10*3/UL (ref 0–0.7)
EOSINOPHIL NFR BLD AUTO: 0.1 %
ERYTHROCYTE [DISTWIDTH] IN BLOOD BY AUTOMATED COUNT: 18.1 % (ref 11.5–14.5)
GLUCOSE SERPL-MCNC: 184 MG/DL (ref 74–99)
HCT VFR BLD AUTO: 29 % (ref 41–52)
HGB BLD-MCNC: 9.4 G/DL (ref 13.5–17.5)
IMM GRANULOCYTES # BLD AUTO: 0.03 X10*3/UL (ref 0–0.7)
IMM GRANULOCYTES NFR BLD AUTO: 0.3 % (ref 0–0.9)
INR PPP: 1.4 (ref 0.9–1.1)
LYMPHOCYTES # BLD AUTO: 1.5 X10*3/UL (ref 1.2–4.8)
LYMPHOCYTES NFR BLD AUTO: 15.3 %
MAGNESIUM SERPL-MCNC: 1.67 MG/DL (ref 1.6–2.4)
MCH RBC QN AUTO: 28.6 PG (ref 26–34)
MCHC RBC AUTO-ENTMCNC: 32.4 G/DL (ref 32–36)
MCV RBC AUTO: 88 FL (ref 80–100)
MONOCYTES # BLD AUTO: 0.7 X10*3/UL (ref 0.1–1)
MONOCYTES NFR BLD AUTO: 7.1 %
NEUTROPHILS # BLD AUTO: 7.56 X10*3/UL (ref 1.2–7.7)
NEUTROPHILS NFR BLD AUTO: 77.2 %
NRBC BLD-RTO: 3.2 /100 WBCS (ref 0–0)
PHOSPHATE SERPL-MCNC: 2.2 MG/DL (ref 2.5–4.9)
PLATELET # BLD AUTO: 58 X10*3/UL (ref 150–450)
POTASSIUM SERPL-SCNC: 3.9 MMOL/L (ref 3.5–5.3)
PROT SERPL-MCNC: 6 G/DL (ref 6.4–8.2)
PROTHROMBIN TIME: 15.4 SECONDS (ref 9.8–12.8)
RBC # BLD AUTO: 3.29 X10*6/UL (ref 4.5–5.9)
SODIUM SERPL-SCNC: 135 MMOL/L (ref 136–145)
WBC # BLD AUTO: 9.8 X10*3/UL (ref 4.4–11.3)

## 2024-09-22 PROCEDURE — 36415 COLL VENOUS BLD VENIPUNCTURE: CPT

## 2024-09-22 PROCEDURE — 97530 THERAPEUTIC ACTIVITIES: CPT | Mod: GP,CQ

## 2024-09-22 PROCEDURE — 2500000001 HC RX 250 WO HCPCS SELF ADMINISTERED DRUGS (ALT 637 FOR MEDICARE OP)

## 2024-09-22 PROCEDURE — 2500000005 HC RX 250 GENERAL PHARMACY W/O HCPCS

## 2024-09-22 PROCEDURE — 85025 COMPLETE CBC W/AUTO DIFF WBC: CPT

## 2024-09-22 PROCEDURE — 84100 ASSAY OF PHOSPHORUS: CPT

## 2024-09-22 PROCEDURE — 82435 ASSAY OF BLOOD CHLORIDE: CPT

## 2024-09-22 PROCEDURE — 1100000001 HC PRIVATE ROOM DAILY

## 2024-09-22 PROCEDURE — 83735 ASSAY OF MAGNESIUM: CPT

## 2024-09-22 PROCEDURE — 84075 ASSAY ALKALINE PHOSPHATASE: CPT

## 2024-09-22 PROCEDURE — 2500000004 HC RX 250 GENERAL PHARMACY W/ HCPCS (ALT 636 FOR OP/ED)

## 2024-09-22 PROCEDURE — 85610 PROTHROMBIN TIME: CPT

## 2024-09-22 PROCEDURE — 99231 SBSQ HOSP IP/OBS SF/LOW 25: CPT

## 2024-09-22 ASSESSMENT — COGNITIVE AND FUNCTIONAL STATUS - GENERAL
WALKING IN HOSPITAL ROOM: TOTAL
TURNING FROM BACK TO SIDE WHILE IN FLAT BAD: A LOT
TOILETING: A LOT
MOVING TO AND FROM BED TO CHAIR: TOTAL
HELP NEEDED FOR BATHING: A LITTLE
MOVING TO AND FROM BED TO CHAIR: A LOT
MOVING FROM LYING ON BACK TO SITTING ON SIDE OF FLAT BED WITH BEDRAILS: A LOT
STANDING UP FROM CHAIR USING ARMS: A LOT
MOBILITY SCORE: 14
DRESSING REGULAR LOWER BODY CLOTHING: A LOT
WALKING IN HOSPITAL ROOM: A LOT
PERSONAL GROOMING: A LITTLE
MOVING FROM LYING ON BACK TO SITTING ON SIDE OF FLAT BED WITH BEDRAILS: A LITTLE
DRESSING REGULAR UPPER BODY CLOTHING: A LITTLE
DAILY ACTIVITIY SCORE: 17
MOBILITY SCORE: 9
TURNING FROM BACK TO SIDE WHILE IN FLAT BAD: A LITTLE
CLIMB 3 TO 5 STEPS WITH RAILING: TOTAL
STANDING UP FROM CHAIR USING ARMS: A LOT
CLIMB 3 TO 5 STEPS WITH RAILING: A LOT

## 2024-09-22 ASSESSMENT — PAIN SCALES - GENERAL
PAINLEVEL_OUTOF10: 6
PAINLEVEL_OUTOF10: 5 - MODERATE PAIN
PAINLEVEL_OUTOF10: 8
PAINLEVEL_OUTOF10: 9
PAINLEVEL_OUTOF10: 8
PAINLEVEL_OUTOF10: 6
PAINLEVEL_OUTOF10: 8

## 2024-09-22 ASSESSMENT — PAIN - FUNCTIONAL ASSESSMENT
PAIN_FUNCTIONAL_ASSESSMENT: 0-10

## 2024-09-22 NOTE — PROGRESS NOTES
Physical Therapy    Physical Therapy Treatment    Patient Name: Manuel Bowser  MRN: [id number]  Department: [place] [address]  Room: 2067/206[facility]  Today's Date: 9/22/2024  Time Calculation  Start Time: 1252  Stop Time: 1316  Time Calculation (min): 24 min         Assessment/Plan   PT Assessment  PT Assessment Results: Decreased strength, Decreased endurance, Impaired balance, Decreased mobility, Decreased cognition  End of Session Communication: Bedside nurse  Assessment Comment: Pt with very limited effort this date and required max encouragement to participate. Pt unable to achieve stand this date despite multiple attempts. Pt remains appropriate for Mid intensity therapy upon discharge.  End of Session Patient Position: Bed, 4 rail up, Alarm on     PT Plan  Treatment/Interventions: Bed mobility, Transfer training, Gait training, Balance training, Neuromuscular re-education, Strengthening, Endurance training, Therapeutic exercise, Therapeutic activity, Home exercise program, Positioning, Postural re-education  PT Plan: Ongoing PT  PT Frequency: 3 times per week  PT Discharge Recommendations: Moderate intensity level of continued care  PT Recommended Transfer Status: Assist x2, Assistive device  PT - OK to Discharge: Yes (PT eval complete and DC rec made)      General Visit Information:   PT  Visit  PT Received On: 09/22/24  General  Prior to Session Communication: Bedside nurse  Patient Position Received: Bed, 4 rail up, Alarm on  General Comment: Pt supine in bed upon arrival. Pt with minimal responses to questions. Pt required max encouragement to participate in therapy.    Subjective   Precautions:       Vital Signs (Past 2hrs)        Date/Time Vitals Session Patient Position Pulse Resp SpO2 BP MAP (mmHg)    09/22/24 1148 --  --  79  --  100 %  109/76  87     09/22/24 1252 Post PT  --  --  --  --  91/61  --                         Objective   Pain:     Cognition:  Cognition  Arousal/Alertness: Delayed responses to  stimuli  Cognition Comments: Pt with minimal responses to questions, only following one step commands  Coordination:       Activity Tolerance:  Activity Tolerance  Endurance: Decreased tolerance for upright activites  Treatments:  Therapeutic Activity  Therapeutic Activity Performed: Yes  Therapeutic Activity 1: Pt sat EOB for approx. 6 min with B feet flat on ground and using B UE on FWW for support.  Therapeutic Activity 2: Attempted multiple stand trials, pt unable to achieve stand, pt with very limited effort despite encouragement.  Therapeutic Activity 3: Pt completed bed mobility, sitting EOB, and scooting. Pt requires max encouragement throughout treatment, pt giving very limited effort.    Bed Mobility  Bed Mobility: Yes  Bed Mobility 1  Bed Mobility 1: Supine to sitting  Level of Assistance 1: Moderate assistance  Bed Mobility Comments 1: HOB elevated, use of bed rail.  Bed Mobility 2  Bed Mobility  2: Sitting to supine  Level of Assistance 2: Contact guard  Bed Mobility 3  Bed Mobility 3: Scooting  Level of Assistance 3: Minimum assistance  Bed Mobility Comments 3: Cues for hand placement    Ambulation/Gait Training  Ambulation/Gait Training Performed: No (Pt unable to achieve stand.)  Transfers  Transfer: No (Multiple attempts, pt cued in proper hand placement and trunk management. Pt with very limited effort.)    Outcome Measures:  [facility] Basic Mobility  Turning from your back to your side while in a flat bed without using bedrails: A lot  Moving from lying on your back to sitting on the side of a flat bed without using bedrails: A lot  Moving to and from bed to chair (including a wheelchair): Total  Standing up from a chair using your arms (e.g. wheelchair or bedside chair): A lot  To walk in hospital room: Total  Climbing 3-5 steps with railing: Total  Basic Mobility - Total Score: 9    Education Documentation  Mobility Training, taught by Светлана Hernandez PTA at 9/22/2024  1:36 PM.  Learner:  Patient  Readiness: Acceptance  Method: Explanation  Response: Demonstrated Understanding, Needs Reinforcement    Education Comments  No comments found.        OP EDUCATION:       Encounter Problems       Encounter Problems (Active)       Mobility       Pt will perform bed mobility with cga assist.   (Progressing)       Start:  09/17/24    Expected End:  10/01/24            Pt will ambulate >50ft with LRAD and cga Assist with no LOB and VSS.   (Progressing)       Start:  09/17/24    Expected End:  10/01/24            Pt will demonstrate WFL BLE strength to complete therapeutic exercise and participate in functional mobility.   (Progressing)       Start:  09/17/24    Expected End:  10/01/24               PT Transfers       Pt will perform all functional transfers with LRAD and cga assist.   (Progressing)       Start:  09/17/24    Expected End:  10/01/24

## 2024-09-22 NOTE — PROGRESS NOTES
Manuel Bowser is a 63 y.o. male on day 9 of admission presenting with Alcoholic liver failure (Multi).      Subjective   No acute events overnight. Patient reporting abdominal pain and some SOB this morning but unclear if these are new complaints. Does not appear to be in acute distress. Currently on Dilaudid for pain control. Three bowel movements charted overnight. No reported chest pain or other complaints at this time.      Objective     Last Recorded Vitals  /70   Pulse 78   Temp 36.3 °C (97.3 °F)   Resp 16   Wt 74.5 kg (164 lb 3.9 oz)   SpO2 96%   Intake/Output last 3 Shifts:  No intake or output data in the 24 hours ending 09/22/24 1601      Admission Weight  Weight: 67.5 kg (148 lb 13 oz) (09/13/24 0100)    Daily Weight  09/22/24 : 74.5 kg (164 lb 3.9 oz)      Physical Exam  Constitutional:       General: He is not in acute distress. A&O x4. Has somewhat flat affect, makes seldom eye contact.     Appearance: He is not diaphoretic.   Cardiovascular:      Rate and Rhythm: Normal rate and regular rhythm.      Heart sounds: No murmur heard.  Pulmonary:      Effort: No respiratory distress.      Breath sounds: No rhonchi or rales.   Abdominal:      General: slightly distended abdomen       Palpations: There is no mass.      Tenderness: Epigastric abdominal tenderness. There is no guarding or rebound.   Musculoskeletal:         General: No swelling.      Right lower leg: No edema.      Left lower leg: No edema.   Skin:     Capillary Refill: Capillary refill takes less than 2 seconds.      Coloration: Skin is pale. Skin is not jaundiced.      Findings: No bruising.      Right jugular   Neurological:      General: No focal deficit present.      Mental Status: He is alert and oriented to person, place, and time.      Relevant Results  Results for orders placed or performed during the hospital encounter of 09/13/24 (from the past 24 hour(s))   CBC and Auto Differential   Result Value Ref Range    WBC 9.8  4.4 - 11.3 x10*3/uL    nRBC 3.2 (H) 0.0 - 0.0 /100 WBCs    RBC 3.29 (L) 4.50 - 5.90 x10*6/uL    Hemoglobin 9.4 (L) 13.5 - 17.5 g/dL    Hematocrit 29.0 (L) 41.0 - 52.0 %    MCV 88 80 - 100 fL    MCH 28.6 26.0 - 34.0 pg    MCHC 32.4 32.0 - 36.0 g/dL    RDW 18.1 (H) 11.5 - 14.5 %    Platelets 58 (L) 150 - 450 x10*3/uL    Neutrophils % 77.2 40.0 - 80.0 %    Immature Granulocytes %, Automated 0.3 0.0 - 0.9 %    Lymphocytes % 15.3 13.0 - 44.0 %    Monocytes % 7.1 2.0 - 10.0 %    Eosinophils % 0.1 0.0 - 6.0 %    Basophils % 0.0 0.0 - 2.0 %    Neutrophils Absolute 7.56 1.20 - 7.70 x10*3/uL    Immature Granulocytes Absolute, Automated 0.03 0.00 - 0.70 x10*3/uL    Lymphocytes Absolute 1.50 1.20 - 4.80 x10*3/uL    Monocytes Absolute 0.70 0.10 - 1.00 x10*3/uL    Eosinophils Absolute 0.01 0.00 - 0.70 x10*3/uL    Basophils Absolute 0.00 0.00 - 0.10 x10*3/uL   Coagulation Screen   Result Value Ref Range    Protime 15.4 (H) 9.8 - 12.8 seconds    INR 1.4 (H) 0.9 - 1.1    aPTT 30 27 - 38 seconds   Magnesium   Result Value Ref Range    Magnesium 1.67 1.60 - 2.40 mg/dL   Hepatic function panel   Result Value Ref Range    Albumin 1.9 (L) 3.4 - 5.0 g/dL    Bilirubin, Total 4.3 (H) 0.0 - 1.2 mg/dL    Bilirubin, Direct 2.5 (H) 0.0 - 0.3 mg/dL    Alkaline Phosphatase 505 (H) 33 - 136 U/L    ALT 54 (H) 10 - 52 U/L    AST 63 (H) 9 - 39 U/L    Total Protein 6.0 (L) 6.4 - 8.2 g/dL   Phosphorus   Result Value Ref Range    Phosphorus 2.2 (L) 2.5 - 4.9 mg/dL   Basic Metabolic Panel   Result Value Ref Range    Glucose 184 (H) 74 - 99 mg/dL    Sodium 135 (L) 136 - 145 mmol/L    Potassium 3.9 3.5 - 5.3 mmol/L    Chloride 102 98 - 107 mmol/L    Bicarbonate 25 21 - 32 mmol/L    Anion Gap 12 10 - 20 mmol/L    Urea Nitrogen 7 6 - 23 mg/dL    Creatinine 0.84 0.50 - 1.30 mg/dL    eGFR >90 >60 mL/min/1.73m*2    Calcium 7.8 (L) 8.6 - 10.6 mg/dL          Assessment/Plan      Assessment & Plan  Alcoholic liver failure (Multi)    Acute on chronic alcoholic  liver disease (Multi)    Hepatic encephalopathy (Multi)    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 He was continued on lactulose and acetylcysteine on transfer to  MICU. Admission was c/b supratherapeutic INR, hepatic encephalopathy and c/f hemolytic anemia, which have resolved. Pt underwent transjugular liver bx, paracentesis, and hepatic pressure cath on 9/16, c/w portal hypertension. Awaiting liver bx results and pt to follow up with hepatology outpatient. Pending SNF placement.    Updates 9/22:  - Outpatient hepatology appt w/ Dr. Ansari scheduled for 10/9/2024  - Awaiting SNF placement; will not resume ASA or statin on discharge given thrombocytopenia, recent coagulopathy with INR >9, and acute liver injury     #Acute liver injury/failure, possible acute on chronic liver disease  #HE, resolved   #Coagulopathy, resolved   -Hx of chronic alcohol use disorder  -Patient presents with acute liver injury with INR >9.5 +/- failure given reported encephalopathy with A&Ox2 at OSH. Currently A&Ox4, no asterixis or c/f current encephalopathy though has been treated with lactulose  -Hepatic function panel severely deranged-Alb-1.7,ALP-804,ALT-278,AST-945  -Hepatitis panel, A1AT, Ceruloplasmin,HSV1&2, Acetaminophen level,AFP,Fibrinogen,MICHAEL are all within normal limits  -Ferritin>9000,serum iron-85 WNL  -EBV DNA<35 detected  -IgG-1600  -AMA-29.3 (uln 24.9, >25 is positive), repeat AMA-9/14 is negative  -liver US shows echogenic appearance of the liver compatible with steatosis and normal Doppler interrogation of the hepatic vasculature. There is mild abdominal ascites and a  right pleural effusion   -INR initially as high as 9 on admission, now stable at 1.3  Plan:  -Hepatology signing off, pt to follow up outpatient on 10/9/24  -will follow up on pending hepatic work up  -continue with lactulose 20g TID, titrate prn to 3-4 BM/day   -Monitor Hgb, Coags, signs of bleed  -Daily MELD labs    #Acute anaemia/concern for hemolysis  -Hgb 12.0 to 9.5   - pale but not jaundiced  -Tbili-2.7, indirect-1.4, LDH-846, Haptoglobin<30  -peripheral blood smear-Mild microcytosis with many target cells and many Pappenheimer bodies. Findings consistent with hemoglobinopathy.   -Hb remains stable   Plan:  -monitor Hb     # Traumatic catheterization  -Difficult catheterization at MICU with bleeding.  -patient accidentally urinated without difficulty  Plan:  -Urology consulted; hematuria felt to be due more to supratherapeutic INR than urethral trauma  -Pt voiding on his own; will defer butts or bladder scans unless develops retention     #HFpEF  #Tachycardia  -limited ECHO -9/13 shows EF of 78%  -CXR without signs of pulmonary edema  -Tachycardia resolved  -on home metop 25mg bid     Disposition: pending SNF placement w/ outpatient hepatology follow up       FEN  Fluids: PRN  Electrolytes: K>4, Mg>2  Nutrition: Adult regular diet  Prophylaxis:  DVT ppx: chem ppx held iso coagulopathy, SCDs  GI ppx: PPI  Bowel care: lactulose  Hardware:                        Social:  Code: Full Code    HPOA: Mayela (wife) [phone]  Disposition: SNF    Patient seen and discussed with attending physician, Dr. Wise.     Tate Long MD  Internal Medicine PGY-1

## 2024-09-22 NOTE — CARE PLAN
The patient's goals for the shift include Pain control to 4-6 by the end of the shift    The clinical goals for the shift include   Problem: Fall/Injury  Goal: Not fall by end of shift  Outcome: Progressing  Goal: Be free from injury by end of the shift  Outcome: Progressing  Goal: Verbalize understanding of personal risk factors for fall in the hospital  Outcome: Progressing  Goal: Verbalize understanding of risk factor reduction measures to prevent injury from fall in the home  Outcome: Progressing  Goal: Use assistive devices by end of the shift  Outcome: Progressing  Goal: Pace activities to prevent fatigue by end of the shift  Outcome: Progressing     Problem: Skin  Goal: Decreased wound size/increased tissue granulation at next dressing change  Outcome: Progressing  Flowsheets (Taken 9/22/2024 0744)  Decreased wound size/increased tissue granulation at next dressing change: Promote sleep for wound healing  Goal: Participates in plan/prevention/treatment measures  Outcome: Progressing  Flowsheets (Taken 9/22/2024 0744)  Participates in plan/prevention/treatment measures: Elevate heels  Goal: Prevent/manage excess moisture  Outcome: Progressing  Flowsheets (Taken 9/22/2024 0744)  Prevent/manage excess moisture:   Cleanse incontinence/protect with barrier cream   Monitor for/manage infection if present   Follow provider orders for dressing changes  Goal: Prevent/minimize sheer/friction injuries  Outcome: Progressing  Flowsheets (Taken 9/22/2024 0744)  Prevent/minimize sheer/friction injuries:   HOB 30 degrees or less   Turn/reposition every 2 hours/use positioning/transfer devices   Use pull sheet  Goal: Promote/optimize nutrition  Outcome: Progressing  Flowsheets (Taken 9/22/2024 0744)  Promote/optimize nutrition:   Assist with feeding   Consume > 50% meals/supplements   Offer water/supplements/favorite foods  Goal: Promote skin healing  Outcome: Progressing  Flowsheets (Taken 9/22/2024 0744)  Promote skin  healing:   Assess skin/pad under line(s)/device(s)   Turn/reposition every 2 hours/use positioning/transfer devices     Problem: Pain  Goal: Takes deep breaths with improved pain control throughout the shift  Outcome: Progressing  Goal: Turns in bed with improved pain control throughout the shift  Outcome: Progressing  Goal: Walks with improved pain control throughout the shift  Outcome: Progressing  Goal: Performs ADL's with improved pain control throughout shift  Outcome: Progressing  Goal: Participates in PT with improved pain control throughout the shift  Outcome: Progressing  Goal: Free from opioid side effects throughout the shift  Outcome: Progressing  Goal: Free from acute confusion related to pain meds throughout the shift  Outcome: Progressing

## 2024-09-22 NOTE — CARE PLAN
The patient's goals for the shift include Pain control to 4-6 by the end of the shift    The clinical goals for the shift include Patient will remain free from falls, by the end of this shift.    Patient remained free from injuries during this shift.    Patient in in bed, HOB elevated, call light within reach.    Plan of care ongoing.

## 2024-09-23 LAB
ACANTHOCYTES BLD QL SMEAR: NORMAL
ALBUMIN SERPL BCP-MCNC: 1.6 G/DL (ref 3.4–5)
ALP SERPL-CCNC: 412 U/L (ref 33–136)
ALT SERPL W P-5'-P-CCNC: 41 U/L (ref 10–52)
ANION GAP SERPL CALC-SCNC: 12 MMOL/L (ref 10–20)
APTT PPP: 29 SECONDS (ref 27–38)
AST SERPL W P-5'-P-CCNC: 50 U/L (ref 9–39)
BASOPHILS # BLD AUTO: 0.01 X10*3/UL (ref 0–0.1)
BASOPHILS NFR BLD AUTO: 0.1 %
BILIRUB DIRECT SERPL-MCNC: 2 MG/DL (ref 0–0.3)
BILIRUB SERPL-MCNC: 3.7 MG/DL (ref 0–1.2)
BUN SERPL-MCNC: 7 MG/DL (ref 6–23)
BURR CELLS BLD QL SMEAR: NORMAL
CALCIUM SERPL-MCNC: 7.2 MG/DL (ref 8.6–10.6)
CHLORIDE SERPL-SCNC: 102 MMOL/L (ref 98–107)
CO2 SERPL-SCNC: 23 MMOL/L (ref 21–32)
CREAT SERPL-MCNC: 0.88 MG/DL (ref 0.5–1.3)
EGFRCR SERPLBLD CKD-EPI 2021: >90 ML/MIN/1.73M*2
EOSINOPHIL # BLD AUTO: 0.02 X10*3/UL (ref 0–0.7)
EOSINOPHIL NFR BLD AUTO: 0.2 %
ERYTHROCYTE [DISTWIDTH] IN BLOOD BY AUTOMATED COUNT: 18.7 % (ref 11.5–14.5)
GLUCOSE SERPL-MCNC: 143 MG/DL (ref 74–99)
HCT VFR BLD AUTO: 26.9 % (ref 41–52)
HGB BLD-MCNC: 8.6 G/DL (ref 13.5–17.5)
IMM GRANULOCYTES # BLD AUTO: 0.02 X10*3/UL (ref 0–0.7)
IMM GRANULOCYTES NFR BLD AUTO: 0.2 % (ref 0–0.9)
INR PPP: 1.5 (ref 0.9–1.1)
LYMPHOCYTES # BLD AUTO: 1.62 X10*3/UL (ref 1.2–4.8)
LYMPHOCYTES NFR BLD AUTO: 18.7 %
MAGNESIUM SERPL-MCNC: 1.69 MG/DL (ref 1.6–2.4)
MCH RBC QN AUTO: 28.3 PG (ref 26–34)
MCHC RBC AUTO-ENTMCNC: 32 G/DL (ref 32–36)
MCV RBC AUTO: 89 FL (ref 80–100)
MONOCYTES # BLD AUTO: 0.59 X10*3/UL (ref 0.1–1)
MONOCYTES NFR BLD AUTO: 6.8 %
NEUTROPHILS # BLD AUTO: 6.41 X10*3/UL (ref 1.2–7.7)
NEUTROPHILS NFR BLD AUTO: 74 %
NRBC BLD-RTO: 4.4 /100 WBCS (ref 0–0)
PAPPENHEIMER BOD BLD QL SMEAR: PRESENT
PHOSPHATE SERPL-MCNC: 2.3 MG/DL (ref 2.5–4.9)
PLATELET # BLD AUTO: 52 X10*3/UL (ref 150–450)
POLYCHROMASIA BLD QL SMEAR: NORMAL
POTASSIUM SERPL-SCNC: 4.2 MMOL/L (ref 3.5–5.3)
PROT SERPL-MCNC: 5.1 G/DL (ref 6.4–8.2)
PROTHROMBIN TIME: 16.4 SECONDS (ref 9.8–12.8)
RBC # BLD AUTO: 3.04 X10*6/UL (ref 4.5–5.9)
RBC MORPH BLD: NORMAL
SODIUM SERPL-SCNC: 133 MMOL/L (ref 136–145)
TARGETS BLD QL SMEAR: NORMAL
WBC # BLD AUTO: 8.7 X10*3/UL (ref 4.4–11.3)

## 2024-09-23 PROCEDURE — 84100 ASSAY OF PHOSPHORUS: CPT

## 2024-09-23 PROCEDURE — 83735 ASSAY OF MAGNESIUM: CPT

## 2024-09-23 PROCEDURE — 97530 THERAPEUTIC ACTIVITIES: CPT | Mod: GO | Performed by: OCCUPATIONAL THERAPIST

## 2024-09-23 PROCEDURE — 97530 THERAPEUTIC ACTIVITIES: CPT | Mod: GP,CQ

## 2024-09-23 PROCEDURE — 2500000001 HC RX 250 WO HCPCS SELF ADMINISTERED DRUGS (ALT 637 FOR MEDICARE OP)

## 2024-09-23 PROCEDURE — 85610 PROTHROMBIN TIME: CPT

## 2024-09-23 PROCEDURE — 99231 SBSQ HOSP IP/OBS SF/LOW 25: CPT

## 2024-09-23 PROCEDURE — 2500000005 HC RX 250 GENERAL PHARMACY W/O HCPCS

## 2024-09-23 PROCEDURE — 2500000004 HC RX 250 GENERAL PHARMACY W/ HCPCS (ALT 636 FOR OP/ED)

## 2024-09-23 PROCEDURE — 97110 THERAPEUTIC EXERCISES: CPT | Mod: GP,CQ

## 2024-09-23 PROCEDURE — 36415 COLL VENOUS BLD VENIPUNCTURE: CPT

## 2024-09-23 PROCEDURE — 84075 ASSAY ALKALINE PHOSPHATASE: CPT

## 2024-09-23 PROCEDURE — 2500000001 HC RX 250 WO HCPCS SELF ADMINISTERED DRUGS (ALT 637 FOR MEDICARE OP): Performed by: STUDENT IN AN ORGANIZED HEALTH CARE EDUCATION/TRAINING PROGRAM

## 2024-09-23 PROCEDURE — 97535 SELF CARE MNGMENT TRAINING: CPT | Mod: GO | Performed by: OCCUPATIONAL THERAPIST

## 2024-09-23 PROCEDURE — 1100000001 HC PRIVATE ROOM DAILY

## 2024-09-23 PROCEDURE — 85025 COMPLETE CBC W/AUTO DIFF WBC: CPT

## 2024-09-23 RX ORDER — KETOROLAC TROMETHAMINE 10 MG/1
10 TABLET, FILM COATED ORAL EVERY 8 HOURS PRN
Status: DISCONTINUED | OUTPATIENT
Start: 2024-09-23 | End: 2024-09-25

## 2024-09-23 RX ORDER — MAGNESIUM SULFATE HEPTAHYDRATE 40 MG/ML
2 INJECTION, SOLUTION INTRAVENOUS ONCE
Status: COMPLETED | OUTPATIENT
Start: 2024-09-23 | End: 2024-09-23

## 2024-09-23 RX ORDER — OXYCODONE HYDROCHLORIDE 5 MG/1
5 TABLET ORAL EVERY 4 HOURS PRN
Status: DISCONTINUED | OUTPATIENT
Start: 2024-09-23 | End: 2024-10-01 | Stop reason: HOSPADM

## 2024-09-23 RX ORDER — OXYCODONE HYDROCHLORIDE 5 MG/1
10 TABLET ORAL EVERY 4 HOURS PRN
Status: DISCONTINUED | OUTPATIENT
Start: 2024-09-23 | End: 2024-10-01 | Stop reason: HOSPADM

## 2024-09-23 ASSESSMENT — COGNITIVE AND FUNCTIONAL STATUS - GENERAL
DRESSING REGULAR LOWER BODY CLOTHING: A LOT
TOILETING: A LOT
DAILY ACTIVITIY SCORE: 14
HELP NEEDED FOR BATHING: A LOT
TURNING FROM BACK TO SIDE WHILE IN FLAT BAD: A LITTLE
TURNING FROM BACK TO SIDE WHILE IN FLAT BAD: A LITTLE
EATING MEALS: A LITTLE
EATING MEALS: A LITTLE
TOILETING: A LOT
TURNING FROM BACK TO SIDE WHILE IN FLAT BAD: A LITTLE
PERSONAL GROOMING: A LITTLE
HELP NEEDED FOR BATHING: A LITTLE
CLIMB 3 TO 5 STEPS WITH RAILING: A LOT
STANDING UP FROM CHAIR USING ARMS: A LOT
WALKING IN HOSPITAL ROOM: A LOT
MOVING FROM LYING ON BACK TO SITTING ON SIDE OF FLAT BED WITH BEDRAILS: A LITTLE
MOVING TO AND FROM BED TO CHAIR: A LOT
MOVING TO AND FROM BED TO CHAIR: TOTAL
WALKING IN HOSPITAL ROOM: TOTAL
STANDING UP FROM CHAIR USING ARMS: A LOT
MOBILITY SCORE: 14
MOBILITY SCORE: 11
CLIMB 3 TO 5 STEPS WITH RAILING: A LOT
MOVING TO AND FROM BED TO CHAIR: A LOT
MOBILITY SCORE: 14
STANDING UP FROM CHAIR USING ARMS: A LOT
PERSONAL GROOMING: A LITTLE
MOVING FROM LYING ON BACK TO SITTING ON SIDE OF FLAT BED WITH BEDRAILS: A LITTLE
DRESSING REGULAR UPPER BODY CLOTHING: A LOT
HELP NEEDED FOR BATHING: A LOT
TOILETING: A LOT
MOVING FROM LYING ON BACK TO SITTING ON SIDE OF FLAT BED WITH BEDRAILS: A LITTLE
DAILY ACTIVITIY SCORE: 14
PERSONAL GROOMING: A LITTLE
CLIMB 3 TO 5 STEPS WITH RAILING: TOTAL
DRESSING REGULAR LOWER BODY CLOTHING: A LOT
DRESSING REGULAR UPPER BODY CLOTHING: A LOT
WALKING IN HOSPITAL ROOM: A LOT
DRESSING REGULAR UPPER BODY CLOTHING: A LITTLE
DRESSING REGULAR LOWER BODY CLOTHING: A LOT
DAILY ACTIVITIY SCORE: 17

## 2024-09-23 ASSESSMENT — ACTIVITIES OF DAILY LIVING (ADL): HOME_MANAGEMENT_TIME_ENTRY: 14

## 2024-09-23 ASSESSMENT — PAIN SCALES - GENERAL
PAINLEVEL_OUTOF10: 8
PAINLEVEL_OUTOF10: 5 - MODERATE PAIN
PAINLEVEL_OUTOF10: 5 - MODERATE PAIN
PAINLEVEL_OUTOF10: 8
PAINLEVEL_OUTOF10: 5 - MODERATE PAIN
PAINLEVEL_OUTOF10: 0 - NO PAIN
PAINLEVEL_OUTOF10: 7

## 2024-09-23 ASSESSMENT — PAIN - FUNCTIONAL ASSESSMENT
PAIN_FUNCTIONAL_ASSESSMENT: 0-10

## 2024-09-23 ASSESSMENT — PAIN DESCRIPTION - LOCATION
LOCATION: ABDOMEN

## 2024-09-23 NOTE — PROGRESS NOTES
"Manuel Bowser is a 63 y.o. male on day 10 of admission presenting with Alcoholic liver failure (Multi).    Subjective   NAEO, no concerns from nursing.    Pt. is doing well this morning. No fever, chills, chest pain, shortness of breath, or abdominal pain.    Review of systems: negative, except as above.      Objective   Last Recorded Vitals  Blood pressure 106/73, pulse 74, temperature 36.4 °C (97.5 °F), temperature source Temporal, resp. rate 18, height 1.753 m (5' 9\"), weight 74.1 kg (163 lb 5.8 oz), SpO2 99%.    Intake/Output last 3 Shifts:  I/O last 3 completed shifts:  In: - (0 mL/kg)   Out: 100 (1.3 mL/kg) [Urine:100 (0 mL/kg/hr)]  Weight: 74.1 kg     Physical Exam  Vitals reviewed.   Constitutional:       General: He is not in acute distress.     Appearance: Normal appearance. He is not toxic-appearing.   HENT:      Head: Normocephalic and atraumatic.      Mouth/Throat:      Mouth: Mucous membranes are moist.   Eyes:      Extraocular Movements: Extraocular movements intact.   Cardiovascular:      Rate and Rhythm: Normal rate and regular rhythm.      Pulses: Normal pulses.      Heart sounds: Normal heart sounds. No murmur heard.  Pulmonary:      Effort: Pulmonary effort is normal. No respiratory distress.      Breath sounds: Normal breath sounds. No stridor. No wheezing, rhonchi or rales.   Abdominal:      General: Abdomen is flat. Bowel sounds are normal.      Palpations: Abdomen is soft.      Tenderness: There is no abdominal tenderness. There is no guarding.   Musculoskeletal:         General: Normal range of motion.   Skin:     General: Skin is warm.      Capillary Refill: Capillary refill takes less than 2 seconds.      Coloration: Skin is not jaundiced.      Findings: No bruising.   Neurological:      General: No focal deficit present.      Mental Status: He is alert and oriented to person, place, and time.     Relevant Results  Scheduled medications  pantoprazole, 40 mg, oral, Daily before " breakfast   Or  esomeprazole, 40 mg, nasoduodenal tube, Daily before breakfast   Or  pantoprazole, 40 mg, intravenous, Daily before breakfast  lactulose, 20 g, oral, TID  levothyroxine, 125 mcg, oral, Daily  lidocaine, 1 Application, urethral, Once  lidocaine, 1 patch, transdermal, Daily  melatonin, 6 mg, oral, Daily  metoprolol tartrate, 25 mg, oral, BID    Continuous medications     PRN medications  PRN medications: ketorolac, oxyCODONE **OR** oxyCODONE      Assessment/Plan   Alcoholic liver failure (Multi)    Manuel Bowser is a 63 y.o. male with PMH of DM, HTN, HFpEF, alcohol use disorder, pancreatitis who is admitted for evaluation of acute liver injury vs. failure. Complications of such, including hepatic encephalopathy and coagulopathy, have resolved. S/p transjugular liver bx, paracentesis, and hepatic pressure cath on 9/16, c/w portal hypertension. Liver biopsy results will be followed up OP with hepatology. Currently medically ready for discharge to SNF but pending precert. Obtaining SLP consult for dysphagia observed by primary RN today.    09/23/24 Updates  - Working on precert for SNF  - Obtain SLP consult for dysphagia    Acute problems  # Acute liver injury/failure, possible acute on chronic liver disease  # Hepatic Encephalopathy [Resolved]  # Coagulopathy [Resolved]  -Hx of chronic alcohol use disorder  -Patient presents with acute liver injury with INR >9.5 +/- failure given reported encephalopathy with A&Ox2 at OSH. Currently A&Ox4, no asterixis or c/f current encephalopathy though has been treated with lactulose  -Hepatic function panel severely deranged-Alb-1.7,ALP-804,ALT-278,AST-945  -Hepatitis panel, A1AT, Ceruloplasmin,HSV1&2, Acetaminophen level,AFP,Fibrinogen,MICHAEL are all within normal limits  -Ferritin>9000,serum iron-85 WNL  -EBV DNA<35 detected  -IgG-1600  -AMA-29.3 (uln 24.9, >25 is positive), repeat AMA-9/14 is negative  -liver US shows echogenic appearance of the liver compatible  with steatosis and normal Doppler interrogation of the hepatic vasculature. There is mild abdominal ascites and a right pleural effusion   -INR initially as high as 9 on admission, now stable at 1.3  Plan:  -Hepatology signing off, pt to follow up outpatient on 10/9/24  -will follow up on pending hepatic work up  -continue with lactulose 20g TID, titrate prn to 3-4 BM/day   -Monitor Hgb, Coags, signs of bleed  -Daily MELD labs    #Acute anaemia/concern for hemolysis  -Hgb 12.0 to 9.5   - pale but not jaundiced  -Tbili-2.7, indirect-1.4, LDH-846, Haptoglobin<30  -peripheral blood smear-Mild microcytosis with many target cells and many Pappenheimer bodies. Findings consistent with hemoglobinopathy.   -Hb remains stable   Plan:  -monitor Hb     # Traumatic catheterization  -Difficult catheterization at MICU with bleeding.  -patient accidentally urinated without difficulty  Plan:  -Urology consulted; hematuria felt to be due more to supratherapeutic INR than urethral trauma  -Pt voiding on his own; will defer butts or bladder scans unless develops retention    Chronic problems  #HFpEF  #Tachycardia  -limited ECHO -9/13 shows EF of 78%  -CXR without signs of pulmonary edema  -Tachycardia resolved  -on home metop 25mg bid    Prevention of hospital-associated complications:  DVT Ppx: SCDs  Diet: Regular  GI PPx: PPI  Bowel Regimen: Scheduled lactulose  Butts: No  IVF: None  Access/Lines: PIVs  Telemetry: No  Physical Therapy Consult Needed: Yes  Antimicrobials: None    DISPOSITION: SNF pending precert    CODE STATUS: Full Code    NOK: Mayela Bowser (Spouse) [phone]       Shiva Guzmán MD  PGY-1 Resident Physician  Department of Anesthesiology & Perioperative Medicine

## 2024-09-23 NOTE — CARE PLAN
The patient's goals for the shift include Pain control to 4-6 by the end of the shift    The clinical goals for the shift include Patient will remain free from falls, by the end of this shift.    Patient remained free from injuries during this shift.    Patient is in bed, HOB elevated, call light within reach, and bed alarm is activated and audible.    Plan of care ongoing.     Problem: Pain  Goal: Turns in bed with improved pain control throughout the shift  Outcome: Not Progressing  Goal: Walks with improved pain control throughout the shift  Outcome: Not Progressing

## 2024-09-23 NOTE — PROGRESS NOTES
Occupational Therapy    OT Treatment    Patient Name: Manuel Bowser  MRN: [id number]  Department: [facility] 20  Room: 2062067-A  Today's Date: 9/23/2024  Time Calculation  Start Time: 0922  Stop Time: 0946  Time Calculation (min): 24 min        Assessment:  OT Assessment: Pt motivated to engage in OT treatment. Pt demo good tolerance to engage in ADL tasks and functional mobility w Min-Max A . Pt limited by increased fatigue, decreased endurance, strength, activity tolerance, and balance. Pt would benefit from continued OT to address these deficits.  Prognosis: Good  Barriers to Discharge: Decreased caregiver support  Evaluation/Treatment Tolerance: Patient limited by fatigue  Medical Staff Made Aware: Yes  End of Session Communication: Bedside nurse  End of Session Patient Position: Bed, 3 rail up, Alarm on  OT Assessment Results: Decreased ADL status, Decreased upper extremity strength, Decreased safe judgment during ADL, Decreased endurance, Decreased functional mobility  Prognosis: Good  Barriers to Discharge: Decreased caregiver support  Evaluation/Treatment Tolerance: Patient limited by fatigue  Medical Staff Made Aware: Yes  Strengths: Attitude of self  Barriers to Participation: Comorbidities, Capable of completing ADLs semi/independent  Plan:  Treatment Interventions: ADL retraining, Functional transfer training, UE strengthening/ROM, Endurance training, Patient/family training, Equipment evaluation/education, Compensatory technique education, Cognitive reorientation  OT Frequency: 3 times per week  OT Discharge Recommendations: Moderate intensity level of continued care  Equipment Recommended upon Discharge:  (TBD AT NEXT LEVEL OF CARE)  OT Recommended Transfer Status: Moderate assist  OT - OK to Discharge: Yes  Treatment Interventions: ADL retraining, Functional transfer training, UE strengthening/ROM, Endurance training, Patient/family training, Equipment evaluation/education, Compensatory technique  education, Cognitive reorientation    Subjective   Previous Visit Info:  OT Last Visit  OT Received On: 09/23/24  General:  General  Reason for Referral: Presenting as a transfer from [facility] ICU for workup of acute liver injury vs failure and potential transplant evaluation.  Past Medical History Relevant to Rehab: HFpEF, DM, chronic pancreatitis, HTN, DLD, and alcohol use disorder  Family/Caregiver Present: No  Prior to Session Communication: Bedside nurse  Patient Position Received: Bed, 3 rail up, Alarm on  Preferred Learning Style: verbal, visual  General Comment: Pt awake and alert, supine in bed upon OT arrival. Pt was pleasant and agreeable to participate in OT session.  Precautions:  Medical Precautions: Fall precautions    Vital Signs (Past 2hrs)        Date/Time Vitals Session Patient Position Pulse Resp SpO2 BP MAP (mmHg)    09/23/24 0922 During OT  Sitting  --  --  --  99/61  --     09/23/24 1006 --  --  74  --  --  111/78  89                   Vital Signs Comment: BP 91/66 standing at EOB     Pain:  Pain Assessment  Pain Assessment: 0-10  0-10 (Numeric) Pain Score: 8  Pain Location: Abdomen  Pain Interventions: Repositioned  Response to Interventions: best at rest    Objective    Cognition:  Cognition  Overall Cognitive Status: Impaired  Arousal/Alertness: Delayed responses to stimuli  Coordination:  Movements are Fluid and Coordinated: Yes  Activities of Daily Living: UE Dressing  UE Dressing Level of Assistance: Minimum assistance  UE Dressing Where Assessed: Edge of bed  UE Dressing Comments: Min A gown mgmt seated at EOB    LE Dressing  LE Dressing: Yes  Pants Level of Assistance: Maximum assistance  Sock Level of Assistance: Maximum assistance  LE Dressing Where Assessed: Edge of bed  LE Dressing Comments: Max A to thread pants over BLEs and pull pants up over backside while standing at EOB  Functional Standing Tolerance:     Bed Mobility/Transfers: Bed Mobility  Bed Mobility: Yes  Bed Mobility  1  Bed Mobility 1: Supine to sitting, Sitting to supine  Level of Assistance 1: Moderate assistance, Moderate verbal cues  Bed Mobility Comments 1: Mod A, mod verbal cues supine to EOB to bring BLEs over EOB and sit upright. Mod A, Mod verbal cues to scoot to EOB.  Bed Mobility 2  Bed Mobility  2: Scooting  Level of Assistance 2: Moderate verbal cues, Contact guard  Bed Mobility Comments 2: CGA, Mod verbal cues to scoot to HOB  Bed Mobility 3  Bed Mobility 3: Sitting to supine  Level of Assistance 3: Moderate assistance, Moderate verbal cues  Bed Mobility Comments 3: Mod Ax2, Mod verbal cues to swing BLEs over EOB and trunk assist from sitting to supine. Mod Ax2 to boost in bed.    Transfers  Transfer: Yes  Transfer 1  Transfer From 1: Sit to, Stand to  Transfer to 1: Stand, Sit  Technique 1: Sit to stand, Stand to sit  Transfer Device 1: Walker  Transfer Level of Assistance 1: Moderate assistance, Moderate verbal cues    Sitting Balance:  Static Sitting Balance  Static Sitting-Balance Support: No upper extremity supported  Static Sitting-Level of Assistance: Close supervision  Standing Balance:  Dynamic Standing Balance  Dynamic Standing-Balance Support: Bilateral upper extremity supported  Dynamic Standing-Level of Assistance: Moderate assistance     Therapy/Activity: Therapeutic Activity  Therapeutic Activity Performed: Yes  Therapeutic Activity 1: pt completed sit<>stand transfer w FWW w Mod A x2 to stand, Mod verbal cues for BUE placement and technqiue. Pt stood~1-2mins before returning to sitting at EOB w Mod A. Further mobility deferred this date due to pt c/o lightheadness and drop in BP (99/61 sitting at EOB, 91/66 in standing). Pt required CGA, Mod verbal cues to scoot to HOB. Pt required Mod A, Mod verbal cues to swing BLEs over EOB and assist w trunk. Pt required Mod A x2 to boost higher in bed.    RUE   RUE : Within Functional Limits and LUE   LUE: Within Functional Limits      Outcome Measures:[facility]  Daily Activity  Putting on and taking off regular lower body clothing: A lot  Bathing (including washing, rinsing, drying): A lot  Putting on and taking off regular upper body clothing: A lot  Toileting, which includes using toilet, bedpan or urinal: A lot  Taking care of personal grooming such as brushing teeth: A little  Eating Meals: A little  Daily Activity - Total Score: 14        Education Documentation  Precautions, taught by Polina Stevenson OT at 9/23/2024 10:39 AM.  Learner: Patient  Readiness: Acceptance  Method: Explanation  Response: Verbalizes Understanding    ADL Training, taught by Polina Stevenson OT at 9/23/2024 10:39 AM.  Learner: Patient  Readiness: Acceptance  Method: Explanation  Response: Verbalizes Understanding    Education Comments  No comments found.        Goals:  Encounter Problems       Encounter Problems (Active)       ADLs       Patient with complete upper body dressing with modified independent level of assistance donning and doffing all UE clothes with PRN adaptive equipment  (Progressing)       Start:  09/18/24    Expected End:  10/02/24            Patient with complete lower body dressing with modified independent level of assistance donning and doffing all LE clothes  with PRN adaptive equipment  (Progressing)       Start:  09/18/24    Expected End:  10/02/24            Patient will complete daily grooming tasks brushing teeth and washing face/hair with modified independent level of assistance and PRN adaptive equipment  (Progressing)       Start:  09/18/24    Expected End:  10/02/24            Patient will complete toileting including hygiene clothing management/hygiene with modified independent level of assistance . (Progressing)       Start:  09/18/24    Expected End:  10/02/24               TRANSFERS       Patient will perform bed mobility modified independent level of assistance and bed rails and draw sheet in order to improve safety and independence with mobility (Progressing)        Start:  09/18/24    Expected End:  10/02/24            Patient will complete functional transfer with least restrictive device with modified independent level of assistance. (Progressing)       Start:  09/18/24    Expected End:  10/02/24

## 2024-09-23 NOTE — PROGRESS NOTES
Pt medically ready. Updated therapy notes sent to [facility] for auth which is still pending. Will escalate auth to direct pre-cert submit team again.   [facility] spoke to pt's sister, Yarelis and updated that auth still pending and will make her aware once pt gets auth.

## 2024-09-23 NOTE — PROGRESS NOTES
Physical Therapy    Physical Therapy Treatment    Patient Name: Manuel Bowser  MRN: [id number]  Department: [place] [address]  Room: 2067/2067-A  Today's Date: 9/23/2024  Time Calculation  Start Time: 1447  Stop Time: 1522  Time Calculation (min): 35 min         Assessment/Plan   PT Assessment  Barriers to Discharge: none  End of Session Communication: Bedside nurse  Assessment Comment: Pt. requires encouragement to put forth full effort - pt. appears distracted and not understanding the importance of trying to move /improve despite education thru out session. Pt. will  need continued therapy at a mod intensity level post acute as pt. unable to return home at this status level.  End of Session Patient Position: Bed, 3 rail up, Alarm on  PT Plan  Inpatient/Swing Bed or Outpatient: Inpatient  PT Plan  Treatment/Interventions: Bed mobility, Transfer training, Gait training, Balance training, Neuromuscular re-education, Strengthening, Endurance training, Therapeutic exercise, Therapeutic activity, Home exercise program, Positioning, Postural re-education  PT Plan: Ongoing PT  PT Frequency: 3 times per week  PT Discharge Recommendations: Moderate intensity level of continued care  PT Recommended Transfer Status: Total assist  PT - OK to Discharge: Yes (PT eval complete and DC rec made)      General Visit Information:   PT  Visit  PT Received On: 09/23/24  General  Reason for Referral: Presenting as a transfer from [facility] ICU for workup of acute liver injury vs failure and potential transplant evaluation.  Past Medical History Relevant to Rehab: HFpEF, DM, chronic pancreatitis, HTN, DLD, and alcohol use disorder  Missed Visit: No  Family/Caregiver Present: No  Prior to Session Communication: Bedside nurse  Patient Position Received: Bed, 3 rail up, Alarm on  General Comment: Pt. supine in bed upon arrival. Pt. given an introduction and explanation of therapy. Pt. appeared to be willing but not saying so.    Subjective    Precautions:       Vital Signs (Past 2hrs)        Date/Time Vitals Session Patient Position Pulse Resp SpO2 BP MAP (mmHg)    09/23/24 1413 --  --  --  --  --  106/73  84                         Objective   Pain:  Pain Assessment  Pain Assessment: 0-10  0-10 (Numeric) Pain Score: 0 - No pain  Cognition:  Cognition  Overall Cognitive Status: Impaired  Coordination:  Movements are Fluid and Coordinated: Yes  Postural Control:  Static Sitting Balance  Static Sitting-Balance Support: Feet supported, Bilateral upper extremity supported  Static Sitting-Level of Assistance: Close supervision  Static Standing Balance  Static Standing-Balance Support: Bilateral upper extremity supported  Static Standing-Level of Assistance: Moderate assistance  Extremity/Trunk Assessments:                      Activity Tolerance:  Activity Tolerance  Endurance: Decreased tolerance for upright activites  Treatments:  Therapeutic Exercise  Therapeutic Exercise Performed: Yes  Therapeutic Exercise Activity 1: Increased time due to pt. lack of focus- Supine bilat le ex AP'S, QS, SAQ'S, HS, AND ABD X 15 RPS.    Therapeutic Activity  Therapeutic Activity Performed: Yes  Therapeutic Activity 1: Pt. sat EOB for approx. 15 min with feet and bilat ue's supported Pt. required min assist until set in better position.    Bed Mobility  Bed Mobility: Yes  Bed Mobility 1  Bed Mobility 1: Supine to sitting  Level of Assistance 1: Minimum assistance  Bed Mobility Comments 1: Pt. required increased time  Bed Mobility 2  Bed Mobility  2: Sitting to supine  Level of Assistance 2: Minimum assistance    Transfers  Transfer: Yes  Transfer 1  Transfer From 1:  (Two unsuccessful trials with elevating bed and mod assist- Pt. appears to be lacking effort -)  Transfers 2  Transfer From 2:  (Requested a second staff member to assist pt. to stand. Pt. stood with min of one and cga of another.  Pt. required x1 assist to remain upright until pt. voluntarily sitting down-  encouragement to keep standing however pt. not willing at this time.)    Outcome Measures:  [facility] Basic Mobility  Turning from your back to your side while in a flat bed without using bedrails: A little  Moving from lying on your back to sitting on the side of a flat bed without using bedrails: A little  Moving to and from bed to chair (including a wheelchair): Total  Standing up from a chair using your arms (e.g. wheelchair or bedside chair): A lot  To walk in hospital room: Total  Climbing 3-5 steps with railing: Total  Basic Mobility - Total Score: 11    Education Documentation  Mobility Training, taught by Irma Lowe PTA at 9/23/2024  3:38 PM.  Learner: Patient  Readiness: Acceptance  Method: Explanation, Demonstration  Response: Needs Reinforcement    Education Comments  No comments found.        OP EDUCATION:       Encounter Problems       Encounter Problems (Active)       Mobility       Pt will perform bed mobility with cga assist.   (Progressing)       Start:  09/17/24    Expected End:  10/01/24            Pt will ambulate >50ft with LRAD and cga Assist with no LOB and VSS.   (Progressing)       Start:  09/17/24    Expected End:  10/01/24            Pt will demonstrate WFL BLE strength to complete therapeutic exercise and participate in functional mobility.   (Progressing)       Start:  09/17/24    Expected End:  10/01/24               PT Transfers       Pt will perform all functional transfers with LRAD and cga assist.   (Progressing)       Start:  09/17/24    Expected End:  10/01/24

## 2024-09-24 ENCOUNTER — APPOINTMENT (OUTPATIENT)
Dept: CARDIOLOGY | Facility: HOSPITAL | Age: 64
End: 2024-09-24
Payer: COMMERCIAL

## 2024-09-24 PROCEDURE — 1100000001 HC PRIVATE ROOM DAILY

## 2024-09-24 PROCEDURE — 92526 ORAL FUNCTION THERAPY: CPT | Mod: GN

## 2024-09-24 PROCEDURE — 92610 EVALUATE SWALLOWING FUNCTION: CPT | Mod: GN

## 2024-09-24 PROCEDURE — 2500000001 HC RX 250 WO HCPCS SELF ADMINISTERED DRUGS (ALT 637 FOR MEDICARE OP): Performed by: STUDENT IN AN ORGANIZED HEALTH CARE EDUCATION/TRAINING PROGRAM

## 2024-09-24 PROCEDURE — 93005 ELECTROCARDIOGRAM TRACING: CPT

## 2024-09-24 PROCEDURE — 99233 SBSQ HOSP IP/OBS HIGH 50: CPT

## 2024-09-24 PROCEDURE — 2500000004 HC RX 250 GENERAL PHARMACY W/ HCPCS (ALT 636 FOR OP/ED)

## 2024-09-24 PROCEDURE — 2500000001 HC RX 250 WO HCPCS SELF ADMINISTERED DRUGS (ALT 637 FOR MEDICARE OP)

## 2024-09-24 PROCEDURE — 2500000005 HC RX 250 GENERAL PHARMACY W/O HCPCS

## 2024-09-24 RX ORDER — LACTULOSE 10 G/15ML
20 SOLUTION ORAL EVERY 4 HOURS
Status: DISCONTINUED | OUTPATIENT
Start: 2024-09-24 | End: 2024-09-25

## 2024-09-24 RX ORDER — DEXTROSE, SODIUM CHLORIDE, SODIUM LACTATE, POTASSIUM CHLORIDE, AND CALCIUM CHLORIDE 5; .6; .31; .03; .02 G/100ML; G/100ML; G/100ML; G/100ML; G/100ML
75 INJECTION, SOLUTION INTRAVENOUS CONTINUOUS
Status: DISCONTINUED | OUTPATIENT
Start: 2024-09-24 | End: 2024-09-25

## 2024-09-24 RX ORDER — LACTULOSE 10 G/15ML
20 SOLUTION ORAL 4 TIMES DAILY
Status: DISCONTINUED | OUTPATIENT
Start: 2024-09-24 | End: 2024-09-24

## 2024-09-24 ASSESSMENT — COGNITIVE AND FUNCTIONAL STATUS - GENERAL
WALKING IN HOSPITAL ROOM: A LOT
DRESSING REGULAR LOWER BODY CLOTHING: A LOT
EATING MEALS: A LITTLE
TURNING FROM BACK TO SIDE WHILE IN FLAT BAD: A LITTLE
MOBILITY SCORE: 14
TOILETING: A LOT
STANDING UP FROM CHAIR USING ARMS: A LOT
MOVING TO AND FROM BED TO CHAIR: A LOT
HELP NEEDED FOR BATHING: A LOT
MOVING FROM LYING ON BACK TO SITTING ON SIDE OF FLAT BED WITH BEDRAILS: A LITTLE
DAILY ACTIVITIY SCORE: 14
CLIMB 3 TO 5 STEPS WITH RAILING: A LOT
DRESSING REGULAR UPPER BODY CLOTHING: A LOT
PERSONAL GROOMING: A LITTLE

## 2024-09-24 ASSESSMENT — PAIN SCALES - GENERAL
PAINLEVEL_OUTOF10: 8
PAINLEVEL_OUTOF10: 0 - NO PAIN
PAINLEVEL_OUTOF10: 0 - NO PAIN
PAINLEVEL_OUTOF10: 8
PAINLEVEL_OUTOF10: 9

## 2024-09-24 ASSESSMENT — PAIN - FUNCTIONAL ASSESSMENT
PAIN_FUNCTIONAL_ASSESSMENT: 0-10
PAIN_FUNCTIONAL_ASSESSMENT: 0-10

## 2024-09-24 ASSESSMENT — PAIN DESCRIPTION - LOCATION: LOCATION: ABDOMEN

## 2024-09-24 NOTE — CARE PLAN
The patient's goals for the shift include Pain control to 4-6 by the end of the shift    The clinical goals for the shift include pt  will remain free from pain this shift -tr    Over the shift, the patient did not make progress toward the following goals. Barriers to progression include   . Recommendations to address these barriers include   .

## 2024-09-24 NOTE — PROGRESS NOTES
Pt has auth to admit to [facility] SNF, auth good until 9/30. Medical team aware but now states pt no longer medically ready and will need a repeat swallow eval with ST. [facility] updated SNF and will update pt's sister, Yarelis pt no longer medically ready for discharge today. Will continue to follow.

## 2024-09-24 NOTE — PROGRESS NOTES
"Manuel Bowser is a 63 y.o. male on day 11 of admission presenting with Alcoholic liver failure (Multi).    Subjective   NAEO, no concerns from nursing.    Pt. is minimally interactive this morning. He does not endorse fever, chills, chest pain, or shortness of breath.    Review of systems: negative, except as above.      Objective   Last Recorded Vitals  Blood pressure 111/77, pulse 69, temperature 36.4 °C (97.5 °F), temperature source Temporal, resp. rate 17, height 1.753 m (5' 9\"), weight 75.7 kg (166 lb 14.2 oz), SpO2 99%.    Intake/Output last 3 Shifts:  I/O last 3 completed shifts:  In: 630 (8.3 mL/kg) [P.O.:630]  Out: 100 (1.3 mL/kg) [Urine:100 (0 mL/kg/hr)]  Weight: 75.7 kg     Physical Exam  Vitals reviewed.   Constitutional:       General: He is not in acute distress.     Appearance: Normal appearance. He is not toxic-appearing.   HENT:      Head: Normocephalic and atraumatic.      Mouth/Throat:      Mouth: Mucous membranes are moist.   Eyes:      Extraocular Movements: Extraocular movements intact.   Cardiovascular:      Rate and Rhythm: Normal rate and regular rhythm.      Pulses: Normal pulses.      Heart sounds: Normal heart sounds. No murmur heard.  Pulmonary:      Effort: Pulmonary effort is normal. No respiratory distress.      Breath sounds: Normal breath sounds. No stridor. No wheezing, rhonchi or rales.   Abdominal:      General: Abdomen is flat. Bowel sounds are normal.      Palpations: Abdomen is soft.      Tenderness: There is no abdominal tenderness. There is no guarding.   Musculoskeletal:         General: Normal range of motion.   Skin:     General: Skin is warm.      Capillary Refill: Capillary refill takes less than 2 seconds.      Coloration: Skin is not jaundiced.      Findings: No bruising.   Neurological:      General: No focal deficit present.      Mental Status: He is alert and oriented to person, place, and time.     Relevant Results  Scheduled medications  pantoprazole, 40 mg, " oral, Daily before breakfast   Or  esomeprazole, 40 mg, nasoduodenal tube, Daily before breakfast   Or  pantoprazole, 40 mg, intravenous, Daily before breakfast  lactulose, 20 g, oral, TID  levothyroxine, 125 mcg, oral, Daily  lidocaine, 1 patch, transdermal, Daily  melatonin, 6 mg, oral, Daily  metoprolol tartrate, 25 mg, oral, BID    Continuous medications     PRN medications  PRN medications: ketorolac, oxyCODONE **OR** oxyCODONE      Assessment/Plan   Alcoholic liver failure (Multi)    Manuel Bowser is a 63 y.o. male with PMH of DM, HTN, HFpEF, alcohol use disorder, pancreatitis who is admitted for evaluation of acute liver injury vs. failure. Complications of such, including hepatic encephalopathy and coagulopathy, have resolved. S/p transjugular liver bx, paracentesis, and hepatic pressure cath on 9/16, c/w portal hypertension. Liver biopsy results will be followed up OP with hepatology. Precert complete for SNF; however, SLP eval deemed him unsafe to swallow given current mental status, thus he is NPO. His worsening mentation likely due to slowing of bowel movements from 3-4/day to only 2/day. Lactulose increased to Q4H and then will be titrated to 3-4/day. Will have SLP re-evaluate when mentation has improved.    09/24/24 Updates  - NPO effective immediately  - Lactulose increased to Q4H and then will be titrated to 3-4/day    Acute problems  # Acute liver injury/failure, possible acute on chronic liver disease  # Hepatic Encephalopathy [Resolved]  # Coagulopathy [Resolved]  -Hx of chronic alcohol use disorder  -Patient presents with acute liver injury with INR >9.5 +/- failure given reported encephalopathy with A&Ox2 at OSH. Currently A&Ox4, no asterixis or c/f current encephalopathy though has been treated with lactulose  -Hepatic function panel severely deranged-Alb-1.7,ALP-804,ALT-278,AST-945  -Hepatitis panel, A1AT, Ceruloplasmin,HSV1&2, Acetaminophen level,AFP,Fibrinogen,MICHAEL are all within normal  limits  -Ferritin>9000,serum iron-85 WNL  -EBV DNA<35 detected  -IgG-1600  -AMA-29.3 (uln 24.9, >25 is positive), repeat AMA-9/14 is negative  -liver US shows echogenic appearance of the liver compatible with steatosis and normal Doppler interrogation of the hepatic vasculature. There is mild abdominal ascites and a right pleural effusion   -INR initially as high as 9 on admission, now stable at 1.3  Plan:  -Hepatology signing off, pt to follow up outpatient on 10/9/24  -will follow up on pending hepatic work up  -continue with lactulose 20g TID, titrate prn to 3-4 BM/day   -Monitor Hgb, Coags, signs of bleed  -Daily MELD labs    #Acute anaemia/concern for hemolysis  -Hgb 12.0 to 9.5   - pale but not jaundiced  -Tbili-2.7, indirect-1.4, LDH-846, Haptoglobin<30  -peripheral blood smear-Mild microcytosis with many target cells and many Pappenheimer bodies. Findings consistent with hemoglobinopathy.   -Hb remains stable   Plan:  -monitor Hb     # Traumatic catheterization  -Difficult catheterization at MICU with bleeding.  -patient accidentally urinated without difficulty  Plan:  -Urology consulted; hematuria felt to be due more to supratherapeutic INR than urethral trauma  -Pt voiding on his own; will defer butts or bladder scans unless develops retention    Chronic problems  #HFpEF  #Tachycardia  -limited ECHO -9/13 shows EF of 78%  -CXR without signs of pulmonary edema  -Tachycardia resolved  -on home metop 25mg bid    Prevention of hospital-associated complications:  DVT Ppx: SCDs  Diet: Regular  GI PPx: PPI  Bowel Regimen: Scheduled lactulose  Butts: No  IVF: None  Access/Lines: PIVs  Telemetry: No  Physical Therapy Consult Needed: Yes  Antimicrobials: None    DISPOSITION: SNF pending precert    CODE STATUS: Full Code    NOK: Mayela Bowser (Spouse) [phone]       Shiva Guzmán MD  PGY-1 Resident Physician  Department of Anesthesiology & Perioperative Medicine

## 2024-09-24 NOTE — PROGRESS NOTES
Speech-Language Pathology    SLP Adult Inpatient Speech-Language Pathology Clinical Swallow Evaluation    Patient Name: Manuel Bowser  MRN: [id number]  Today's Date: 9/24/2024   Time Calculation  Start Time: 1007  Stop Time: 1030  Time Calculation (min): 23 min         Assessment:  Suspect severe pharyngeal dysphagia  High risk for aspiration w/ PO intake is apparent  Swallowing function impacted by mental status       Recommendations:  NPO  Consider a short term alternative means of nutrition/hydration/medication support.    Frequent, aggressive oral care as tolerated to improve infection control       Pt may require instrumental assessment of swallowing prior to initiation of PO diet.  Not yet appropriate given current severity of swallowing deficits and mental status.  Will continue to monitor to determine readiness.          Plan:  SLP Plan: Skilled SLP  SLP Frequency: 2x per week  Duration:  4 weeks  Discussed POC: Pt, medical team      Goals:  Pt will demonstrate adequate oral phase and initiate effortful swallows with small ice chips x10 reps.   Date initiated: 9/24/24   Expected Time Frame to Meet Goal: 2-3 weeks    Status: Goal initiated     Pt will participate in ongoing dysphagia assessment including the 3 oz Swallow Protocol with no overt indicators of aspiration on 100% of attempts.    Date initiated: 9/24/24   Expected Time Frame to Meet Goal: 2-3 weeks    Status: Goal initiated     Pt/family will indicate understanding of dysphagia education: risk for aspiration, recommendations, and POC with > 80% accuracy via teach back method.   Date initiated: 9/24/24   Expected Time Frame to Meet Goal: 2-3 weeks    Status: Goal initiated            Subjective   RN cleared pt for evaluation.  Pt properly identified and evaluated bedside. Awake and alert, but difficulty following commands and answering questions.  Room air.  No family present.  Intermittent congested cough unrelated to PO intake.      Pt admitted  w/ acute liver injury vs failure; hepatic encephalopathy.  PMHx: ETOH use disorder.     Pt unable to describe baseline swallowing function.         Objective     Cognition:  Command Following: ~ 50% accuracy w/ simple 1-step commands   Attention: Fair   Orientation: Oriented to person and city only; ST provided reorientation otherwise       Oral/Motor Assessment:  Oral Hygiene: WFL  Dentition: Missing some dentition   Oral Motor: Unable to follow commands for thorough assessment; no overt deficits noted   Voice (Perceptually): WFL  Volitional Cough (Perceptually): Unable to follow commands to initiate   Volitional Swallow: Unable to follow commands to initiate            Consistencies Trialed:  Ice chips, sips of water.  Not appropriate for additional trials given apparent severity of swallowing deficits.         Clinical Observations:  Patient Positioning: Upright in Bed  Management of Oral Secretions: WFL  Was The 3 oz Swallow Protocol Completed: No (Deferred at this time 2' to pt performance  w/ single ice chips and sips)        Clinical bedside swallow evaluation completed.  Pt presented with small, single ice chips x4.  Functional bolus containment and manipulation.  Required MAX cues to initiate swallow; swallow response appeared to initiate in 2/4 trials. S/s of aspiration (weak cough) following.  Thin liquids presented via tsp x2.  Prompt swallow initiation, but immediate, significant s/s of aspiration in 2/2 trials.  PO trials ceased due to concern for pharyngeal dysphagia/aspiration.      Therapy:  Education provided to patient regarding NPO, importance of oral care, and overall dysphagia plan of care.  Pt able to indicate understanding via teach back with ~ 25% accuracy.  Discussed pt status w/ RN and medical team.            09/24/24 at 5:46 PM - Idalmis Woodard, SLP

## 2024-09-24 NOTE — CARE PLAN
The patient's goals for the shift include Pain control to 4-6 by the end of the shift    The clinical goals for the shift include pt will have pain controlled by the end of this shift.

## 2024-09-25 ENCOUNTER — APPOINTMENT (OUTPATIENT)
Dept: RADIOLOGY | Facility: HOSPITAL | Age: 64
End: 2024-09-25
Payer: COMMERCIAL

## 2024-09-25 ENCOUNTER — APPOINTMENT (OUTPATIENT)
Dept: CARDIOLOGY | Facility: HOSPITAL | Age: 64
End: 2024-09-25
Payer: COMMERCIAL

## 2024-09-25 LAB
ALBUMIN SERPL BCP-MCNC: 1.7 G/DL (ref 3.4–5)
ALP SERPL-CCNC: 425 U/L (ref 33–136)
ALT SERPL W P-5'-P-CCNC: 48 U/L (ref 10–52)
ANION GAP SERPL CALC-SCNC: 12 MMOL/L (ref 10–20)
APTT PPP: 31 SECONDS (ref 27–38)
AST SERPL W P-5'-P-CCNC: 51 U/L (ref 9–39)
BASOPHILS # BLD AUTO: 0 X10*3/UL (ref 0–0.1)
BASOPHILS NFR BLD AUTO: 0 %
BILIRUB DIRECT SERPL-MCNC: 2 MG/DL (ref 0–0.3)
BILIRUB SERPL-MCNC: 3.8 MG/DL (ref 0–1.2)
BUN SERPL-MCNC: 9 MG/DL (ref 6–23)
CALCIUM SERPL-MCNC: 7.4 MG/DL (ref 8.6–10.6)
CARDIAC TROPONIN I PNL SERPL HS: 37 NG/L (ref 0–53)
CHLORIDE SERPL-SCNC: 103 MMOL/L (ref 98–107)
CO2 SERPL-SCNC: 25 MMOL/L (ref 21–32)
CREAT SERPL-MCNC: 0.86 MG/DL (ref 0.5–1.3)
EGFRCR SERPLBLD CKD-EPI 2021: >90 ML/MIN/1.73M*2
EOSINOPHIL # BLD AUTO: 0.01 X10*3/UL (ref 0–0.7)
EOSINOPHIL NFR BLD AUTO: 0.1 %
ERYTHROCYTE [DISTWIDTH] IN BLOOD BY AUTOMATED COUNT: 20 % (ref 11.5–14.5)
GLUCOSE SERPL-MCNC: 190 MG/DL (ref 74–99)
HCT VFR BLD AUTO: 28.6 % (ref 41–52)
HGB BLD-MCNC: 9.3 G/DL (ref 13.5–17.5)
IMM GRANULOCYTES # BLD AUTO: 0.05 X10*3/UL (ref 0–0.7)
IMM GRANULOCYTES NFR BLD AUTO: 0.6 % (ref 0–0.9)
INR PPP: 1.4 (ref 0.9–1.1)
LYMPHOCYTES # BLD AUTO: 1.42 X10*3/UL (ref 1.2–4.8)
LYMPHOCYTES NFR BLD AUTO: 17.4 %
MAGNESIUM SERPL-MCNC: 1.74 MG/DL (ref 1.6–2.4)
MCH RBC QN AUTO: 29.1 PG (ref 26–34)
MCHC RBC AUTO-ENTMCNC: 32.5 G/DL (ref 32–36)
MCV RBC AUTO: 89 FL (ref 80–100)
MONOCYTES # BLD AUTO: 0.69 X10*3/UL (ref 0.1–1)
MONOCYTES NFR BLD AUTO: 8.5 %
NEUTROPHILS # BLD AUTO: 5.97 X10*3/UL (ref 1.2–7.7)
NEUTROPHILS NFR BLD AUTO: 73.4 %
NRBC BLD-RTO: 3.2 /100 WBCS (ref 0–0)
PHOSPHATE SERPL-MCNC: 2.6 MG/DL (ref 2.5–4.9)
PLATELET # BLD AUTO: 42 X10*3/UL (ref 150–450)
POTASSIUM SERPL-SCNC: 3.3 MMOL/L (ref 3.5–5.3)
PROT SERPL-MCNC: 5.3 G/DL (ref 6.4–8.2)
PROTHROMBIN TIME: 15.7 SECONDS (ref 9.8–12.8)
RBC # BLD AUTO: 3.2 X10*6/UL (ref 4.5–5.9)
SODIUM SERPL-SCNC: 137 MMOL/L (ref 136–145)
WBC # BLD AUTO: 8.1 X10*3/UL (ref 4.4–11.3)

## 2024-09-25 PROCEDURE — 83735 ASSAY OF MAGNESIUM: CPT

## 2024-09-25 PROCEDURE — 2500000004 HC RX 250 GENERAL PHARMACY W/ HCPCS (ALT 636 FOR OP/ED)

## 2024-09-25 PROCEDURE — 85610 PROTHROMBIN TIME: CPT | Performed by: STUDENT IN AN ORGANIZED HEALTH CARE EDUCATION/TRAINING PROGRAM

## 2024-09-25 PROCEDURE — 92526 ORAL FUNCTION THERAPY: CPT | Mod: GN

## 2024-09-25 PROCEDURE — 36415 COLL VENOUS BLD VENIPUNCTURE: CPT

## 2024-09-25 PROCEDURE — 2500000001 HC RX 250 WO HCPCS SELF ADMINISTERED DRUGS (ALT 637 FOR MEDICARE OP)

## 2024-09-25 PROCEDURE — 85025 COMPLETE CBC W/AUTO DIFF WBC: CPT

## 2024-09-25 PROCEDURE — 97530 THERAPEUTIC ACTIVITIES: CPT | Mod: GO

## 2024-09-25 PROCEDURE — 84100 ASSAY OF PHOSPHORUS: CPT | Performed by: STUDENT IN AN ORGANIZED HEALTH CARE EDUCATION/TRAINING PROGRAM

## 2024-09-25 PROCEDURE — 84484 ASSAY OF TROPONIN QUANT: CPT

## 2024-09-25 PROCEDURE — 36415 COLL VENOUS BLD VENIPUNCTURE: CPT | Performed by: STUDENT IN AN ORGANIZED HEALTH CARE EDUCATION/TRAINING PROGRAM

## 2024-09-25 PROCEDURE — 82248 BILIRUBIN DIRECT: CPT | Performed by: STUDENT IN AN ORGANIZED HEALTH CARE EDUCATION/TRAINING PROGRAM

## 2024-09-25 PROCEDURE — 71045 X-RAY EXAM CHEST 1 VIEW: CPT

## 2024-09-25 PROCEDURE — 2500000001 HC RX 250 WO HCPCS SELF ADMINISTERED DRUGS (ALT 637 FOR MEDICARE OP): Performed by: STUDENT IN AN ORGANIZED HEALTH CARE EDUCATION/TRAINING PROGRAM

## 2024-09-25 PROCEDURE — 1100000001 HC PRIVATE ROOM DAILY

## 2024-09-25 PROCEDURE — 2500000005 HC RX 250 GENERAL PHARMACY W/O HCPCS

## 2024-09-25 PROCEDURE — 93005 ELECTROCARDIOGRAM TRACING: CPT

## 2024-09-25 PROCEDURE — 71045 X-RAY EXAM CHEST 1 VIEW: CPT | Performed by: RADIOLOGY

## 2024-09-25 PROCEDURE — 97535 SELF CARE MNGMENT TRAINING: CPT | Mod: GO

## 2024-09-25 PROCEDURE — 99232 SBSQ HOSP IP/OBS MODERATE 35: CPT

## 2024-09-25 RX ORDER — POTASSIUM CHLORIDE 14.9 MG/ML
20 INJECTION INTRAVENOUS
Status: COMPLETED | OUTPATIENT
Start: 2024-09-25 | End: 2024-09-25

## 2024-09-25 RX ORDER — METOPROLOL TARTRATE 1 MG/ML
10 INJECTION, SOLUTION INTRAVENOUS ONCE
Status: DISCONTINUED | OUTPATIENT
Start: 2024-09-25 | End: 2024-09-25

## 2024-09-25 RX ORDER — KETOROLAC TROMETHAMINE 15 MG/ML
10 INJECTION, SOLUTION INTRAMUSCULAR; INTRAVENOUS EVERY 8 HOURS PRN
Status: DISPENSED | OUTPATIENT
Start: 2024-09-25 | End: 2024-09-28

## 2024-09-25 RX ORDER — HYDROMORPHONE HYDROCHLORIDE 1 MG/ML
0.2 INJECTION, SOLUTION INTRAMUSCULAR; INTRAVENOUS; SUBCUTANEOUS EVERY 4 HOURS PRN
Status: DISCONTINUED | OUTPATIENT
Start: 2024-09-25 | End: 2024-09-27

## 2024-09-25 RX ORDER — LACTULOSE 10 G/15ML
20 SOLUTION ORAL EVERY 8 HOURS
Status: DISCONTINUED | OUTPATIENT
Start: 2024-09-25 | End: 2024-09-28

## 2024-09-25 RX ORDER — LIDOCAINE 560 MG/1
1 PATCH PERCUTANEOUS; TOPICAL; TRANSDERMAL DAILY
Status: DISCONTINUED | OUTPATIENT
Start: 2024-09-26 | End: 2024-10-01 | Stop reason: HOSPADM

## 2024-09-25 RX ORDER — MAGNESIUM SULFATE HEPTAHYDRATE 40 MG/ML
2 INJECTION, SOLUTION INTRAVENOUS ONCE
Status: COMPLETED | OUTPATIENT
Start: 2024-09-25 | End: 2024-09-25

## 2024-09-25 ASSESSMENT — ACTIVITIES OF DAILY LIVING (ADL): HOME_MANAGEMENT_TIME_ENTRY: 8

## 2024-09-25 ASSESSMENT — COGNITIVE AND FUNCTIONAL STATUS - GENERAL
WALKING IN HOSPITAL ROOM: A LOT
MOVING TO AND FROM BED TO CHAIR: A LOT
WALKING IN HOSPITAL ROOM: A LOT
DRESSING REGULAR LOWER BODY CLOTHING: A LOT
EATING MEALS: A LITTLE
EATING MEALS: A LITTLE
TOILETING: A LOT
DAILY ACTIVITIY SCORE: 14
DRESSING REGULAR UPPER BODY CLOTHING: A LOT
TOILETING: A LOT
CLIMB 3 TO 5 STEPS WITH RAILING: A LOT
DAILY ACTIVITIY SCORE: 14
PERSONAL GROOMING: A LITTLE
STANDING UP FROM CHAIR USING ARMS: A LOT
STANDING UP FROM CHAIR USING ARMS: A LOT
TURNING FROM BACK TO SIDE WHILE IN FLAT BAD: A LITTLE
CLIMB 3 TO 5 STEPS WITH RAILING: A LOT
DRESSING REGULAR LOWER BODY CLOTHING: A LOT
MOBILITY SCORE: 14
HELP NEEDED FOR BATHING: A LOT
TOILETING: A LOT
DAILY ACTIVITIY SCORE: 14
MOVING TO AND FROM BED TO CHAIR: A LOT
PERSONAL GROOMING: A LITTLE
DRESSING REGULAR UPPER BODY CLOTHING: A LOT
MOBILITY SCORE: 14
DRESSING REGULAR LOWER BODY CLOTHING: A LOT
MOVING FROM LYING ON BACK TO SITTING ON SIDE OF FLAT BED WITH BEDRAILS: A LITTLE
DRESSING REGULAR UPPER BODY CLOTHING: A LOT
TURNING FROM BACK TO SIDE WHILE IN FLAT BAD: A LITTLE
MOVING FROM LYING ON BACK TO SITTING ON SIDE OF FLAT BED WITH BEDRAILS: A LITTLE
HELP NEEDED FOR BATHING: A LOT
EATING MEALS: A LITTLE
HELP NEEDED FOR BATHING: A LOT
PERSONAL GROOMING: A LITTLE

## 2024-09-25 ASSESSMENT — PAIN SCALES - GENERAL
PAINLEVEL_OUTOF10: 4
PAINLEVEL_OUTOF10: 0 - NO PAIN
PAINLEVEL_OUTOF10: 8
PAINLEVEL_OUTOF10: 4
PAINLEVEL_OUTOF10: 8
PAINLEVEL_OUTOF10: 8

## 2024-09-25 ASSESSMENT — PAIN - FUNCTIONAL ASSESSMENT
PAIN_FUNCTIONAL_ASSESSMENT: 0-10

## 2024-09-25 NOTE — SIGNIFICANT EVENT
Rapid Response Nurse Note: [x] Rapid Response [] RADAR alert: Score   Pager time:   Arrival time:   Event end time:   Location: [facility]  [] Phone triage     Rapid response initiated by:  [] Rapid Response RN [] Family [] Nursing Supervisor [] Physician   [] RADAR auto-page [] Sepsis auto-page [x] RN [] RT   [] NP/PA [] Other:     Primary reason for call:   [] BAT [] New CPAP/BiPAP [] Bleeding [] Change in mental status   [x] Chest pain [] Code blue [] FiO2 >/= 50% [] HR </= 40 bpm   [] HR >/= 130 bpm [] Hyperglycemia [] Hypoglycemia [] RADAR    [] RR </= 8 bpm [] RR >/= 30 bpm [] SBP </= 90 mmHg [] SpO2 < 90%   [] Seizure [] Sepsis [] Staff concern:     Initial VS and/or RADAR VS: HR 84; RR 15; BP ; SPO2 100% RA.  Providers present at bedside: KEVIN Taylor MD; CAITLYN Alcantar MD ([facility])  Objective/Subjective data relevant to event: chest pain ongoing since  p.m. per patient, does not radiate, reproducible to palpation; pain left chest; PMH MI; no diaphoresis, distress, nausea or vomiting; VS stable; EKG obtained  given to MD; pain /10; trop ordered  Interventions:  [] None [] ABG [] Assist w/ICU transfer [] BAT paged    [] Bag mask [] Blood [] Cardioversion [] Code Blue   [] Code blue for intubation [] Code status changed [x] Chest x-ray [x] EKG   [] IV fluid/bolus [] KUB x-ray [x] Labs/cultures [] Medication   [] Nebulizer treatment [] NIPPV (CPAP/BiPAP) [] Oxygen [] Oral airway   [] Peripheral IV [] Palliative care consult [] CT/MRI [] Sepsis protocol    [] Suctioned [] Other:     Name of ICU Provider contacted (if applicable):   Outcome:  [] Coded and  [] Code blue for intubation [] Coded and transferred to ICU []  on division   [x] Remained on division (no change) [] Remained on division + additional monitoring [] Remained in ED [] Transferred to ED   [] Transferred to ICU [] Transferred to inpatient status [] Transferred for interventions (procedure) [] Transferred to ICU stepdown     [] Transferred to surgery [] Transferred to telemetry [] Sepsis protocol [] STEMI protocol   [] Stroke protocol [x] Bedside nurse instructed to page rapid response for any concerns or acute change in condition/VS     Additional Comments: pending CXR and troponin result

## 2024-09-25 NOTE — CARE PLAN
The patient's goals for the shift include Pain control to 4-6 by the end of the shift    The clinical goals for the shift include Patient will remain free from falls, by the end of this shift.    Patient remained free from injuries during this shift.     Patient is in bed, HOB elevated, call light within reach.    Patient maintained NPO status.    Plan of care ongoing.

## 2024-09-25 NOTE — PROGRESS NOTES
"Manuel Bowser is a 63 y.o. male on day 12 of admission presenting with Alcoholic liver failure (Multi).    Subjective   NAEO, no concerns from nursing.    Pt. continues to be minimally interactive this morning. He does not complain of lightheadedness, dizziness, headache, chest pain, shortness of breath, or abdominal pain.    Review of systems: negative, except as above.      Objective   Last Recorded Vitals  Blood pressure 118/81, pulse 75, temperature 36 °C (96.8 °F), resp. rate 16, height 1.753 m (5' 9\"), weight 75.9 kg (167 lb 5.3 oz), SpO2 97%.    Intake/Output last 3 Shifts:  I/O last 3 completed shifts:  In: 440 (5.8 mL/kg) [P.O.:330; I.V.:110 (1.4 mL/kg)]  Out: - (0 mL/kg)   Weight: 75.9 kg     Physical Exam  Vitals reviewed.   Constitutional:       General: He is not in acute distress.     Appearance: Normal appearance. He is not toxic-appearing.   HENT:      Head: Normocephalic and atraumatic.      Mouth/Throat:      Mouth: Mucous membranes are moist.   Eyes:      Extraocular Movements: Extraocular movements intact.   Cardiovascular:      Rate and Rhythm: Normal rate and regular rhythm.      Pulses: Normal pulses.      Heart sounds: Normal heart sounds. No murmur heard.  Pulmonary:      Effort: Pulmonary effort is normal. No respiratory distress.      Breath sounds: Normal breath sounds. No stridor. No wheezing, rhonchi or rales.   Abdominal:      General: Abdomen is flat. Bowel sounds are normal.      Palpations: Abdomen is soft.      Tenderness: There is no abdominal tenderness. There is no guarding.   Musculoskeletal:         General: Normal range of motion.   Skin:     General: Skin is warm.      Capillary Refill: Capillary refill takes less than 2 seconds.      Coloration: Skin is not jaundiced.      Findings: No bruising.   Neurological:      General: No focal deficit present.      Mental Status: He is alert and oriented to person, place, and time.     Relevant Results  Scheduled " medications  pantoprazole, 40 mg, oral, Daily before breakfast   Or  esomeprazole, 40 mg, nasoduodenal tube, Daily before breakfast   Or  pantoprazole, 40 mg, intravenous, Daily before breakfast  lactulose, 20 g, oral, q4h  levothyroxine, 125 mcg, oral, Daily  lidocaine, 1 patch, transdermal, Daily  melatonin, 6 mg, oral, Daily  metoprolol tartrate, 25 mg, oral, BID    Continuous medications     PRN medications  PRN medications: ketorolac, oxyCODONE **OR** oxyCODONE      Assessment/Plan   Alcoholic liver failure (Multi)    aMnuel Bowser is a 63 y.o. male with PMH of DM, HTN, HFpEF, alcohol use disorder, pancreatitis who is admitted for evaluation of acute liver injury vs. failure. Complications of such, including hepatic encephalopathy and coagulopathy, have resolved. S/p transjugular liver bx, paracentesis, and hepatic pressure cath on 9/16, c/w portal hypertension. Liver biopsy results will be followed up OP with hepatology. Precert complete for SNF, which is good until 9/30. SLP eval obtained due to observed dysphagia by nursing and deemed him unsafe to swallow given current mental status, thus he is NPO except meds. His worsening mentation likely due to slowing of bowel movements from 3-4/day to only 2/day. Lactulose increased to Q4H overnight and he had 3 large bowel movements. Lactulose now to be titrated to 3-4/day. Will have SLP re-evaluate today since he is closer to baseline.    09/25/24 Updates  - NPO except meds  - Lactulose titrated to 3-4/day  - Will have SLP re-evaluate today    Acute problems  # Acute liver injury/failure, possible acute on chronic liver disease  # Hepatic Encephalopathy [Resolved]  # Coagulopathy [Resolved]  -Hx of chronic alcohol use disorder  -Patient presents with acute liver injury with INR >9.5 +/- failure given reported encephalopathy with A&Ox2 at OSH. Currently A&Ox4, no asterixis or c/f current encephalopathy though has been treated with lactulose  -Hepatic function  panel severely deranged-Alb-1.7,ALP-804,ALT-278,AST-945  -Hepatitis panel, A1AT, Ceruloplasmin,HSV1&2, Acetaminophen level,AFP,Fibrinogen,MICHAEL are all within normal limits  -Ferritin>9000,serum iron-85 WNL  -EBV DNA<35 detected  -IgG-1600  -AMA-29.3 (uln 24.9, >25 is positive), repeat AMA-9/14 is negative  -liver US shows echogenic appearance of the liver compatible with steatosis and normal Doppler interrogation of the hepatic vasculature. There is mild abdominal ascites and a right pleural effusion   -INR initially as high as 9 on admission, now stable at 1.3  Plan:  -Hepatology signing off, pt to follow up outpatient on 10/9/24  -will follow up on pending hepatic work up  -continue with lactulose 20g TID, titrate prn to 3-4 BM/day   -Monitor Hgb, Coags, signs of bleed  -Daily MELD labs    #Acute anaemia/concern for hemolysis  -Hgb 12.0 to 9.5   - pale but not jaundiced  -Tbili-2.7, indirect-1.4, LDH-846, Haptoglobin<30  -peripheral blood smear-Mild microcytosis with many target cells and many Pappenheimer bodies. Findings consistent with hemoglobinopathy.   -Hb remains stable   Plan:  -monitor Hb     # Traumatic catheterization  -Difficult catheterization at MICU with bleeding.  -patient accidentally urinated without difficulty  Plan:  -Urology consulted; hematuria felt to be due more to supratherapeutic INR than urethral trauma  -Pt voiding on his own; will defer butts or bladder scans unless develops retention    Chronic problems  #HFpEF  #Tachycardia  -limited ECHO -9/13 shows EF of 78%  -CXR without signs of pulmonary edema  -Tachycardia resolved  -on home metop 25mg bid    Prevention of hospital-associated complications:  DVT Ppx: SCDs  Diet: Regular  GI PPx: PPI  Bowel Regimen: Scheduled lactulose  Butts: No  IVF: None  Access/Lines: PIVs  Telemetry: No  Physical Therapy Consult Needed: Yes  Antimicrobials: None    DISPOSITION: SNF, auth good until 9/30     CODE STATUS: Full Code    NOK: Mayela Bowser  (Nurvsf) [phone]       Shiva Guzmán MD  PGY-1 Resident Physician  Department of Anesthesiology & Perioperative Medicine

## 2024-09-25 NOTE — PROGRESS NOTES
Occupational Therapy    Occupational Therapy Treatment    Name: Manuel Bowser  MRN: [id number]  Department: [facility] 20  Room: [id number]  Date: 09/25/24  Time Calculation  Start Time: 0940  Stop Time: 1005  Time Calculation (min): 25 min    Assessment:  Medical Staff Made Aware: Yes  End of Session Communication: Bedside nurse, Physician  End of Session Patient Position: Bed, 3 rail up, Alarm on  Plan:  Treatment Interventions:  (ADL retraining; Functional transfer training; UE strengthening/ROM; Endurance training; Patient/family training; Cognitive reorientation; Equipment evaluation/education; Compensatory technique education)  OT Frequency: 3 times per week  OT Discharge Recommendations: Moderate intensity level of continued care  Equipment Recommended upon Discharge:  (TBD AT NEXT LEVEL OF CARE)  OT Recommended Transfer Status: Moderate assist  OT - OK to Discharge: Yes    Subjective   Previous Visit Info:  OT Last Visit  OT Received On: 09/25/24  General:  General  Reason for Referral: Presenting as a transfer from [facility] ICU for workup of acute liver injury vs failure and potential transplant evaluation.  Past Medical History Relevant to Rehab: HFpEF, DM, chronic pancreatitis, HTN, DLD, and alcohol use disorder  Family/Caregiver Present: No  Prior to Session Communication: Bedside nurse  Patient Position Received: Bed, 3 rail up, Alarm off, caregiver present  Preferred Learning Style: verbal, visual  General Comment: FLAT BUT PLEASANT AND COOPERATIVE FOR FULL  PARTICIPATION AS TOLERATED  Precautions:  Medical Precautions: Fall precautions    Vital Signs (Past 2hrs)                Pain Assessment:  Pain Assessment  Pain Assessment: 0-10  0-10 (Numeric) Pain Score: 4  Pain Type: Chronic pain  Pain Location: Abdomen  Pain Orientation: Mid  Pain Interventions: Medication (See MAR), Repositioned  Response to Interventions: PARTICIPATION IN SESSION     Objective   Cognition:  Overall Cognitive Status: Within  Functional Limits (WILL CONTINUE TO MONITOR)  Arousal/Alertness: Delayed responses to stimuli  Orientation Level: Oriented X4  Attention: Within Functional Limits  Activities of Daily Living:      LE Dressing  Sock Level of Assistance: Dependent  LE Dressing Where Assessed: Bed level    Toileting  Toileting Level of Assistance: Dependent  Where Assessed:  (STANDING AT EOB)    Functional Standing Tolerance:  Functional Standing Tolerance  Time: </= 2 MINUTES  Bed Mobility/Transfers: Bed Mobility  Bed Mobility: Yes  Bed Mobility 1  Bed Mobility 1: Supine to sitting  Level of Assistance 1: Maximum assistance, Minimal tactile cues, Minimal verbal cues  Bed Mobility 2  Bed Mobility  2: Sitting to supine  Level of Assistance 2: Moderate assistance, Moderate tactile cues, Moderate verbal cues    Transfers  Transfer: Yes  Transfer 1  Transfer From 1: Sit to, Stand to  Transfer to 1: Stand, Sit  Transfer Device 1: Walker  Transfer Level of Assistance 1: Moderate assistance    Functional Mobility:  Functional Mobility  Functional Mobility Performed: Yes  Functional Mobility 1  Surface 1: Level tile  Device 1: Rolling walker  Functional Mobility Support Devices:  (TOLERATED x 6 SIDE STEPS USING WH W)     Standing Balance:  Static Standing Balance  Static Standing-Balance Support: Bilateral upper extremity supported  Static Standing-Level of Assistance: Minimum assistance    Outcome Measures:  [facility] Daily Activity  Putting on and taking off regular lower body clothing: A lot  Bathing (including washing, rinsing, drying): A lot  Putting on and taking off regular upper body clothing: A lot  Toileting, which includes using toilet, bedpan or urinal: A lot  Taking care of personal grooming such as brushing teeth: A little  Eating Meals: A little  Daily Activity - Total Score: 14         and      Education Documentation  Precautions, taught by Inna Lee OT at 9/25/2024 11:47 AM.  Learner: Patient  Readiness: Eager  Method:  Explanation  Response: Verbalizes Understanding    ADL Training, taught by Inna Lee OT at 9/25/2024 11:47 AM.  Learner: Patient  Readiness: Eager  Method: Explanation  Response: Verbalizes Understanding    Education Comments  No comments found.      Goals:  Encounter Problems       Encounter Problems (Active)       ADLs       Patient with complete upper body dressing with modified independent level of assistance donning and doffing all UE clothes with PRN adaptive equipment  (Progressing)       Start:  09/18/24    Expected End:  10/02/24            Patient with complete lower body dressing with modified independent level of assistance donning and doffing all LE clothes  with PRN adaptive equipment  (Progressing)       Start:  09/18/24    Expected End:  10/02/24            Patient will complete daily grooming tasks brushing teeth and washing face/hair with modified independent level of assistance and PRN adaptive equipment  (Progressing)       Start:  09/18/24    Expected End:  10/02/24            Patient will complete toileting including hygiene clothing management/hygiene with modified independent level of assistance . (Progressing)       Start:  09/18/24    Expected End:  10/02/24               TRANSFERS       Patient will perform bed mobility modified independent level of assistance and bed rails and draw sheet in order to improve safety and independence with mobility (Progressing)       Start:  09/18/24    Expected End:  10/02/24            Patient will complete functional transfer with least restrictive device with modified independent level of assistance. (Progressing)       Start:  09/18/24    Expected End:  10/02/24

## 2024-09-25 NOTE — CARE PLAN
The patient's goals for the shift include Pain control to 4-6 by the end of the shift    The clinical goals for the shift include   Problem: Fall/Injury  Goal: Not fall by end of shift  Outcome: Progressing  Goal: Be free from injury by end of the shift  Outcome: Progressing  Goal: Verbalize understanding of personal risk factors for fall in the hospital  Outcome: Progressing  Goal: Verbalize understanding of risk factor reduction measures to prevent injury from fall in the home  Outcome: Progressing  Goal: Use assistive devices by end of the shift  Outcome: Progressing  Goal: Pace activities to prevent fatigue by end of the shift  Outcome: Progressing     Problem: Skin  Goal: Decreased wound size/increased tissue granulation at next dressing change  Outcome: Progressing  Flowsheets (Taken 9/25/2024 1125)  Decreased wound size/increased tissue granulation at next dressing change:   Promote sleep for wound healing   Protective dressings over bony prominences  Goal: Participates in plan/prevention/treatment measures  Outcome: Progressing  Flowsheets (Taken 9/25/2024 1125)  Participates in plan/prevention/treatment measures:   Discuss with provider PT/OT consult   Elevate heels   Increase activity/out of bed for meals  Goal: Prevent/manage excess moisture  Outcome: Progressing  Flowsheets (Taken 9/25/2024 1125)  Prevent/manage excess moisture:   Cleanse incontinence/protect with barrier cream   Monitor for/manage infection if present   Follow provider orders for dressing changes   Moisturize dry skin  Goal: Prevent/minimize sheer/friction injuries  Outcome: Progressing  Flowsheets (Taken 9/25/2024 1125)  Prevent/minimize sheer/friction injuries:   Complete micro-shifts as needed if patient unable. Adjust patient position to relieve pressure points, not a full turn   Increase activity/out of bed for meals   Use pull sheet   HOB 30 degrees or less   Turn/reposition every 2 hours/use positioning/transfer devices  Goal:  Promote/optimize nutrition  Outcome: Progressing  Flowsheets (Taken 9/25/2024 1125)  Promote/optimize nutrition:   Assist with feeding   Consume > 50% meals/supplements   Offer water/supplements/favorite foods  Goal: Promote skin healing  Outcome: Progressing  Flowsheets (Taken 9/25/2024 1125)  Promote skin healing:   Protective dressings over bony prominences   Turn/reposition every 2 hours/use positioning/transfer devices   Assess skin/pad under line(s)/device(s)     Problem: Pain  Goal: Takes deep breaths with improved pain control throughout the shift  Outcome: Progressing  Goal: Turns in bed with improved pain control throughout the shift  Outcome: Progressing  Goal: Walks with improved pain control throughout the shift  Outcome: Progressing  Goal: Performs ADL's with improved pain control throughout shift  Outcome: Progressing  Goal: Participates in PT with improved pain control throughout the shift  Outcome: Progressing  Goal: Free from opioid side effects throughout the shift  Outcome: Progressing  Goal: Free from acute confusion related to pain meds throughout the shift  Outcome: Progressing

## 2024-09-25 NOTE — PROGRESS NOTES
"Speech-Language Pathology    SLP Adult Inpatient Speech-Language Pathology Clinical Swallow Re-Evaluation    Patient Name: Manuel Bowser  MRN: [id number]  Today's Date: 9/25/2024   Time Calculation  Start Time: 1427  Stop Time: 1440  Time Calculation (min): 13 min         Assessment:  Suspect severe pharyngeal dysphagia  High risk for aspiration w/ PO intake is apparent  Swallowing function likely impacted by mental status and generalized weakness      Recommendations:  NPO  Consider a short term alternative means of nutrition/hydration/medication support.     Frequent, aggressive oral care as tolerated to improve infection control     Medical team reporting that pt's mental status is baseline and goal is for discharge to SNF.  Given that mental status is baseline, recommend MBSS for further evaluation of swallowing function.  Aspiration risk will remain regardless given mental status; medical team made aware.         Plan:  SLP Plan: Skilled SLP  SLP Frequency: 2x per week  Duration:  4 weeks  Discussed POC: Pt, medical team      Goals:  Pt will demonstrate adequate oral phase and initiate effortful swallows with small ice chips x10 reps.              Date initiated: 9/24/24              Expected Time Frame to Meet Goal: 2-3 weeks      Pt will participate in an MBSS for further assessment of swallowing function and to guide diet recommendations.               Date initiated: 9/25/24              Expected Time Frame to Meet Goal: 1 day      Pt/family will indicate understanding of dysphagia education: risk for aspiration, recommendations, and POC with > 80% accuracy via teach back method.              Date initiated: 9/24/24              Expected Time Frame to Meet Goal: 2-3 weeks            Subjective   RN cleared pt for re-evaluation.  Pt properly identified and evaluated bedside. Awake and alert, but difficulty following commands and answering questions.  Room air.  No family present.  Intermittent \"wet\" vocal " "quality and congested cough unrelated to PO intake.       Pt admitted w/ acute liver injury vs failure; hepatic encephalopathy.  PMHx: ETOH use disorder.  Recent recurrent hospitalizations w/ ? Aspiration PNA during admission at an outside facility in 8/2024.  No formal swallowing evaluation completed at that time.      Pt unable to describe baseline swallowing function.         Objective      Cognition:  Command Following: ~ 50% accuracy w/ simple 1-step commands   Attention: Fair   Orientation: Oriented to person only; ST provided reorientation otherwise         Oral/Motor Assessment:  Oral Hygiene: WFL  Dentition: Missing some dentition; dentition in poor condition   Oral Motor: Unable to follow commands for thorough assessment; no overt deficits noted   Voice (Perceptually): WFL w/ cues for adequate vocal volume   Volitional Cough (Perceptually): Unable to follow commands to initiate   Volitional Swallow: Unable to follow commands to initiate             Consistencies Trialed:  Ice chips, sips of water.  Not appropriate for additional trials given apparent severity of swallowing deficits.         Clinical Observations:  Patient Positioning: Upright in Bed  Management of Oral Secretions: ? Aspiration of secretions given \"wet\" vocal quality and intermittent cough   Was The 3 oz Swallow Protocol Completed: No (Deferred at this time 2' to pt performance  w/ single ice chips and sips)     Clinical bedside swallow evaluation completed.  Pt presented with small, single ice chips x3.  Functional bolus containment and manipulation.  Required MAX cues to initiate swallow; swallow response appeared to initiate in 1/3 trials. S/s of aspiration (weak cough) following.  Thin liquids presented via straw x2.  Prompt swallow initiation, but immediate, significant s/s of aspiration in 2/2 trials.  PO trials ceased due to concern for pharyngeal dysphagia/aspiration.       Education:  Education provided to patient regarding NPO, " importance of oral care, and overall dysphagia plan of care.  Pt able to indicate understanding via teach back with <  25% accuracy.  Discussed pt status w/ RN and medical team.       09/25/24 at 4:49 PM - Idalmis Woodard, SLP

## 2024-09-26 ENCOUNTER — APPOINTMENT (OUTPATIENT)
Dept: RADIOLOGY | Facility: HOSPITAL | Age: 64
End: 2024-09-26
Payer: COMMERCIAL

## 2024-09-26 LAB
ATRIAL RATE: 67 BPM
ATRIAL RATE: 78 BPM
CARDIAC TROPONIN I PNL SERPL HS: 31 NG/L (ref 0–53)
P AXIS: 58 DEGREES
P AXIS: 60 DEGREES
P OFFSET: 209 MS
P OFFSET: 209 MS
P ONSET: 160 MS
P ONSET: 162 MS
PR INTERVAL: 120 MS
PR INTERVAL: 122 MS
Q ONSET: 221 MS
Q ONSET: 222 MS
QRS COUNT: 11 BEATS
QRS COUNT: 13 BEATS
QRS DURATION: 80 MS
QRS DURATION: 86 MS
QT INTERVAL: 440 MS
QT INTERVAL: 480 MS
QTC CALCULATION(BAZETT): 501 MS
QTC CALCULATION(BAZETT): 507 MS
QTC FREDERICIA: 480 MS
QTC FREDERICIA: 498 MS
R AXIS: -6 DEGREES
R AXIS: 4 DEGREES
T AXIS: 223 DEGREES
T AXIS: 239 DEGREES
T OFFSET: 441 MS
T OFFSET: 462 MS
VENTRICULAR RATE: 67 BPM
VENTRICULAR RATE: 78 BPM

## 2024-09-26 PROCEDURE — 74230 X-RAY XM SWLNG FUNCJ C+: CPT

## 2024-09-26 PROCEDURE — 2500000004 HC RX 250 GENERAL PHARMACY W/ HCPCS (ALT 636 FOR OP/ED)

## 2024-09-26 PROCEDURE — 74230 X-RAY XM SWLNG FUNCJ C+: CPT | Performed by: STUDENT IN AN ORGANIZED HEALTH CARE EDUCATION/TRAINING PROGRAM

## 2024-09-26 PROCEDURE — 2500000005 HC RX 250 GENERAL PHARMACY W/O HCPCS

## 2024-09-26 PROCEDURE — 36415 COLL VENOUS BLD VENIPUNCTURE: CPT

## 2024-09-26 PROCEDURE — 97110 THERAPEUTIC EXERCISES: CPT | Mod: GP,CQ

## 2024-09-26 PROCEDURE — 84484 ASSAY OF TROPONIN QUANT: CPT

## 2024-09-26 PROCEDURE — 97530 THERAPEUTIC ACTIVITIES: CPT | Mod: GO

## 2024-09-26 PROCEDURE — 1100000001 HC PRIVATE ROOM DAILY

## 2024-09-26 PROCEDURE — 97530 THERAPEUTIC ACTIVITIES: CPT | Mod: GP,CQ

## 2024-09-26 PROCEDURE — 97535 SELF CARE MNGMENT TRAINING: CPT | Mod: GO

## 2024-09-26 PROCEDURE — 92611 MOTION FLUOROSCOPY/SWALLOW: CPT | Mod: GN

## 2024-09-26 PROCEDURE — 99233 SBSQ HOSP IP/OBS HIGH 50: CPT

## 2024-09-26 ASSESSMENT — COGNITIVE AND FUNCTIONAL STATUS - GENERAL
DAILY ACTIVITIY SCORE: 13
DRESSING REGULAR LOWER BODY CLOTHING: A LOT
STANDING UP FROM CHAIR USING ARMS: A LOT
HELP NEEDED FOR BATHING: A LOT
MOVING FROM LYING ON BACK TO SITTING ON SIDE OF FLAT BED WITH BEDRAILS: A LITTLE
MOVING TO AND FROM BED TO CHAIR: TOTAL
TOILETING: A LOT
DRESSING REGULAR UPPER BODY CLOTHING: A LITTLE
DAILY ACTIVITIY SCORE: 12
DRESSING REGULAR LOWER BODY CLOTHING: A LOT
HELP NEEDED FOR BATHING: A LOT
PERSONAL GROOMING: A LITTLE
EATING MEALS: TOTAL
DRESSING REGULAR UPPER BODY CLOTHING: A LOT
MOVING TO AND FROM BED TO CHAIR: A LOT
TURNING FROM BACK TO SIDE WHILE IN FLAT BAD: A LOT
EATING MEALS: TOTAL
WALKING IN HOSPITAL ROOM: TOTAL
MOVING FROM LYING ON BACK TO SITTING ON SIDE OF FLAT BED WITH BEDRAILS: A LOT
PERSONAL GROOMING: A LITTLE
TOILETING: A LOT
CLIMB 3 TO 5 STEPS WITH RAILING: TOTAL
MOBILITY SCORE: 9
STANDING UP FROM CHAIR USING ARMS: A LOT
TURNING FROM BACK TO SIDE WHILE IN FLAT BAD: A LITTLE
CLIMB 3 TO 5 STEPS WITH RAILING: A LOT
MOBILITY SCORE: 14
WALKING IN HOSPITAL ROOM: A LOT

## 2024-09-26 ASSESSMENT — PAIN SCALES - GENERAL
PAINLEVEL_OUTOF10: 0 - NO PAIN

## 2024-09-26 ASSESSMENT — PAIN - FUNCTIONAL ASSESSMENT
PAIN_FUNCTIONAL_ASSESSMENT: 0-10

## 2024-09-26 ASSESSMENT — ACTIVITIES OF DAILY LIVING (ADL): HOME_MANAGEMENT_TIME_ENTRY: 16

## 2024-09-26 NOTE — PROGRESS NOTES
"Manuel Bowser is a 63 y.o. male on day 13 of admission presenting with Alcoholic liver failure (Multi).    Subjective   NAEO, no concerns from nursing.    Pt. is doing significantly better this morning. He is much more alert, oriented, conversational, affect upbeat, and outlook positive. He reports no chest pain, shortness of breath, lightheadedness, dizziness, headache, abdominal pain, constipation, diarrhea, or urinary symptoms.    Review of systems: negative, except as above.      Objective   Last Recorded Vitals  Blood pressure 96/58, pulse 68, temperature 36.1 °C (97 °F), resp. rate 16, height 1.753 m (5' 9\"), weight 75.6 kg (166 lb 10.7 oz), SpO2 98%.    Intake/Output last 3 Shifts:  I/O last 3 completed shifts:  In: 1383.8 (18.3 mL/kg) [I.V.:1283.8 (17 mL/kg); IV Piggyback:100]  Out: 300 (4 mL/kg) [Urine:300 (0.1 mL/kg/hr)]  Weight: 75.6 kg     Physical Exam  Vitals reviewed.   Constitutional:       General: He is not in acute distress.     Appearance: Normal appearance. He is not toxic-appearing.   HENT:      Head: Normocephalic and atraumatic.      Mouth/Throat:      Mouth: Mucous membranes are moist.   Eyes:      Extraocular Movements: Extraocular movements intact.   Cardiovascular:      Rate and Rhythm: Normal rate and regular rhythm.      Pulses: Normal pulses.      Heart sounds: Normal heart sounds. No murmur heard.  Pulmonary:      Effort: Pulmonary effort is normal. No respiratory distress.      Breath sounds: Normal breath sounds. No stridor. No wheezing, rhonchi or rales.   Abdominal:      General: Abdomen is flat. Bowel sounds are normal.      Palpations: Abdomen is soft.      Tenderness: There is no abdominal tenderness. There is no guarding.   Musculoskeletal:         General: Normal range of motion.   Skin:     General: Skin is warm.      Capillary Refill: Capillary refill takes less than 2 seconds.      Coloration: Skin is not jaundiced.      Findings: No bruising.   Neurological:      " General: No focal deficit present.      Mental Status: He is alert and oriented to person, place, and time.     Relevant Results  Scheduled medications  pantoprazole, 40 mg, oral, Daily before breakfast   Or  esomeprazole, 40 mg, nasoduodenal tube, Daily before breakfast   Or  pantoprazole, 40 mg, intravenous, Daily before breakfast  lactulose, 20 g, oral, q8h  levothyroxine, 125 mcg, oral, Daily  lidocaine, 1 patch, transdermal, Daily  [Held by provider] melatonin, 6 mg, oral, Daily  [Held by provider] metoprolol tartrate, 25 mg, oral, BID    Continuous medications     PRN medications  PRN medications: HYDROmorphone, ketorolac, oxyCODONE **OR** oxyCODONE      Assessment/Plan   Alcoholic liver failure (Multi)    Manuel Bowser is a 63 y.o. male with PMH of DM, HTN, HFpEF, alcohol use disorder, pancreatitis who is admitted for evaluation of acute liver injury vs. failure. Complications of such, including hepatic encephalopathy and coagulopathy, have resolved. S/p transjugular liver bx, paracentesis, and hepatic pressure cath on 9/16, c/w portal hypertension. Liver biopsy results will be followed up OP with hepatology. Precert complete for SNF, which is good until 9/30. SLP eval obtained due to observed dysphagia by nursing and deemed him unsafe to swallow given current mental status, thus he is strict NPO. His mental status was much better today, so MBS obtained which he failed, thus remains NPO. Facility cannot accept pt. who is full code that is ruled unsafe for PO intake unless it is comfort care in a DNR patient. Pt. does not have capacity so working with family on GOC and next step.    09/26/24 Updates  - MBS with SLP done today, which was failed, remains NPO  - Working to talk with family about GOC for SNF vs. Home given SLP recs    Acute problems  # Acute liver injury/failure, possible acute on chronic liver disease  # Hepatic Encephalopathy [Resolved]  # Coagulopathy [Resolved]  -Hx of chronic alcohol use  disorder  -Patient presents with acute liver injury with INR >9.5 +/- failure given reported encephalopathy with A&Ox2 at OSH. Currently A&Ox4, no asterixis or c/f current encephalopathy though has been treated with lactulose  -Hepatic function panel severely deranged-Alb-1.7,ALP-804,ALT-278,AST-945  -Hepatitis panel, A1AT, Ceruloplasmin,HSV1&2, Acetaminophen level,AFP,Fibrinogen,MICHAEL are all within normal limits  -Ferritin>9000,serum iron-85 WNL  -EBV DNA<35 detected  -IgG-1600  -AMA-29.3 (uln 24.9, >25 is positive), repeat AMA-9/14 is negative  -liver US shows echogenic appearance of the liver compatible with steatosis and normal Doppler interrogation of the hepatic vasculature. There is mild abdominal ascites and a right pleural effusion   -INR initially as high as 9 on admission, now stable at 1.3  Plan:  -Hepatology signing off, pt to follow up outpatient on 10/9/24  -will follow up on pending hepatic work up  -continue with lactulose 20g TID, titrate prn to 3-4 BM/day   -Monitor Hgb, Coags, signs of bleed  -Daily MELD labs    #Acute anaemia/concern for hemolysis  -Hgb 12.0 to 9.5   - pale but not jaundiced  -Tbili-2.7, indirect-1.4, LDH-846, Haptoglobin<30  -peripheral blood smear-Mild microcytosis with many target cells and many Pappenheimer bodies. Findings consistent with hemoglobinopathy.   -Hb remains stable   Plan:  -monitor Hb     # Traumatic catheterization  -Difficult catheterization at MICU with bleeding.  -patient accidentally urinated without difficulty  Plan:  -Urology consulted; hematuria felt to be due more to supratherapeutic INR than urethral trauma  -Pt voiding on his own; will defer butts or bladder scans unless develops retention    Chronic problems  #HFpEF  #Tachycardia  -limited ECHO -9/13 shows EF of 78%  -CXR without signs of pulmonary edema  -Tachycardia resolved  -on home metop 25mg bid    Prevention of hospital-associated complications:  DVT Ppx: SCDs  Diet: Regular  GI PPx:  PPI  Bowel Regimen: Scheduled lactulose  [place]ake: No  IVF: None  Access/Lines: PIVs  Telemetry: No  Physical Therapy Consult Needed: Yes  Antimicrobials: None    DISPOSITION: SNF, auth good until 9/30     CODE STATUS: Full Code    NOK: Mayela Bowser (Spouse) [phone]       Shiva Guzmán MD  PGY-1 Resident Physician  Department of Anesthesiology & Perioperative Medicine

## 2024-09-26 NOTE — CARE PLAN
The patient's goals for the shift include  safety    The clinical goals for the shift include Patient will remain safe and free from any falls/injuries overnight    No issues overnight

## 2024-09-26 NOTE — PROGRESS NOTES
Per medical team pt not medically ready. Per [facility]NF, insurance now stated pt will need a new auth to admit and auth is no longer good up until 9/30.   1516-Updates sent to SNF for new auth. Will continue to follow.

## 2024-09-26 NOTE — PROGRESS NOTES
Physical Therapy    Physical Therapy Treatment    Patient Name: Manuel Bowser  MRN: [id number]  Department: [place] [address]  Room: 2067/2067  Today's Date: 9/26/2024  Time Calculation  Start Time: 1334  Stop Time: 1413  Time Calculation (min): 39 min         Assessment/Plan   PT Assessment  PT Assessment Results: Decreased strength, Decreased endurance, Impaired balance, Decreased mobility, Decreased cognition  Rehab Prognosis: Fair  End of Session Communication: Bedside nurse, PCT/NA/CTA  End of Session Patient Position: Bed, 3 rail up  PT Plan  Inpatient/Swing Bed or Outpatient: Inpatient  PT Plan  Treatment/Interventions: Bed mobility, Transfer training, Gait training, Balance training, Neuromuscular re-education, Strengthening, Endurance training, Therapeutic exercise, Therapeutic activity, Home exercise program, Positioning, Postural re-education  PT Plan: Ongoing PT  PT Frequency: 3 times per week  PT Discharge Recommendations: Moderate intensity level of continued care  PT Recommended Transfer Status: Total assist  PT - OK to Discharge: Yes (PT eval complete and DC rec made)      General Visit Information:   PT  Visit  PT Received On: 09/26/24  General  Reason for Referral: Presenting as a transfer from [facility] ICU for workup of acute liver injury vs failure and potential transplant evaluation.  Past Medical History Relevant to Rehab: HFpEF, DM, chronic pancreatitis, HTN, DLD, and alcohol use disorder  Missed Visit: No  Missed Visit Reason:  ((.))  Family/Caregiver Present: Yes  Caregiver Feedback: Daughter and Granddaughter present.  Prior to Session Communication: Bedside nurse  Patient Position Received: Bed, 3 rail up, Alarm on  General Comment: Pt. supine in bed upn arrival. Pt.'s daughter in room which (mostly) positively impacted today's session as pt. happy to see his daughter. Pt. reports that he had a procedure for his swallowing today and OT so by end of session pt. feeling overwhelmed.    Subjective    Precautions:  Precautions  Medical Precautions: Fall precautions    Vital Signs (Past 2hrs)                 Objective   Pain:  Pain Assessment  Pain Assessment: 0-10  0-10 (Numeric) Pain Score: 0 - No pain  Cognition:  Cognition  Overall Cognitive Status: Impaired  Coordination:  Movements are Fluid and Coordinated: Yes  Postural Control:  Postural Control  Postural Control:  (fwd head and kyphotic)  Static Sitting Balance  Static Sitting-Balance Support: Feet supported, Bilateral upper extremity supported  Static Sitting-Level of Assistance: Contact guard  Extremity/Trunk Assessments:                      Activity Tolerance:     Treatments:  Therapeutic Exercise  Therapeutic Exercise Performed: Yes  Therapeutic Exercise Activity 1: Increased time due to pt. lack of focus- Supine bilat le ex AP'S, QS, SAQ'S, HS, AND ABD X 15 RPS.    Therapeutic Activity  Therapeutic Activity Performed: Yes  Therapeutic Activity 1: Increased time positioning in bed before ther ex and after sitting EOB - Increased time encouraging pt. to put forth the effort to stand (Increased time given to rest in between activities.)  Therapeutic Activity 2: Pt. sat EOB for approx 15 min -    Bed Mobility  Bed Mobility: Yes  Bed Mobility 1  Bed Mobility 1: Rolling right, Side lying right to sit  Level of Assistance 1: Moderate assistance  Bed Mobility Comments 1: HOB lowered and rail used - mod assist as pt. initially not following instruction along with decreased strength.  Bed Mobility 2  Bed Mobility  2: Sitting to supine  Level of Assistance 2: Moderate assistance, Moderate tactile cues, Moderate verbal cues  Bed Mobility Comments 2: Cues to lift le's however pt. appeared to lose focus at that time.    Outcome Measures:  [facility] Basic Mobility  Turning from your back to your side while in a flat bed without using bedrails: A lot  Moving from lying on your back to sitting on the side of a flat bed without using bedrails: A lot  Moving to and  from bed to chair (including a wheelchair): Total  Standing up from a chair using your arms (e.g. wheelchair or bedside chair): A lot  To walk in hospital room: Total  Climbing 3-5 steps with railing: Total  Basic Mobility - Total Score: 9    Education Documentation  Mobility Training, taught by Irma Lowe PTA at 9/26/2024  2:33 PM.  Learner: Patient  Readiness: Acceptance  Method: Explanation, Demonstration  Response: Needs Reinforcement    Education Comments  No comments found.        OP EDUCATION:       Encounter Problems       Encounter Problems (Active)       Mobility       Pt will perform bed mobility with cga assist.   (Progressing)       Start:  09/17/24    Expected End:  10/01/24            Pt will ambulate >50ft with LRAD and cga Assist with no LOB and VSS.   (Progressing)       Start:  09/17/24    Expected End:  10/01/24            Pt will demonstrate WFL BLE strength to complete therapeutic exercise and participate in functional mobility.   (Progressing)       Start:  09/17/24    Expected End:  10/01/24               PT Transfers       Pt will perform all functional transfers with LRAD and cga assist.   (Progressing)       Start:  09/17/24    Expected End:  10/01/24

## 2024-09-26 NOTE — PROGRESS NOTES
Occupational Therapy    Occupational Therapy Treatment    Name: Manuel Bowser  MRN: [id number]  Department: [facility] 20  Room: [id number]  Date: 09/26/24  Time Calculation  Start Time: 0905  Stop Time: 0937  Time Calculation (min): 32 min    Assessment:  Medical Staff Made Aware: Yes  End of Session Communication: Bedside nurse, PCT/KEY/VICKI  End of Session Patient Position: Up in chair, Alarm on  Plan:  Treatment Interventions:  (ADL retraining; Functional transfer training; UE strengthening/ROM; Endurance training; Patient/family training; Cognitive reorientation; Equipment evaluation/education; Compensatory technique education)  OT Frequency: 3 times per week  OT Discharge Recommendations: Moderate intensity level of continued care  Equipment Recommended upon Discharge:  (TBD AT NEXT LEVEL OF CARE)  OT Recommended Transfer Status: Moderate assist  OT - OK to Discharge: Yes    Subjective   Previous Visit Info:  OT Last Visit  OT Received On: 09/26/24  General:  General  Reason for Referral: Presenting as a transfer from [facility] ICU for workup of acute liver injury vs failure and potential transplant evaluation.  Past Medical History Relevant to Rehab: HFpEF, DM, chronic pancreatitis, HTN, DLD, and alcohol use disorder  Family/Caregiver Present: No  Prior to Session Communication: Bedside nurse, PCT/KEY/VICKI  Patient Position Received: Bed, 3 rail up, Alarm on  Preferred Learning Style: visual, verbal  General Comment: UPON APPROACH PATIENT WAS RESTING IN SUPINE. HE WAS ALERT, EXHIBITED A BRIGHT AFFECT, WAS HIGHLY CONVERSATIONAL, AND REQUESTING TO PARTICIPATE IN OT THERAPY SESSION.  Precautions:  Medical Precautions: Fall precautions    Pain Assessment:  Pain Assessment  Pain Assessment: 0-10  0-10 (Numeric) Pain Score: 0 - No pain (DENIES AT REST AND DURING FXAL TASKS)     Objective   Cognition:  Overall Cognitive Status: Within Functional Limits (WILL CONTINUE TO MONITOR)  Arousal/Alertness: Delayed responses to  stimuli  Orientation Level: Oriented X4  Attention: Within Functional Limits  Activities of Daily Living:      Grooming  Grooming Level of Assistance: Minimal verbal cues, Tactile cues, Setup (REQUIRED FOR SEQUENCING THROUGH TASKS)  Grooming Where Assessed: Bed level  Grooming Comments: ORAL CARE, FACE,  AND HANDS    LE Dressing  LE Dressing: Yes  Sock Level of Assistance: Dependent    Toileting  Toileting Level of Assistance: Dependent  Where Assessed:  (STANDING FOR MANAGEMENT OF PROTECTIVE UNDERGARMENT)    Functional Standing Tolerance:  Functional Standing Tolerance  Time: >/= 2 MINUTES  Bed Mobility/Transfers: Bed Mobility  Bed Mobility: Yes  Bed Mobility 1  Bed Mobility 1: Supine to sitting  Level of Assistance 1: Minimum assistance (HOB ~10* + USE OF BED RAIL)    Transfers  Transfer: Yes  Transfer 1  Transfer From 1:  (MIN A SIT < > STAND FROM EOB AND CHAIR < > WH W)  Transfers 2  Transfer From 2:  (MIN A BED > CHAIR USING WH W)    Sitting Balance:  Static Sitting Balance  Static Sitting-Balance Support: No upper extremity supported, Feet supported  Static Sitting-Level of Assistance: Close supervision  Static Sitting-Comment/Number of Minutes: TOLERATED >/= 10 MINUTES    Standing Balance:  Static Standing Balance  Static Standing-Balance Support: Bilateral upper extremity supported  Static Standing-Level of Assistance: Minimum assistance  Static Standing-Comment/Number of Minutes: TOLERATED >/= 2 MINUTES    Outcome Measures:  [facility] Daily Activity  Putting on and taking off regular lower body clothing: A lot  Bathing (including washing, rinsing, drying): A lot  Putting on and taking off regular upper body clothing: A lot  Toileting, which includes using toilet, bedpan or urinal: A lot  Taking care of personal grooming such as brushing teeth: A little  Eating Meals: Total (N/A; NPO)  Daily Activity - Total Score: 12    Education Documentation  Precautions, taught by Inna Lee OT at 9/26/2024 12:14  PM.  Learner: Patient  Readiness: Eager  Method: Explanation  Response: Verbalizes Understanding    ADL Training, taught by Inna Lee OT at 9/26/2024 12:14 PM.  Learner: Patient  Readiness: Eager  Method: Explanation  Response: Verbalizes Understanding    Education Comments  No comments found.      Goals:  Encounter Problems       Encounter Problems (Active)       ADLs       Patient with complete upper body dressing with modified independent level of assistance donning and doffing all UE clothes with PRN adaptive equipment  (Progressing)       Start:  09/18/24    Expected End:  10/02/24            Patient with complete lower body dressing with modified independent level of assistance donning and doffing all LE clothes  with PRN adaptive equipment  (Progressing)       Start:  09/18/24    Expected End:  10/02/24            Patient will complete daily grooming tasks brushing teeth and washing face/hair with modified independent level of assistance and PRN adaptive equipment  (Progressing)       Start:  09/18/24    Expected End:  10/02/24            Patient will complete toileting including hygiene clothing management/hygiene with modified independent level of assistance . (Progressing)       Start:  09/18/24    Expected End:  10/02/24               TRANSFERS       Patient will perform bed mobility modified independent level of assistance and bed rails and draw sheet in order to improve safety and independence with mobility (Progressing)       Start:  09/18/24    Expected End:  10/02/24            Patient will complete functional transfer with least restrictive device with modified independent level of assistance. (Progressing)       Start:  09/18/24    Expected End:  10/02/24

## 2024-09-26 NOTE — CARE PLAN
The patient's goals for the shift include Pain control to 4-6 by the end of the shift    The clinical goals for the shift include Pt will remain free from falls or injury by the end of this shift.    Over the shift, the patient did make progress toward the following goals. He remained free from falls or injury and sat in his chair for a couple of hours after working with OT. Pt failed his MBS test today. Awaiting updated goals or care.

## 2024-09-26 NOTE — PROCEDURES
Speech-Language Pathology    Inpatient Modified Barium Swallow Study    Patient Name: Manuel Bowser  MRN: [id number]  : 1960  Today's Date: 24  Time Calculation  Start Time: 825  Stop Time: 845  Time Calculation (min): 20 min        Modified Barium Swallow Study completed. Informed verbal consent obtained prior to completion of exam. The study was completed per protocol with various liquid barium consistencies and pudding.  A 1.9 cm or .75 inch (outer diameter) ring was placed on the chin in the lateral view to complete objective measurements during swallowing.  Did not adhere to standard MBSS protocol in its entirety given deficits in swallowing function.  Pt unable to tolerate positioning for AP view.      SLP: DIANE Oropeza   Contact info: [place] or [phone]      Reason for Referral: Further assessment of swallowing function and to guide diet recommendations   Patient Hx:  Pt admitted w/ acute liver injury vs failure; hepatic encephalopathy.  PMHx: ETOH use disorder.  Recent recurrent hospitalizations w/ ? Aspiration PNA during admission at an outside facility in 2024.  S/s of aspiration during bedside clinical swallow evaluation; further evaluation warranted via MBSS.  Respiratory Status: Room air  Current diet:  NPO     Pain:  Pain Scale: 0-10  Ratin    DIET RECOMMENDATIONS:   NPO.  Recommend conversation w/ pt and family re: goals of care.  If goal is to be aggressive w/ treatment, will need to consider an alternative means of nutrition and hydration.     OK for small amounts of ice chips (one at a time, 10x/hour) for oral comfort and to prevent swallow disuse atrophy, but only after aggressive oral care to avoid colonization of bacteria within the oral cavity.    Aspiration PNA mitigation strategies: diligent oral care 2x/day to improve infection control and mobility as tolerated.       SLP PLAN:  Skilled SLP Services: Skilled SLP intervention for dysphagia is  warranted.  SLP Frequency: 2x per week  Duration: 4 weeks   Treatment/Interventions:   - Oropharyngeal exercises  - Patient/caregiver education    Discussed POC: Patient  Discussed Risks/Benefits: Yes  Patient/Caregiver Agreeable: Yes    Short term goals established 09/26/24:   - Patient will complete recommended laryngeal/pharyngeal strengthening exercises for at least 20-30 repetitions during treatment session given minimal-moderate cues.  - Patient/Family will verbalize/demonstrate comprehension of dysphagia education, strategies, recommendations and POC.  - Patient  will complete respiratory muscle strength training (RMST) in order to improve cough strength and airway clearance in the event of penetration/aspiration occurrence.     Education Provided: Results and recommendations per MBSS, with video review; recommendations and POC at this time. Pt w/ reduced insight/understanding.     Repeat Study: Per treating SLP       Mechanics of the Swallow Summary:  ORAL PHASE:  Lip Closure - Interlabial escape/no progression to anterior lip   Bolus prep/mastication - Mastication not assessed   Bolus transport/lingual motion - Slowed Tongue Motion for A-P movement of the bolus   Oral residue - Trace residue lining oral structures     PHARYNGEAL PHASE:  Initiation of pharyngeal swallow - Collection of bolus at the level of the pyriform sinus   Soft palate elevation - No bolus between soft palate/pharyngeal wall   Laryngeal elevation - Minimal superior movement of thyroid cartilage with minimal approximation of arytenoids to epiglottic petiole   Anterior hyoid excursion - Partial anterior movement   Epiglottic movement - Partial inversion  Laryngeal vestibule closure - Incomplete - narrow column of air/contrast in laryngeal vestibule   Pharyngeal stripping wave - Present, however, diminished   Pharyngeal contraction (A/P view) - Not tested       Pharyngoesophageal segment opening - Complete distension and complete  duration/no obstruction of flow of bolus   Pharyngeal residue - Residue within the valleculae and piriforms following the swallow     ESOPHAGEAL PHASE:  Esophageal clearance - Not evaluated       SLP Impressions with Severity Rating:   Pt presents with severe oropharyngeal dysphagia upon completion of modified barium swallow study this date.  Swallowing physiology is detailed above. Impairments in safety and efficiency.  Pt with mistimed and incomplete laryngeal vestibule closure resulting in penetration with all consistencies assessed during the swallow.  Residue noted following the swallow at the valleculae and piriforms with both solids and liquids.  Aspiration noted following the swallow with all consistencies assessed as penetrated material migrated below the level of the vocal folds or was aspirated from residue at the piriforms.  Nonclearing cough response was elicited at times, but aspiration was often SILENT.  Did not clear with cues to cough.  Pt not appropriate for trials of compensatory strategies given mental status.      OUTCOME MEASURES:  Functional Oral Intake Scale  Functional Oral Intake Scale: Level 1        nothing by mouth    Rosenbek's Penetration Aspiration Scale  Thin Liquids: 8. SILENT ASPIRATION - contrast passes glottis, visible residue, NO pt response]   Nectar Thick Liquids: 8. SILENT ASPIRATION - contrast passes glottis, visible residue, NO pt response]   Honey Thick Liquids: 8. SILENT ASPIRATION - contrast passes glottis, visible residue, NO pt response]   Puree: 8. SILENT ASPIRATION - contrast passes glottis, visible residue, NO pt response]       09/26/24 at 5:31 PM - Idalmis Woodard, SLP

## 2024-09-27 ENCOUNTER — APPOINTMENT (OUTPATIENT)
Dept: CARDIOLOGY | Facility: HOSPITAL | Age: 64
End: 2024-09-27
Payer: COMMERCIAL

## 2024-09-27 LAB
ALBUMIN SERPL BCP-MCNC: 1.7 G/DL (ref 3.4–5)
ALBUMIN SERPL BCP-MCNC: 1.7 G/DL (ref 3.4–5)
ALP SERPL-CCNC: 385 U/L (ref 33–136)
ALT SERPL W P-5'-P-CCNC: 45 U/L (ref 10–52)
ANION GAP SERPL CALC-SCNC: 15 MMOL/L (ref 10–20)
APTT PPP: 32 SECONDS (ref 27–38)
AST SERPL W P-5'-P-CCNC: 59 U/L (ref 9–39)
BASOPHILS # BLD AUTO: 0 X10*3/UL (ref 0–0.1)
BASOPHILS NFR BLD AUTO: 0 %
BILIRUB DIRECT SERPL-MCNC: 2 MG/DL (ref 0–0.3)
BILIRUB SERPL-MCNC: 4.3 MG/DL (ref 0–1.2)
BUN SERPL-MCNC: 11 MG/DL (ref 6–23)
BURR CELLS BLD QL SMEAR: NORMAL
CALCIUM SERPL-MCNC: 7.4 MG/DL (ref 8.6–10.6)
CARDIAC TROPONIN I PNL SERPL HS: 134 NG/L (ref 0–53)
CARDIAC TROPONIN I PNL SERPL HS: 143 NG/L (ref 0–53)
CHLORIDE SERPL-SCNC: 105 MMOL/L (ref 98–107)
CO2 SERPL-SCNC: 21 MMOL/L (ref 21–32)
CREAT SERPL-MCNC: 0.84 MG/DL (ref 0.5–1.3)
EGFRCR SERPLBLD CKD-EPI 2021: >90 ML/MIN/1.73M*2
EOSINOPHIL # BLD AUTO: 0.01 X10*3/UL (ref 0–0.7)
EOSINOPHIL NFR BLD AUTO: 0.1 %
ERYTHROCYTE [DISTWIDTH] IN BLOOD BY AUTOMATED COUNT: 20.9 % (ref 11.5–14.5)
GLUCOSE BLD MANUAL STRIP-MCNC: 183 MG/DL (ref 74–99)
GLUCOSE SERPL-MCNC: 79 MG/DL (ref 74–99)
HCT VFR BLD AUTO: 28.2 % (ref 41–52)
HGB BLD-MCNC: 9.3 G/DL (ref 13.5–17.5)
HOWELL-JOLLY BOD BLD QL SMEAR: PRESENT
IMM GRANULOCYTES # BLD AUTO: 0.05 X10*3/UL (ref 0–0.7)
IMM GRANULOCYTES NFR BLD AUTO: 0.5 % (ref 0–0.9)
INR PPP: 1.4 (ref 0.9–1.1)
LYMPHOCYTES # BLD AUTO: 0.97 X10*3/UL (ref 1.2–4.8)
LYMPHOCYTES NFR BLD AUTO: 10.5 %
MAGNESIUM SERPL-MCNC: 2.14 MG/DL (ref 1.6–2.4)
MCH RBC QN AUTO: 28.7 PG (ref 26–34)
MCHC RBC AUTO-ENTMCNC: 33 G/DL (ref 32–36)
MCV RBC AUTO: 87 FL (ref 80–100)
MONOCYTES # BLD AUTO: 0.63 X10*3/UL (ref 0.1–1)
MONOCYTES NFR BLD AUTO: 6.8 %
NEUTROPHILS # BLD AUTO: 7.55 X10*3/UL (ref 1.2–7.7)
NEUTROPHILS NFR BLD AUTO: 82.1 %
NRBC BLD-RTO: 2.6 /100 WBCS (ref 0–0)
OVALOCYTES BLD QL SMEAR: NORMAL
PAPPENHEIMER BOD BLD QL SMEAR: PRESENT
PHOSPHATE SERPL-MCNC: 2.8 MG/DL (ref 2.5–4.9)
PLATELET # BLD AUTO: 52 X10*3/UL (ref 150–450)
POLYCHROMASIA BLD QL SMEAR: NORMAL
POTASSIUM SERPL-SCNC: 4.4 MMOL/L (ref 3.5–5.3)
PROT SERPL-MCNC: 5 G/DL (ref 6.4–8.2)
PROTHROMBIN TIME: 15.5 SECONDS (ref 9.8–12.8)
RBC # BLD AUTO: 3.24 X10*6/UL (ref 4.5–5.9)
RBC MORPH BLD: NORMAL
SODIUM SERPL-SCNC: 137 MMOL/L (ref 136–145)
WBC # BLD AUTO: 9.2 X10*3/UL (ref 4.4–11.3)

## 2024-09-27 PROCEDURE — 82947 ASSAY GLUCOSE BLOOD QUANT: CPT

## 2024-09-27 PROCEDURE — 2500000001 HC RX 250 WO HCPCS SELF ADMINISTERED DRUGS (ALT 637 FOR MEDICARE OP)

## 2024-09-27 PROCEDURE — 82040 ASSAY OF SERUM ALBUMIN: CPT

## 2024-09-27 PROCEDURE — 84484 ASSAY OF TROPONIN QUANT: CPT | Performed by: INTERNAL MEDICINE

## 2024-09-27 PROCEDURE — 93005 ELECTROCARDIOGRAM TRACING: CPT

## 2024-09-27 PROCEDURE — 84484 ASSAY OF TROPONIN QUANT: CPT

## 2024-09-27 PROCEDURE — 2500000004 HC RX 250 GENERAL PHARMACY W/ HCPCS (ALT 636 FOR OP/ED): Performed by: INTERNAL MEDICINE

## 2024-09-27 PROCEDURE — 83735 ASSAY OF MAGNESIUM: CPT

## 2024-09-27 PROCEDURE — 80076 HEPATIC FUNCTION PANEL: CPT | Performed by: STUDENT IN AN ORGANIZED HEALTH CARE EDUCATION/TRAINING PROGRAM

## 2024-09-27 PROCEDURE — 85610 PROTHROMBIN TIME: CPT | Performed by: STUDENT IN AN ORGANIZED HEALTH CARE EDUCATION/TRAINING PROGRAM

## 2024-09-27 PROCEDURE — RXMED WILLOW AMBULATORY MEDICATION CHARGE

## 2024-09-27 PROCEDURE — 36415 COLL VENOUS BLD VENIPUNCTURE: CPT | Performed by: INTERNAL MEDICINE

## 2024-09-27 PROCEDURE — 2500000004 HC RX 250 GENERAL PHARMACY W/ HCPCS (ALT 636 FOR OP/ED)

## 2024-09-27 PROCEDURE — 85025 COMPLETE CBC W/AUTO DIFF WBC: CPT | Performed by: STUDENT IN AN ORGANIZED HEALTH CARE EDUCATION/TRAINING PROGRAM

## 2024-09-27 PROCEDURE — 99233 SBSQ HOSP IP/OBS HIGH 50: CPT

## 2024-09-27 PROCEDURE — 36415 COLL VENOUS BLD VENIPUNCTURE: CPT | Performed by: STUDENT IN AN ORGANIZED HEALTH CARE EDUCATION/TRAINING PROGRAM

## 2024-09-27 PROCEDURE — 1200000002 HC GENERAL ROOM WITH TELEMETRY DAILY

## 2024-09-27 PROCEDURE — 2500000005 HC RX 250 GENERAL PHARMACY W/O HCPCS

## 2024-09-27 RX ORDER — TALC
6 POWDER (GRAM) TOPICAL NIGHTLY PRN
Qty: 60 TABLET | Refills: 0 | Status: SHIPPED | OUTPATIENT
Start: 2024-09-27 | End: 2024-11-26

## 2024-09-27 RX ORDER — DEXTROSE MONOHYDRATE 50 MG/ML
75 INJECTION, SOLUTION INTRAVENOUS CONTINUOUS
Status: DISCONTINUED | OUTPATIENT
Start: 2024-09-27 | End: 2024-09-27

## 2024-09-27 RX ORDER — LACTULOSE 10 G/15ML
20 SOLUTION ORAL EVERY 8 HOURS
Qty: 946 ML | Refills: 1 | Status: SHIPPED | OUTPATIENT
Start: 2024-09-27 | End: 2024-09-30

## 2024-09-27 RX ORDER — LIDOCAINE 560 MG/1
1 PATCH PERCUTANEOUS; TOPICAL; TRANSDERMAL DAILY
Qty: 30 PATCH | Refills: 0 | Status: SHIPPED | OUTPATIENT
Start: 2024-09-28 | End: 2024-10-30

## 2024-09-27 RX ORDER — METOPROLOL TARTRATE 25 MG/1
25 TABLET, FILM COATED ORAL 2 TIMES DAILY
Qty: 60 TABLET | Refills: 1 | Status: SHIPPED | OUTPATIENT
Start: 2024-09-27 | End: 2024-11-26

## 2024-09-27 ASSESSMENT — PAIN SCALES - GENERAL
PAINLEVEL_OUTOF10: 0 - NO PAIN
PAINLEVEL_OUTOF10: 0 - NO PAIN

## 2024-09-27 ASSESSMENT — COGNITIVE AND FUNCTIONAL STATUS - GENERAL
TURNING FROM BACK TO SIDE WHILE IN FLAT BAD: A LITTLE
CLIMB 3 TO 5 STEPS WITH RAILING: A LOT
MOBILITY SCORE: 16
MOVING FROM LYING ON BACK TO SITTING ON SIDE OF FLAT BED WITH BEDRAILS: A LITTLE
STANDING UP FROM CHAIR USING ARMS: A LITTLE
TOILETING: A LOT
DRESSING REGULAR UPPER BODY CLOTHING: A LITTLE
DAILY ACTIVITIY SCORE: 17
MOVING TO AND FROM BED TO CHAIR: A LITTLE
PERSONAL GROOMING: A LITTLE
HELP NEEDED FOR BATHING: A LITTLE
WALKING IN HOSPITAL ROOM: A LOT
DRESSING REGULAR LOWER BODY CLOTHING: A LITTLE
EATING MEALS: A LITTLE

## 2024-09-27 ASSESSMENT — PAIN - FUNCTIONAL ASSESSMENT
PAIN_FUNCTIONAL_ASSESSMENT: 0-10
PAIN_FUNCTIONAL_ASSESSMENT: 0-10

## 2024-09-27 NOTE — SIGNIFICANT EVENT
As patient currently lacks capacity to make his own medical decisions, met with patient's wife at bedside this afternoon for GOC.  Extensive discussion held regarding PT/OT recommendations for SNF given ongoing weakness.  Patient's wife prefers to have patient at home with home care and home PT/OT.  She recalls personal negative experiences at SNF, and she is hesitant to send her  there.  I expressed my concern that patient would likely need around-the-clock therapy and care, and would not get that at home with home services.  Patient's wife understands this, yet still prefers  to go home.    Also discussed SLP assessment of silent aspiration with all consistencies.  Patient's wife adamantly states it is not in line with her 's wishes to have a feeding tube of any kind.  Discussed risks of aspiration with continued p.o. intake.  Patient's wife expressed understanding and was willing to accept the risk of aspiration.    With all this in mind, we will plan to send patient home with home health care, home PT/OT, and continued p.o. intake, keeping in line with patient/wife's wishes despite PT/OT/SLP recommendations.

## 2024-09-27 NOTE — CARE PLAN
The patient's goals for the shift include Pain control to 4-6 by the end of the shift    The clinical goals for the shift include Pt will remain safe and free from falls this shift.    Over the shift, the patient did make progress toward the following goals. He remained free from falls and injury. Pt c/o chest pain this shift. His troponin is elevated and EKG's obtained. MD notified and aware. Pt to be transferred to telemetry when bed is available.

## 2024-09-27 NOTE — PROGRESS NOTES
"Manuel Bowser is a 64 y.o. male on day 14 of admission presenting with Alcoholic liver failure (Multi).    Subjective   NAEO, no concerns from nursing.    Pt. is continues to do well this morning. He is more alert, oriented, conversational, affect upbeat, and outlook positive. He reports no chest pain, shortness of breath, lightheadedness, dizziness, headache, abdominal pain, constipation, diarrhea, or urinary symptoms.    Review of systems: negative, except as above.      Objective   Last Recorded Vitals  Blood pressure 112/76, pulse 82, temperature 36.6 °C (97.9 °F), resp. rate 18, height 1.753 m (5' 9\"), weight 75.5 kg (166 lb 7.2 oz), SpO2 98%.    Intake/Output last 3 Shifts:  No intake/output data recorded.    Physical Exam  Vitals reviewed.   Constitutional:       General: He is not in acute distress.     Appearance: Normal appearance. He is not toxic-appearing.   HENT:      Head: Normocephalic and atraumatic.      Mouth/Throat:      Mouth: Mucous membranes are moist.   Eyes:      Extraocular Movements: Extraocular movements intact.   Cardiovascular:      Rate and Rhythm: Normal rate and regular rhythm.      Pulses: Normal pulses.      Heart sounds: Normal heart sounds. No murmur heard.  Pulmonary:      Effort: Pulmonary effort is normal. No respiratory distress.      Breath sounds: Normal breath sounds. No stridor. No wheezing, rhonchi or rales.   Abdominal:      General: Abdomen is flat. Bowel sounds are normal.      Palpations: Abdomen is soft.      Tenderness: There is no abdominal tenderness. There is no guarding.   Musculoskeletal:         General: Normal range of motion.   Skin:     General: Skin is warm.      Capillary Refill: Capillary refill takes less than 2 seconds.      Coloration: Skin is not jaundiced.      Findings: No bruising.   Neurological:      General: No focal deficit present.      Mental Status: He is alert and oriented to person, place, and time.     Relevant Results  Scheduled " medications  pantoprazole, 40 mg, oral, Daily before breakfast   Or  esomeprazole, 40 mg, nasoduodenal tube, Daily before breakfast   Or  pantoprazole, 40 mg, intravenous, Daily before breakfast  lactulose, 20 g, oral, q8h  levothyroxine, 125 mcg, oral, Daily  lidocaine, 1 patch, transdermal, Daily  [Held by provider] melatonin, 6 mg, oral, Daily  [Held by provider] metoprolol tartrate, 25 mg, oral, BID    Continuous medications  dextrose 5%, 75 mL/hr, Last Rate: 75 mL/hr (09/27/24 1018)      PRN medications  PRN medications: HYDROmorphone, ketorolac, oxyCODONE **OR** oxyCODONE      Assessment/Plan   Alcoholic liver failure (Multi)    Manuel Bowser is a 64 y.o. male with PMH of DM, HTN, HFpEF, alcohol use disorder, pancreatitis who is admitted for evaluation of acute liver injury vs. failure. Complications of such, including hepatic encephalopathy and coagulopathy, have resolved. S/p transjugular liver bx, paracentesis, and hepatic pressure cath on 9/16, c/w portal hypertension. Liver biopsy results will be followed up OP with hepatology. SLP eval with MBS 9/26 deemed pt unsafe to have diet. GOC conversation with family 9/27; they would like pt to go home and to be able to eat, not go to facility. Given this, we are tentatively planning for discharge tomorrow.    09/27/24 Updates  - GOC conversation, family wants pt NPO  - Tentatively planning for discharge 9/28    Acute problems  # Acute liver injury/failure, possible acute on chronic liver disease  # Hepatic Encephalopathy [Resolved]  # Coagulopathy [Resolved]  -Hx of chronic alcohol use disorder  -Patient presents with acute liver injury with INR >9.5 +/- failure given reported encephalopathy with A&Ox2 at OSH. Currently A&Ox4, no asterixis or c/f current encephalopathy though has been treated with lactulose  -Hepatic function panel severely deranged-Alb-1.7,ALP-804,ALT-278,AST-945  -Hepatitis panel, A1AT, Ceruloplasmin,HSV1&2, Acetaminophen  level,AFP,Fibrinogen,MICHAEL are all within normal limits  -Ferritin>9000,serum iron-85 WNL  -EBV DNA<35 detected  -IgG-1600  -AMA-29.3 (uln 24.9, >25 is positive), repeat AMA-9/14 is negative  -liver US shows echogenic appearance of the liver compatible with steatosis and normal Doppler interrogation of the hepatic vasculature. There is mild abdominal ascites and a right pleural effusion   -INR initially as high as 9 on admission, now stable at 1.3  Plan:  -Hepatology signing off, pt to follow up outpatient on 10/9/24  -will follow up on pending hepatic work up  -continue with lactulose 20g TID, titrate prn to 3-4 BM/day   -Monitor Hgb, Coags, signs of bleed  -Daily MELD labs    #Acute anaemia/concern for hemolysis  -Hgb 12.0 to 9.5   - pale but not jaundiced  -Tbili-2.7, indirect-1.4, LDH-846, Haptoglobin<30  -peripheral blood smear-Mild microcytosis with many target cells and many Pappenheimer bodies. Findings consistent with hemoglobinopathy.   -Hb remains stable   Plan:  -monitor Hb     # Traumatic catheterization  -Difficult catheterization at MICU with bleeding.  -patient accidentally urinated without difficulty  Plan:  -Urology consulted; hematuria felt to be due more to supratherapeutic INR than urethral trauma  -Pt voiding on his own; will defer butts or bladder scans unless develops retention    Chronic problems  #HFpEF  #Tachycardia  -limited ECHO -9/13 shows EF of 78%  -CXR without signs of pulmonary edema  -Tachycardia resolved  -on home metop 25mg bid    Prevention of hospital-associated complications:  DVT Ppx: SCDs  Diet: Regular  GI PPx: PPI  Bowel Regimen: Scheduled lactulose  Butts: No  IVF: None  Access/Lines: PIVs  Telemetry: No  Physical Therapy Consult Needed: Yes  Antimicrobials: None    DISPOSITION: home    CODE STATUS: Full Code    NOK: Mayela Bowser (Spouse) [phone]       Shiva Guzmán MD  PGY-1 Resident Physician  Department of Anesthesiology & Perioperative Medicine

## 2024-09-27 NOTE — CARE PLAN
The patient's goals for the shift include safety    The clinical goals for the shift include Patient will remain safe and free from any falls/injuries overnight    No issues overnight     [FreeTextEntry1] : 29 yo M No significant PMHx referred from surgery for vague abdominal pain (RUQ) described as pressure lasts for minutes, cannot tell whether it's "real" not affecting his weight. Pt had an US of the abdomen that revealed a mild dilation in the CBD to 7mm. Pt is not jaundiced. FMhx of cholangiocarcinoma Reviewed the MRI: mild dilation in the extrahepatic bile duct no filling defect, could not find the report.

## 2024-09-28 LAB
CARDIAC TROPONIN I PNL SERPL HS: 101 NG/L (ref 0–53)
CARDIAC TROPONIN I PNL SERPL HS: 127 NG/L (ref 0–53)
GLUCOSE BLD MANUAL STRIP-MCNC: 130 MG/DL (ref 74–99)

## 2024-09-28 PROCEDURE — 2500000001 HC RX 250 WO HCPCS SELF ADMINISTERED DRUGS (ALT 637 FOR MEDICARE OP)

## 2024-09-28 PROCEDURE — 84484 ASSAY OF TROPONIN QUANT: CPT | Performed by: INTERNAL MEDICINE

## 2024-09-28 PROCEDURE — 99232 SBSQ HOSP IP/OBS MODERATE 35: CPT

## 2024-09-28 PROCEDURE — 2500000001 HC RX 250 WO HCPCS SELF ADMINISTERED DRUGS (ALT 637 FOR MEDICARE OP): Performed by: STUDENT IN AN ORGANIZED HEALTH CARE EDUCATION/TRAINING PROGRAM

## 2024-09-28 PROCEDURE — 2500000004 HC RX 250 GENERAL PHARMACY W/ HCPCS (ALT 636 FOR OP/ED)

## 2024-09-28 PROCEDURE — 1200000002 HC GENERAL ROOM WITH TELEMETRY DAILY

## 2024-09-28 PROCEDURE — 82947 ASSAY GLUCOSE BLOOD QUANT: CPT

## 2024-09-28 PROCEDURE — 36415 COLL VENOUS BLD VENIPUNCTURE: CPT | Performed by: INTERNAL MEDICINE

## 2024-09-28 PROCEDURE — 2500000005 HC RX 250 GENERAL PHARMACY W/O HCPCS

## 2024-09-28 RX ORDER — LACTULOSE 10 G/15ML
20 SOLUTION ORAL EVERY 6 HOURS
Status: DISCONTINUED | OUTPATIENT
Start: 2024-09-28 | End: 2024-09-29

## 2024-09-28 ASSESSMENT — PAIN - FUNCTIONAL ASSESSMENT
PAIN_FUNCTIONAL_ASSESSMENT: 0-10

## 2024-09-28 ASSESSMENT — PAIN SCALES - GENERAL
PAINLEVEL_OUTOF10: 0 - NO PAIN
PAINLEVEL_OUTOF10: 5 - MODERATE PAIN
PAINLEVEL_OUTOF10: 9
PAINLEVEL_OUTOF10: 0 - NO PAIN

## 2024-09-28 ASSESSMENT — PAIN DESCRIPTION - LOCATION: LOCATION: ABDOMEN

## 2024-09-28 ASSESSMENT — COGNITIVE AND FUNCTIONAL STATUS - GENERAL
TURNING FROM BACK TO SIDE WHILE IN FLAT BAD: A LITTLE
MOVING TO AND FROM BED TO CHAIR: A LITTLE
MOVING FROM LYING ON BACK TO SITTING ON SIDE OF FLAT BED WITH BEDRAILS: A LITTLE
CLIMB 3 TO 5 STEPS WITH RAILING: A LOT
WALKING IN HOSPITAL ROOM: A LOT
DRESSING REGULAR LOWER BODY CLOTHING: A LITTLE
DAILY ACTIVITIY SCORE: 17
TOILETING: A LOT
STANDING UP FROM CHAIR USING ARMS: A LITTLE
HELP NEEDED FOR BATHING: A LITTLE
DRESSING REGULAR UPPER BODY CLOTHING: A LITTLE
EATING MEALS: A LITTLE
PERSONAL GROOMING: A LITTLE
MOBILITY SCORE: 16

## 2024-09-28 ASSESSMENT — PAIN DESCRIPTION - ORIENTATION: ORIENTATION: MID;LOWER

## 2024-09-28 NOTE — PROGRESS NOTES
"Manuel Bowser is a 64 y.o. male on day 15 of admission presenting with Alcoholic liver failure (Multi).    Subjective   NAEO, no concerns from nursing.    Pt doing well, sleepy, less interactive than prior charting. Per nursing, no Bms yet today, unclear if had any overnight.       Objective   Last Recorded Vitals  Blood pressure 123/84, pulse 88, temperature 36.4 °C (97.5 °F), temperature source Temporal, resp. rate 20, height 1.753 m (5' 9\"), weight 75.5 kg (166 lb 7.2 oz), SpO2 97%.    Intake/Output last 3 Shifts:  I/O last 3 completed shifts:  In: 1141.3 (15.1 mL/kg) [I.V.:641.3 (8.5 mL/kg); IV Piggyback:500]  Out: - (0 mL/kg)   Weight: 75.5 kg     Physical Exam  Vitals reviewed.   Constitutional:       General: He is not in acute distress.     Appearance: Normal appearance. He is not toxic-appearing.   HENT:      Head: Normocephalic and atraumatic.      Mouth/Throat:      Mouth: Mucous membranes are moist.   Eyes:      Extraocular Movements: Extraocular movements intact.   Cardiovascular:      Rate and Rhythm: Normal rate and regular rhythm.      Pulses: Normal pulses.      Heart sounds: Normal heart sounds. No murmur heard.  Pulmonary:      Effort: Pulmonary effort is normal. No respiratory distress.      Breath sounds: Normal breath sounds. No stridor. No wheezing, rhonchi or rales.   Abdominal:      General: Abdomen is flat. Bowel sounds are normal.      Palpations: Abdomen is soft.      Tenderness: There is no abdominal tenderness. There is no guarding.   Musculoskeletal:         General: Normal range of motion.   Skin:     General: Skin is warm.      Capillary Refill: Capillary refill takes less than 2 seconds.      Coloration: Skin is not jaundiced.      Findings: No bruising.   Neurological:      General: No focal deficit present.      Mental Status: He is alert and oriented to person, place, and time.     Relevant Results  Scheduled medications  pantoprazole, 40 mg, oral, Daily before breakfast   " Or  esomeprazole, 40 mg, nasoduodenal tube, Daily before breakfast   Or  pantoprazole, 40 mg, intravenous, Daily before breakfast  lactulose, 20 g, oral, q8h  levothyroxine, 125 mcg, oral, Daily  lidocaine, 1 patch, transdermal, Daily  [Held by provider] melatonin, 6 mg, oral, Daily  [Held by provider] metoprolol tartrate, 25 mg, oral, BID    Continuous medications       PRN medications  PRN medications: ketorolac, oxyCODONE **OR** oxyCODONE      Assessment/Plan   Alcoholic liver failure (Multi)    Manuel Bowser is a 64 y.o. male with PMH of DM, HTN, HFpEF, alcohol use disorder, pancreatitis who is admitted for evaluation of acute liver injury vs. failure. Complications of such, including hepatic encephalopathy and coagulopathy, have resolved. S/p transjugular liver bx, paracentesis, and hepatic pressure cath on 9/16, c/w portal hypertension. Liver biopsy results will be followed up OP with hepatology. SLP eval with MBS 9/26 deemed pt unsafe to have diet. GOC conversation with family 9/27; they would like pt to go home and to be able to eat, not go to facility. Given this, we are tentatively planning for discharge w/ home care once mentation improves and home care set up.     09/28/24 Updates  - Keeping pt on CCD per GOC discussion yetserday  - Reached out to TCC, to work on home care agency for patient  - Troponins downtrended  - No BM today, more altered, will monitor throughout day and go up on lactulose if needed    Acute problems  # Acute liver injury/failure, possible acute on chronic liver disease  # Hepatic Encephalopathy [Resolved]  # Coagulopathy [Resolved]  -Hx of chronic alcohol use disorder  -Patient presents with acute liver injury with INR >9.5 +/- failure given reported encephalopathy with A&Ox2 at OSH. Currently A&Ox4, no asterixis or c/f current encephalopathy though has been treated with lactulose  -Hepatic function panel severely deranged-Alb-1.7,ALP-804,ALT-278,AST-945  -Hepatitis panel,  A1AT, Ceruloplasmin,HSV1&2, Acetaminophen level,AFP,Fibrinogen,MICHAEL are all within normal limits  -Ferritin>9000,serum iron-85 WNL  -EBV DNA<35 detected  -IgG-1600  -AMA-29.3 (uln 24.9, >25 is positive), repeat AMA-9/14 is negative  -liver US shows echogenic appearance of the liver compatible with steatosis and normal Doppler interrogation of the hepatic vasculature. There is mild abdominal ascites and a right pleural effusion   -INR initially as high as 9 on admission, now stable at 1.3  Plan:  -Hepatology signing off, pt to follow up outpatient on 10/9/24  -will follow up on pending hepatic work up  -continue with lactulose 20g TID, titrate prn to 3-4 BM/day   -Monitor Hgb, Coags, signs of bleed  -Daily MELD labs    #Acute anaemia/concern for hemolysis  -Hgb 12.0 to 9.5   - pale but not jaundiced  -Tbili-2.7, indirect-1.4, LDH-846, Haptoglobin<30  -peripheral blood smear-Mild microcytosis with many target cells and many Pappenheimer bodies. Findings consistent with hemoglobinopathy.   -Hb remains stable   Plan:  -monitor Hb     # Traumatic catheterization  -Difficult catheterization at MICU with bleeding.  -patient accidentally urinated without difficulty  Plan:  -Urology consulted; hematuria felt to be due more to supratherapeutic INR than urethral trauma  -Pt voiding on his own; will defer butts or bladder scans unless develops retention    Chronic problems  #HFpEF  #Tachycardia  -limited ECHO -9/13 shows EF of 78%  -CXR without signs of pulmonary edema  -Tachycardia resolved  -on home metop 25mg bid    Prevention of hospital-associated complications:  DVT Ppx: SCDs  Diet: Regular  GI PPx: PPI  Bowel Regimen: Scheduled lactulose  Butts: No  IVF: None  Access/Lines: PIVs  Telemetry: No  Physical Therapy Consult Needed: Yes  Antimicrobials: None    DISPOSITION: home    CODE STATUS: Full Code    NOK: Mayela Bowser (Spouse) [phone]     Sarah Mata  Med-Peds PGY4

## 2024-09-28 NOTE — PROGRESS NOTES
9/28/24 1335 Transitional Care Coordinator Notes:    Transitional Care Coordination Progress Note:  Patient discussed during interdisciplinary rounds.   Team members present: MD and TCC  Plan per Medical/Surgical team: Per Robert Team, family now wants to take patient home instead of discharging to a SNF facility. Family is interested in home care. Patient does have Medicaid so may not be able to secure home care. Referrals were placed to several agencies, awaiting response. Team may need to place a [facility] referral.  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

## 2024-09-28 NOTE — CARE PLAN
The patient's goals for the shift include Pain control to 4-6 by the end of the shift    The clinical goals for the shift include Pt will remain HDS this shift    Problem: Fall/Injury  Goal: Not fall by end of shift  Outcome: Progressing  Goal: Be free from injury by end of the shift  Outcome: Progressing     Problem: Skin  Goal: Decreased wound size/increased tissue granulation at next dressing change  Outcome: Progressing  Flowsheets (Taken 9/28/2024 0401)  Decreased wound size/increased tissue granulation at next dressing change:   Protective dressings over bony prominences   Promote sleep for wound healing  Goal: Promote skin healing  Outcome: Progressing  Flowsheets (Taken 9/28/2024 0401)  Promote skin healing: Turn/reposition every 2 hours/use positioning/transfer devices     Problem: Pain  Goal: Takes deep breaths with improved pain control throughout the shift  Outcome: Progressing  Goal: Walks with improved pain control throughout the shift  Outcome: Progressing  Goal: Performs ADL's with improved pain control throughout shift  Outcome: Progressing

## 2024-09-29 ENCOUNTER — APPOINTMENT (OUTPATIENT)
Dept: CARDIOLOGY | Facility: HOSPITAL | Age: 64
End: 2024-09-29
Payer: COMMERCIAL

## 2024-09-29 VITALS
WEIGHT: 151.9 LBS | RESPIRATION RATE: 17 BRPM | TEMPERATURE: 97.2 F | HEIGHT: 69 IN | SYSTOLIC BLOOD PRESSURE: 121 MMHG | OXYGEN SATURATION: 99 % | BODY MASS INDEX: 22.5 KG/M2 | DIASTOLIC BLOOD PRESSURE: 84 MMHG | HEART RATE: 107 BPM

## 2024-09-29 LAB
ACANTHOCYTES BLD QL SMEAR: NORMAL
ALBUMIN SERPL BCP-MCNC: 1.5 G/DL (ref 3.4–5)
ALP SERPL-CCNC: 357 U/L (ref 33–136)
ALT SERPL W P-5'-P-CCNC: 48 U/L (ref 10–52)
ANION GAP SERPL CALC-SCNC: 11 MMOL/L (ref 10–20)
APTT PPP: 22 SECONDS (ref 27–38)
AST SERPL W P-5'-P-CCNC: 61 U/L (ref 9–39)
BASOPHILS # BLD AUTO: 0.01 X10*3/UL (ref 0–0.1)
BASOPHILS NFR BLD AUTO: 0.1 %
BILIRUB DIRECT SERPL-MCNC: 2.6 MG/DL (ref 0–0.3)
BILIRUB SERPL-MCNC: 4.4 MG/DL (ref 0–1.2)
BUN SERPL-MCNC: 8 MG/DL (ref 6–23)
BURR CELLS BLD QL SMEAR: NORMAL
CALCIUM SERPL-MCNC: 7 MG/DL (ref 8.6–10.6)
CARDIAC TROPONIN I PNL SERPL HS: 104 NG/L (ref 0–53)
CHLORIDE SERPL-SCNC: 105 MMOL/L (ref 98–107)
CO2 SERPL-SCNC: 24 MMOL/L (ref 21–32)
CREAT SERPL-MCNC: 0.75 MG/DL (ref 0.5–1.3)
EGFRCR SERPLBLD CKD-EPI 2021: >90 ML/MIN/1.73M*2
EOSINOPHIL # BLD AUTO: 0 X10*3/UL (ref 0–0.7)
EOSINOPHIL NFR BLD AUTO: 0 %
ERYTHROCYTE [DISTWIDTH] IN BLOOD BY AUTOMATED COUNT: 20.8 % (ref 11.5–14.5)
GLUCOSE BLD MANUAL STRIP-MCNC: 102 MG/DL (ref 74–99)
GLUCOSE BLD MANUAL STRIP-MCNC: 120 MG/DL (ref 74–99)
GLUCOSE BLD MANUAL STRIP-MCNC: 89 MG/DL (ref 74–99)
GLUCOSE SERPL-MCNC: 99 MG/DL (ref 74–99)
HCT VFR BLD AUTO: 24.8 % (ref 41–52)
HGB BLD-MCNC: 8.4 G/DL (ref 13.5–17.5)
HOWELL-JOLLY BOD BLD QL SMEAR: PRESENT
IMM GRANULOCYTES # BLD AUTO: 0.02 X10*3/UL (ref 0–0.7)
IMM GRANULOCYTES NFR BLD AUTO: 0.3 % (ref 0–0.9)
INR PPP: 1.5 (ref 0.9–1.1)
LYMPHOCYTES # BLD AUTO: 0.99 X10*3/UL (ref 1.2–4.8)
LYMPHOCYTES NFR BLD AUTO: 12.5 %
MAGNESIUM SERPL-MCNC: 1.65 MG/DL (ref 1.6–2.4)
MCH RBC QN AUTO: 29.2 PG (ref 26–34)
MCHC RBC AUTO-ENTMCNC: 33.9 G/DL (ref 32–36)
MCV RBC AUTO: 86 FL (ref 80–100)
MONOCYTES # BLD AUTO: 0.48 X10*3/UL (ref 0.1–1)
MONOCYTES NFR BLD AUTO: 6.1 %
NEUTROPHILS # BLD AUTO: 6.42 X10*3/UL (ref 1.2–7.7)
NEUTROPHILS NFR BLD AUTO: 81 %
NRBC BLD-RTO: 1.6 /100 WBCS (ref 0–0)
OVALOCYTES BLD QL SMEAR: NORMAL
PAPPENHEIMER BOD BLD QL SMEAR: PRESENT
PHOSPHATE SERPL-MCNC: 2.6 MG/DL (ref 2.5–4.9)
PLATELET # BLD AUTO: 46 X10*3/UL (ref 150–450)
POTASSIUM SERPL-SCNC: 3.6 MMOL/L (ref 3.5–5.3)
PROT SERPL-MCNC: 4.4 G/DL (ref 6.4–8.2)
PROTHROMBIN TIME: 17.4 SECONDS (ref 9.8–12.8)
RBC # BLD AUTO: 2.88 X10*6/UL (ref 4.5–5.9)
RBC MORPH BLD: NORMAL
SODIUM SERPL-SCNC: 136 MMOL/L (ref 136–145)
TARGETS BLD QL SMEAR: NORMAL
WBC # BLD AUTO: 7.9 X10*3/UL (ref 4.4–11.3)

## 2024-09-29 PROCEDURE — 85025 COMPLETE CBC W/AUTO DIFF WBC: CPT | Performed by: STUDENT IN AN ORGANIZED HEALTH CARE EDUCATION/TRAINING PROGRAM

## 2024-09-29 PROCEDURE — 99232 SBSQ HOSP IP/OBS MODERATE 35: CPT

## 2024-09-29 PROCEDURE — 80076 HEPATIC FUNCTION PANEL: CPT | Performed by: STUDENT IN AN ORGANIZED HEALTH CARE EDUCATION/TRAINING PROGRAM

## 2024-09-29 PROCEDURE — 2500000001 HC RX 250 WO HCPCS SELF ADMINISTERED DRUGS (ALT 637 FOR MEDICARE OP)

## 2024-09-29 PROCEDURE — 2500000004 HC RX 250 GENERAL PHARMACY W/ HCPCS (ALT 636 FOR OP/ED)

## 2024-09-29 PROCEDURE — 85610 PROTHROMBIN TIME: CPT | Performed by: STUDENT IN AN ORGANIZED HEALTH CARE EDUCATION/TRAINING PROGRAM

## 2024-09-29 PROCEDURE — 82947 ASSAY GLUCOSE BLOOD QUANT: CPT

## 2024-09-29 PROCEDURE — 84484 ASSAY OF TROPONIN QUANT: CPT

## 2024-09-29 PROCEDURE — 1200000002 HC GENERAL ROOM WITH TELEMETRY DAILY

## 2024-09-29 PROCEDURE — 93005 ELECTROCARDIOGRAM TRACING: CPT

## 2024-09-29 PROCEDURE — 36415 COLL VENOUS BLD VENIPUNCTURE: CPT | Performed by: STUDENT IN AN ORGANIZED HEALTH CARE EDUCATION/TRAINING PROGRAM

## 2024-09-29 PROCEDURE — 84100 ASSAY OF PHOSPHORUS: CPT | Performed by: STUDENT IN AN ORGANIZED HEALTH CARE EDUCATION/TRAINING PROGRAM

## 2024-09-29 PROCEDURE — 36415 COLL VENOUS BLD VENIPUNCTURE: CPT

## 2024-09-29 PROCEDURE — 83735 ASSAY OF MAGNESIUM: CPT

## 2024-09-29 RX ORDER — SODIUM,POTASSIUM PHOSPHATES 280-250MG
1 POWDER IN PACKET (EA) ORAL
Status: COMPLETED | OUTPATIENT
Start: 2024-09-29 | End: 2024-09-29

## 2024-09-29 RX ORDER — DEXTROSE, SODIUM CHLORIDE, SODIUM LACTATE, POTASSIUM CHLORIDE, AND CALCIUM CHLORIDE 5; .6; .31; .03; .02 G/100ML; G/100ML; G/100ML; G/100ML; G/100ML
75 INJECTION, SOLUTION INTRAVENOUS CONTINUOUS
Status: DISCONTINUED | OUTPATIENT
Start: 2024-09-29 | End: 2024-09-30

## 2024-09-29 RX ORDER — LACTULOSE 10 G/15ML
20 SOLUTION ORAL EVERY 4 HOURS
Status: DISCONTINUED | OUTPATIENT
Start: 2024-09-29 | End: 2024-10-01 | Stop reason: HOSPADM

## 2024-09-29 RX ORDER — MAGNESIUM SULFATE HEPTAHYDRATE 40 MG/ML
2 INJECTION, SOLUTION INTRAVENOUS ONCE
Status: DISCONTINUED | OUTPATIENT
Start: 2024-09-29 | End: 2024-10-01 | Stop reason: HOSPADM

## 2024-09-29 RX ORDER — LIDOCAINE HYDROCHLORIDE 10 MG/ML
5 INJECTION, SOLUTION EPIDURAL; INFILTRATION; INTRACAUDAL; PERINEURAL ONCE
Status: DISCONTINUED | OUTPATIENT
Start: 2024-09-29 | End: 2024-10-01 | Stop reason: HOSPADM

## 2024-09-29 ASSESSMENT — COGNITIVE AND FUNCTIONAL STATUS - GENERAL
MOVING FROM LYING ON BACK TO SITTING ON SIDE OF FLAT BED WITH BEDRAILS: A LITTLE
DRESSING REGULAR LOWER BODY CLOTHING: A LITTLE
WALKING IN HOSPITAL ROOM: A LOT
MOBILITY SCORE: 16
TURNING FROM BACK TO SIDE WHILE IN FLAT BAD: A LITTLE
PERSONAL GROOMING: A LITTLE
EATING MEALS: A LITTLE
MOVING TO AND FROM BED TO CHAIR: A LITTLE
TOILETING: A LOT
DRESSING REGULAR UPPER BODY CLOTHING: A LITTLE
HELP NEEDED FOR BATHING: A LITTLE
DAILY ACTIVITIY SCORE: 17
STANDING UP FROM CHAIR USING ARMS: A LITTLE
CLIMB 3 TO 5 STEPS WITH RAILING: A LOT

## 2024-09-29 ASSESSMENT — PAIN SCALES - GENERAL
PAINLEVEL_OUTOF10: 5 - MODERATE PAIN
PAINLEVEL_OUTOF10: 0 - NO PAIN

## 2024-09-29 ASSESSMENT — PAIN - FUNCTIONAL ASSESSMENT: PAIN_FUNCTIONAL_ASSESSMENT: 0-10

## 2024-09-29 NOTE — PROGRESS NOTES
"Manuel Bowser is a 64 y.o. male on day 16 of admission presenting with Alcoholic liver failure (Multi).    Subjective   NAEO, no concerns from nursing.    Pt. is doing fine this morning. He is quite confused this morning, reporting repeatedly that he is tired. He has no symptomatic complaints. 3x bowel movements yesterday.    Review of systems: negative, except as above.      Objective   Last Recorded Vitals  Blood pressure 107/75, pulse 104, temperature 37 °C (98.6 °F), resp. rate 17, height 1.753 m (5' 9\"), weight 75.5 kg (166 lb 7.2 oz), SpO2 98%.    Intake/Output last 3 Shifts:  I/O last 3 completed shifts:  In: 50 (0.7 mL/kg) [P.O.:50]  Out: - (0 mL/kg)   Weight: 75.5 kg     Physical Exam  Vitals reviewed.   Constitutional:       General: He is not in acute distress.     Appearance: Normal appearance. He is not toxic-appearing.   HENT:      Head: Normocephalic and atraumatic.      Mouth/Throat:      Mouth: Mucous membranes are moist.   Eyes:      Extraocular Movements: Extraocular movements intact.   Cardiovascular:      Rate and Rhythm: Normal rate and regular rhythm.      Pulses: Normal pulses.      Heart sounds: Normal heart sounds. No murmur heard.  Pulmonary:      Effort: Pulmonary effort is normal. No respiratory distress.      Breath sounds: Normal breath sounds. No stridor. No wheezing, rhonchi or rales.   Abdominal:      General: Abdomen is flat. Bowel sounds are normal.      Palpations: Abdomen is soft.      Tenderness: There is no abdominal tenderness. There is no guarding.   Musculoskeletal:         General: Normal range of motion.      Right lower leg: No edema.      Left lower leg: No edema.   Skin:     General: Skin is warm.      Capillary Refill: Capillary refill takes less than 2 seconds.      Coloration: Skin is not jaundiced.      Findings: No bruising.   Neurological:      General: No focal deficit present.      Mental Status: He is alert.      Comments: Alert, oriented to only place. Has " delayed response to commands. Overall confused.     Relevant Results  Scheduled medications  pantoprazole, 40 mg, oral, Daily before breakfast   Or  esomeprazole, 40 mg, nasoduodenal tube, Daily before breakfast   Or  pantoprazole, 40 mg, intravenous, Daily before breakfast  lactulose, 20 g, oral, q6h  levothyroxine, 125 mcg, oral, Daily  lidocaine, 1 patch, transdermal, Daily  [Held by provider] melatonin, 6 mg, oral, Daily  [Held by provider] metoprolol tartrate, 25 mg, oral, BID    Continuous medications       PRN medications  PRN medications: oxyCODONE **OR** oxyCODONE      Assessment/Plan   Alcoholic liver failure (Multi)    Manuel Bowser is a 64 y.o. male with PMH of DM, HTN, HFpEF, alcohol use disorder, pancreatitis who is admitted for evaluation of acute liver injury vs. failure. Complications of such, including hepatic encephalopathy and coagulopathy, have resolved. S/p transjugular liver bx, paracentesis, and hepatic pressure cath on 9/16, c/w portal hypertension. Liver biopsy results will be followed up OP with hepatology. SLP eval with MBS 9/26 deemed pt unsafe to have diet. GOC conversation with family 9/27; they would like pt to go home and to be able to eat, not go to facility. Given this, we are tentatively planning for discharge w/ home care once mentation improves and home care set up.    09/29/24 Updates  - Plan for discharge home once mentation improves    Acute problems  # Acute liver injury/failure, possible acute on chronic liver disease  # Hepatic Encephalopathy  # Coagulopathy [Resolved]  -Hx of chronic alcohol use disorder  -Patient presents with acute liver injury with INR >9.5 +/- failure given reported encephalopathy with A&Ox2 at OSH. Currently A&Ox4, no asterixis or c/f current encephalopathy though has been treated with lactulose  -Hepatic function panel severely deranged-Alb-1.7,ALP-804,ALT-278,AST-945  -Hepatitis panel, A1AT, Ceruloplasmin,HSV1&2, Acetaminophen  level,AFP,Fibrinogen,MICHAEL are all within normal limits  -Ferritin>9000,serum iron-85 WNL  -EBV DNA<35 detected  -IgG-1600  -AMA-29.3 (uln 24.9, >25 is positive), repeat AMA-9/14 is negative  -liver US shows echogenic appearance of the liver compatible with steatosis and normal Doppler interrogation of the hepatic vasculature. There is mild abdominal ascites and a right pleural effusion   -INR initially as high as 9 on admission, now stable at 1.3  Plan:  -Hepatology signing off, pt to follow up outpatient on 10/9/24  -will follow up on pending hepatic work up  -continue with lactulose 20g TID, titrate prn to 3-4 BM/day   -Monitor Hgb, Coags, signs of bleed  -Daily MELD labs     #Acute anaemia/concern for hemolysis  -Hgb 12.0 to 9.5   - pale but not jaundiced  -Tbili-2.7, indirect-1.4, LDH-846, Haptoglobin<30  -peripheral blood smear-Mild microcytosis with many target cells and many Pappenheimer bodies. Findings consistent with hemoglobinopathy.   -Hb remains stable   Plan:  -monitor Hb     # Traumatic catheterization  -Difficult catheterization at MICU with bleeding.  -patient accidentally urinated without difficulty  Plan:  -Urology consulted; hematuria felt to be due more to supratherapeutic INR than urethral trauma  -Pt voiding on his own; will defer butts or bladder scans unless develops retention     Chronic problems  #HFpEF  #Tachycardia [Resolved]  - Limited ECHO -9/13 shows EF of 78%  - CXR without signs of pulmonary edema  - Holding home metop 25mg bid  - OP titration of GDMT     Prevention of hospital-associated complications:  DVT Ppx: SCDs  Diet: Regular  GI PPx: PPI  Bowel Regimen: Scheduled lactulose  Butts: No  IVF: None  Access/Lines: PIVs  Telemetry: Yes  Physical Therapy Consult Needed: Yes  Antimicrobials: None     DISPOSITION: home    CODE STATUS: Full Code    NOK: Mayela Bowser (Spouse) [phone]       Shiva Guzmán MD  PGY-1 Resident Physician  Department of Anesthesiology &  Perioperative Medicine

## 2024-09-29 NOTE — CARE PLAN
The patient's goals for the shift include Pain control to 4-6 by the end of the shift    The clinical goals for the shift include PT WILL HAVE 1 OR MORE BM DURING THIS SHIFT      Problem: Skin  Goal: Decreased wound size/increased tissue granulation at next dressing change  Outcome: Progressing  Flowsheets (Taken 9/28/2024 0401 by Megan Cunningham, RN)  Decreased wound size/increased tissue granulation at next dressing change:   Protective dressings over bony prominences   Promote sleep for wound healing  Goal: Participates in plan/prevention/treatment measures  Outcome: Progressing  Flowsheets (Taken 9/25/2024 2037 by Jacquelyn Lucero, ESEQUIEL)  Participates in plan/prevention/treatment measures:   Discuss with provider PT/OT consult   Elevate heels   Increase activity/out of bed for meals  Goal: Prevent/manage excess moisture  Outcome: Progressing  Flowsheets (Taken 9/25/2024 1125 by Jenna Reyes RN)  Prevent/manage excess moisture:   Cleanse incontinence/protect with barrier cream   Monitor for/manage infection if present   Follow provider orders for dressing changes   Moisturize dry skin  Goal: Prevent/minimize sheer/friction injuries  Outcome: Progressing  Flowsheets (Taken 9/29/2024 1827)  Prevent/minimize sheer/friction injuries:   HOB 30 degrees or less   Turn/reposition every 2 hours/use positioning/transfer devices  Goal: Promote/optimize nutrition  Outcome: Progressing  Flowsheets (Taken 9/29/2024 1827)  Promote/optimize nutrition: Offer water/supplements/favorite foods  Goal: Promote skin healing  Outcome: Progressing  Flowsheets (Taken 9/28/2024 0401 by Megan Cunningham, ESEQUIEL)  Promote skin healing: Turn/reposition every 2 hours/use positioning/transfer devices

## 2024-09-29 NOTE — NURSING NOTE
9/29/24 1245 VAST RN consulted for PIV restart. 1 attempted made, no sustainable veins noted on ultrasound. Bedside RN aware.

## 2024-09-29 NOTE — SIGNIFICANT EVENT
"At ~1400 today I was asked to come to bedside to speak with family members present who had questions regarding the care of Mr. Bowser. The family members present were his two sisters, a nephew, and a nephew's partner. I answered questions regarding the pts clinical status, our ongoing therapeutic plan, and the complications we have encountered and how we are working rectify them. Specifically, I spoke about him missing doses of lactulose and the redevelopment of his hepatic encephalopathy as a result. Additionally, I spoke about his aspiration risk, our medical recommendation for a Dobhoff tube, and the pts wife's decision to not obtain that because it was not in alignment with what she believed his goals were. Additionally, I updated the family on the wife's decision to have him discharged home (vs. SNF, which was our recommended) and with the full ability to eat and drink as he wishes. The family became quite upset upon hearing this. The sisters voiced concern that this would be an unsafe discharge as she is unable to take care of him. The nephew expanded that the wife is reliant on the pts care as she is unable to perform ADLs. Even more, he mentioned concern that the wife wants Mr. Bowser home to \"take his government checks\" and that he \"has inside sources that told him.\" I explained to the family present that because Mr. Bowser does not have a healthcare durable power of , the wife would be the next of kin and surrogate decision maker. They expressed concern that she was unable to make safe decisions for him.    Following the conversation with family, an Ethics consult was placed.  "

## 2024-09-29 NOTE — CARE PLAN
Problem: Fall/Injury  Goal: Not fall by end of shift  Outcome: Progressing  Goal: Be free from injury by end of the shift  Outcome: Progressing  Goal: Verbalize understanding of personal risk factors for fall in the hospital  Outcome: Progressing  Goal: Verbalize understanding of risk factor reduction measures to prevent injury from fall in the home  Outcome: Progressing  Goal: Use assistive devices by end of the shift  Outcome: Progressing  Goal: Pace activities to prevent fatigue by end of the shift  Outcome: Progressing   The patient's goals for the shift include Pain control to 4-6 by the end of the shift    The clinical goals for the shift include pt will have 1 BM this shift

## 2024-09-30 ENCOUNTER — PHARMACY VISIT (OUTPATIENT)
Dept: PHARMACY | Facility: CLINIC | Age: 64
End: 2024-09-30
Payer: MEDICARE

## 2024-09-30 LAB
ALBUMIN SERPL BCP-MCNC: <1.5 G/DL (ref 3.4–5)
ALP SERPL-CCNC: 332 U/L (ref 33–136)
ALT SERPL W P-5'-P-CCNC: 41 U/L (ref 10–52)
ANION GAP SERPL CALC-SCNC: 10 MMOL/L (ref 10–20)
AST SERPL W P-5'-P-CCNC: 61 U/L (ref 9–39)
ATRIAL RATE: 101 BPM
ATRIAL RATE: 77 BPM
ATRIAL RATE: 81 BPM
BASOPHILS # BLD AUTO: 0.01 X10*3/UL (ref 0–0.1)
BASOPHILS NFR BLD AUTO: 0.1 %
BILIRUB DIRECT SERPL-MCNC: 2.4 MG/DL (ref 0–0.3)
BILIRUB SERPL-MCNC: 4.1 MG/DL (ref 0–1.2)
BUN SERPL-MCNC: 7 MG/DL (ref 6–23)
BURR CELLS BLD QL SMEAR: NORMAL
CALCIUM SERPL-MCNC: 6.9 MG/DL (ref 8.6–10.6)
CARDIAC TROPONIN I PNL SERPL HS: 104 NG/L (ref 0–53)
CARDIAC TROPONIN I PNL SERPL HS: 131 NG/L (ref 0–53)
CHLORIDE SERPL-SCNC: 109 MMOL/L (ref 98–107)
CO2 SERPL-SCNC: 24 MMOL/L (ref 21–32)
CREAT SERPL-MCNC: 0.67 MG/DL (ref 0.5–1.3)
EGFRCR SERPLBLD CKD-EPI 2021: >90 ML/MIN/1.73M*2
EOSINOPHIL # BLD AUTO: 0.02 X10*3/UL (ref 0–0.7)
EOSINOPHIL NFR BLD AUTO: 0.2 %
ERYTHROCYTE [DISTWIDTH] IN BLOOD BY AUTOMATED COUNT: 22.2 % (ref 11.5–14.5)
GLUCOSE BLD MANUAL STRIP-MCNC: 105 MG/DL (ref 74–99)
GLUCOSE BLD MANUAL STRIP-MCNC: 126 MG/DL (ref 74–99)
GLUCOSE BLD MANUAL STRIP-MCNC: 134 MG/DL (ref 74–99)
GLUCOSE BLD MANUAL STRIP-MCNC: 139 MG/DL (ref 74–99)
GLUCOSE SERPL-MCNC: 134 MG/DL (ref 74–99)
HCT VFR BLD AUTO: 25.4 % (ref 41–52)
HGB BLD-MCNC: 8 G/DL (ref 13.5–17.5)
HYPOCHROMIA BLD QL SMEAR: NORMAL
IMM GRANULOCYTES # BLD AUTO: 0.04 X10*3/UL (ref 0–0.7)
IMM GRANULOCYTES NFR BLD AUTO: 0.5 % (ref 0–0.9)
LABORATORY COMMENT REPORT: NORMAL
LYMPHOCYTES # BLD AUTO: 1.2 X10*3/UL (ref 1.2–4.8)
LYMPHOCYTES NFR BLD AUTO: 13.6 %
MAGNESIUM SERPL-MCNC: 1.94 MG/DL (ref 1.6–2.4)
MCH RBC QN AUTO: 28.2 PG (ref 26–34)
MCHC RBC AUTO-ENTMCNC: 31.5 G/DL (ref 32–36)
MCV RBC AUTO: 89 FL (ref 80–100)
MONOCYTES # BLD AUTO: 0.71 X10*3/UL (ref 0.1–1)
MONOCYTES NFR BLD AUTO: 8 %
NEUTROPHILS # BLD AUTO: 6.87 X10*3/UL (ref 1.2–7.7)
NEUTROPHILS NFR BLD AUTO: 77.6 %
NRBC BLD-RTO: 1.1 /100 WBCS (ref 0–0)
P AXIS: 44 DEGREES
P AXIS: 54 DEGREES
P AXIS: 64 DEGREES
P OFFSET: 210 MS
P OFFSET: 212 MS
P OFFSET: 213 MS
P ONSET: 159 MS
P ONSET: 161 MS
P ONSET: 161 MS
PAPPENHEIMER BOD BLD QL SMEAR: PRESENT
PATH REPORT.FINAL DX SPEC: NORMAL
PATH REPORT.GROSS SPEC: NORMAL
PATH REPORT.RELEVANT HX SPEC: NORMAL
PATH REPORT.TOTAL CANCER: NORMAL
PHOSPHATE SERPL-MCNC: 2.2 MG/DL (ref 2.5–4.9)
PLATELET # BLD AUTO: 44 X10*3/UL (ref 150–450)
POLYCHROMASIA BLD QL SMEAR: NORMAL
POTASSIUM SERPL-SCNC: 3.5 MMOL/L (ref 3.5–5.3)
PR INTERVAL: 124 MS
PR INTERVAL: 136 MS
PR INTERVAL: 142 MS
PROT SERPL-MCNC: 4.1 G/DL (ref 6.4–8.2)
Q ONSET: 223 MS
Q ONSET: 229 MS
Q ONSET: 230 MS
QRS COUNT: 13 BEATS
QRS COUNT: 13 BEATS
QRS COUNT: 16 BEATS
QRS DURATION: 62 MS
QRS DURATION: 62 MS
QRS DURATION: 76 MS
QT INTERVAL: 378 MS
QT INTERVAL: 408 MS
QT INTERVAL: 418 MS
QTC CALCULATION(BAZETT): 473 MS
QTC CALCULATION(BAZETT): 473 MS
QTC CALCULATION(BAZETT): 490 MS
QTC FREDERICIA: 449 MS
QTC FREDERICIA: 450 MS
QTC FREDERICIA: 454 MS
R AXIS: -29 DEGREES
R AXIS: -31 DEGREES
R AXIS: -9 DEGREES
RBC # BLD AUTO: 2.84 X10*6/UL (ref 4.5–5.9)
RBC MORPH BLD: NORMAL
SODIUM SERPL-SCNC: 139 MMOL/L (ref 136–145)
T AXIS: 238 DEGREES
T AXIS: 248 DEGREES
T AXIS: 253 DEGREES
T OFFSET: 412 MS
T OFFSET: 434 MS
T OFFSET: 438 MS
TARGETS BLD QL SMEAR: NORMAL
VENTRICULAR RATE: 101 BPM
VENTRICULAR RATE: 77 BPM
VENTRICULAR RATE: 81 BPM
WBC # BLD AUTO: 8.9 X10*3/UL (ref 4.4–11.3)

## 2024-09-30 PROCEDURE — 2500000005 HC RX 250 GENERAL PHARMACY W/O HCPCS

## 2024-09-30 PROCEDURE — 99232 SBSQ HOSP IP/OBS MODERATE 35: CPT

## 2024-09-30 PROCEDURE — 80053 COMPREHEN METABOLIC PANEL: CPT

## 2024-09-30 PROCEDURE — 82947 ASSAY GLUCOSE BLOOD QUANT: CPT

## 2024-09-30 PROCEDURE — 83735 ASSAY OF MAGNESIUM: CPT

## 2024-09-30 PROCEDURE — 82248 BILIRUBIN DIRECT: CPT

## 2024-09-30 PROCEDURE — 2500000001 HC RX 250 WO HCPCS SELF ADMINISTERED DRUGS (ALT 637 FOR MEDICARE OP)

## 2024-09-30 PROCEDURE — 84484 ASSAY OF TROPONIN QUANT: CPT

## 2024-09-30 PROCEDURE — RXMED WILLOW AMBULATORY MEDICATION CHARGE

## 2024-09-30 PROCEDURE — 85025 COMPLETE CBC W/AUTO DIFF WBC: CPT

## 2024-09-30 PROCEDURE — 93010 ELECTROCARDIOGRAM REPORT: CPT | Performed by: INTERNAL MEDICINE

## 2024-09-30 PROCEDURE — 36415 COLL VENOUS BLD VENIPUNCTURE: CPT

## 2024-09-30 PROCEDURE — 1200000002 HC GENERAL ROOM WITH TELEMETRY DAILY

## 2024-09-30 PROCEDURE — 84100 ASSAY OF PHOSPHORUS: CPT

## 2024-09-30 RX ORDER — DEXTROSE MONOHYDRATE 50 MG/ML
75 INJECTION, SOLUTION INTRAVENOUS CONTINUOUS
Status: CANCELLED | OUTPATIENT
Start: 2024-09-30 | End: 2024-10-01

## 2024-09-30 RX ORDER — LACTULOSE 10 G/15ML
20 SOLUTION ORAL EVERY 6 HOURS
Qty: 3600 ML | Refills: 1 | Status: SHIPPED | OUTPATIENT
Start: 2024-09-30 | End: 2024-11-29

## 2024-09-30 ASSESSMENT — PAIN SCALES - GENERAL: PAINLEVEL_OUTOF10: 8

## 2024-09-30 NOTE — PROGRESS NOTES
9/30/24 3416 Transitional Care Coordinator Notes:    Left VM for spouse Mayela. Will inform spouse that due to patient having Medicaid, home care services will not be an option. Over 10 referrals has been placed with home care agencies and still no accepting agency. Will attempt to call again at a later time.                       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

## 2024-09-30 NOTE — CARE PLAN
The patient's goals for the shift include Pain control to 4-6 by the end of the shift    The clinical goals for the shift include patient will be free from injury      Problem: Fall/Injury  Goal: Not fall by end of shift  Outcome: Progressing  Goal: Be free from injury by end of the shift  Outcome: Progressing     Problem: Skin  Goal: Promote/optimize nutrition  Outcome: Progressing  Flowsheets (Taken 9/30/2024 6700)  Promote/optimize nutrition: Monitor/record intake including meals

## 2024-09-30 NOTE — CARE PLAN
Problem: Fall/Injury  Goal: Not fall by end of shift  Outcome: Progressing  Goal: Be free from injury by end of the shift  Outcome: Progressing  Goal: Verbalize understanding of personal risk factors for fall in the hospital  Outcome: Progressing  Goal: Verbalize understanding of risk factor reduction measures to prevent injury from fall in the home  Outcome: Progressing  Goal: Use assistive devices by end of the shift  Outcome: Progressing  Goal: Pace activities to prevent fatigue by end of the shift  Outcome: Progressing     Problem: Pain  Goal: Takes deep breaths with improved pain control throughout the shift  Outcome: Progressing  Goal: Turns in bed with improved pain control throughout the shift  Outcome: Progressing  Goal: Walks with improved pain control throughout the shift  Outcome: Progressing  Goal: Performs ADL's with improved pain control throughout shift  Outcome: Progressing  Goal: Participates in PT with improved pain control throughout the shift  Outcome: Progressing  Goal: Free from opioid side effects throughout the shift  Outcome: Progressing  Goal: Free from acute confusion related to pain meds throughout the shift  Outcome: Progressing   The patient's goals for the shift include Pain control to 4-6 by the end of the shift    The clinical goals for the shift include pt will have 1 bm this shift

## 2024-09-30 NOTE — PROGRESS NOTES
"Manuel Bowser is a 64 y.o. male on day 17 of admission presenting with Alcoholic liver failure (Multi).    Subjective   NAEO, no concerns from nursing.    Pt. is doing fine this morning. He is quite confused this morning, reporting repeatedly that he is tired. He has no symptomatic complaints. 3x bowel movements yesterday.    Review of systems: negative, except as above.      Objective   Last Recorded Vitals  Blood pressure 123/78, pulse 101, temperature 36.4 °C (97.5 °F), resp. rate 18, height 1.753 m (5' 9\"), weight 68.9 kg (151 lb 14.4 oz), SpO2 98%.    Intake/Output last 3 Shifts:  I/O last 3 completed shifts:  In: 755 (11 mL/kg) [P.O.:240; I.V.:515 (7.5 mL/kg)]  Out: - (0 mL/kg)   Weight: 68.9 kg     Physical Exam  Vitals reviewed.   Constitutional:       General: He is not in acute distress.     Appearance: Normal appearance. He is not toxic-appearing.   HENT:      Head: Normocephalic and atraumatic.      Mouth/Throat:      Mouth: Mucous membranes are moist.   Eyes:      Extraocular Movements: Extraocular movements intact.   Cardiovascular:      Rate and Rhythm: Normal rate and regular rhythm.      Pulses: Normal pulses.      Heart sounds: Normal heart sounds. No murmur heard.  Pulmonary:      Effort: Pulmonary effort is normal. No respiratory distress.      Breath sounds: Normal breath sounds. No stridor. No wheezing, rhonchi or rales.   Abdominal:      General: Abdomen is flat. Bowel sounds are normal.      Palpations: Abdomen is soft.      Tenderness: There is no abdominal tenderness. There is no guarding.   Musculoskeletal:         General: Normal range of motion.      Right lower leg: No edema.      Left lower leg: No edema.   Skin:     General: Skin is warm.      Capillary Refill: Capillary refill takes less than 2 seconds.      Coloration: Skin is not jaundiced.      Findings: No bruising.   Neurological:      General: No focal deficit present.      Mental Status: He is alert and oriented to person, " place, and time.      Comments: A&Ox4, very tired, not very engaging     Relevant Results  Scheduled medications  pantoprazole, 40 mg, oral, Daily before breakfast   Or  esomeprazole, 40 mg, nasoduodenal tube, Daily before breakfast   Or  pantoprazole, 40 mg, intravenous, Daily before breakfast  lactulose, 20 g, oral, q4h  levothyroxine, 125 mcg, oral, Daily  lidocaine, 1 patch, transdermal, Daily  lidocaine, 5 mL, infiltration, Once  magnesium sulfate, 2 g, intravenous, Once  [Held by provider] melatonin, 6 mg, oral, Daily  [Held by provider] metoprolol tartrate, 25 mg, oral, BID    Continuous medications  dextrose 5 % and lactated Ringer's, 75 mL/hr, Last Rate: 75 mL/hr (09/29/24 9482)    PRN medications  PRN medications: [Held by provider] oxyCODONE **OR** [Held by provider] oxyCODONE      Assessment/Plan   Alcoholic liver failure (Multi)    Manuel Bowser is a 64 y.o. male with PMH of DM, HTN, HFpEF, alcohol use disorder, pancreatitis who is admitted for evaluation of acute liver injury vs. failure. SLP eval with MBS 9/26 deemed pt unsafe to have diet. GOC conversation with wife 9/27 and again 9/30; she would like pt to go home and to be able to eat, not go to facility given poor experiences herself being in a SNF. Given this, we are tentatively planning for discharge w/ health at home. Unfortunately his hepatic encephalopathy redeveloped so discharge is pending the resolution of HE. We have increased lactulose to Q4H while inpatient and plan to discharge on Q8H with instructions to titrate to 3-4 BMs/day.    09/30/24 Updates  - Plan for discharge home once mentation improves    Acute problems  # Acute liver injury/failure, possible acute on chronic liver disease  # Hepatic Encephalopathy  # Coagulopathy [Resolved]  -Hx of chronic alcohol use disorder  -Patient presents with acute liver injury with INR >9.5 +/- failure given reported encephalopathy with A&Ox2 at OSH. Currently A&Ox4, no asterixis or c/f  current encephalopathy though has been treated with lactulose  -Hepatic function panel severely deranged-Alb-1.7,ALP-804,ALT-278,AST-945  -Hepatitis panel, A1AT, Ceruloplasmin,HSV1&2, Acetaminophen level,AFP,Fibrinogen,MICHAEL are all within normal limits  -Ferritin>9000,serum iron-85 WNL  -EBV DNA<35 detected  -IgG-1600  -AMA-29.3 (uln 24.9, >25 is positive), repeat AMA-9/14 is negative  -liver US shows echogenic appearance of the liver compatible with steatosis and normal Doppler interrogation of the hepatic vasculature. There is mild abdominal ascites and a right pleural effusion   -INR initially as high as 9 on admission, now stable at 1.3  Plan:  -Hepatology signing off, pt to follow up outpatient on 10/9/24  -will follow up on pending hepatic work up  -continue with lactulose 20g TID, titrate prn to 3-4 BM/day   -Monitor Hgb, Coags, signs of bleed  -Daily MELD labs     #Acute anaemia/concern for hemolysis  -Hgb 12.0 to 9.5   - pale but not jaundiced  -Tbili-2.7, indirect-1.4, LDH-846, Haptoglobin<30  -peripheral blood smear-Mild microcytosis with many target cells and many Pappenheimer bodies. Findings consistent with hemoglobinopathy.   -Hb remains stable   Plan:  -monitor Hb     # Traumatic catheterization  -Difficult catheterization at MICU with bleeding.  -patient accidentally urinated without difficulty  Plan:  -Urology consulted; hematuria felt to be due more to supratherapeutic INR than urethral trauma  -Pt voiding on his own; will defer butts or bladder scans unless develops retention     Chronic problems  #HFpEF  #Tachycardia [Resolved]  - Limited ECHO -9/13 shows EF of 78%  - CXR without signs of pulmonary edema  - Holding home metop 25mg bid  - OP titration of GDMT     Prevention of hospital-associated complications:  DVT Ppx: SCDs  Diet: Regular  GI PPx: PPI  Bowel Regimen: Scheduled lactulose  Butts: No  IVF: None  Access/Lines: PIVs  Telemetry: Yes  Physical Therapy Consult Needed:  Yes  Antimicrobials: None     DISPOSITION: home    CODE STATUS: Full Code    NOK: Mayela DEEPAKKarrie Bowser (Spouse) [phone]       Shiva Guzmán MD  PGY-1 Resident Physician  Department of Anesthesiology & Perioperative Medicine

## 2024-10-01 ENCOUNTER — DOCUMENTATION (OUTPATIENT)
Dept: HOME HEALTH SERVICES | Facility: HOME HEALTH | Age: 64
End: 2024-10-01
Payer: COMMERCIAL

## 2024-10-01 VITALS
TEMPERATURE: 97.9 F | BODY MASS INDEX: 22.5 KG/M2 | WEIGHT: 151.9 LBS | SYSTOLIC BLOOD PRESSURE: 127 MMHG | HEIGHT: 69 IN | DIASTOLIC BLOOD PRESSURE: 72 MMHG | HEART RATE: 98 BPM | OXYGEN SATURATION: 100 % | RESPIRATION RATE: 18 BRPM

## 2024-10-01 LAB
ACANTHOCYTES BLD QL SMEAR: ABNORMAL
ALBUMIN SERPL BCP-MCNC: <1.5 G/DL (ref 3.4–5)
ALP SERPL-CCNC: 392 U/L (ref 33–136)
ALT SERPL W P-5'-P-CCNC: 49 U/L (ref 10–52)
ANION GAP SERPL CALC-SCNC: 14 MMOL/L (ref 10–20)
AST SERPL W P-5'-P-CCNC: 64 U/L (ref 9–39)
BASO STIPL BLD QL SMEAR: PRESENT
BASOPHILS # BLD MANUAL: 0 X10*3/UL (ref 0–0.1)
BASOPHILS NFR BLD MANUAL: 0 %
BILIRUB DIRECT SERPL-MCNC: 2.7 MG/DL (ref 0–0.3)
BILIRUB SERPL-MCNC: 4.6 MG/DL (ref 0–1.2)
BUN SERPL-MCNC: 6 MG/DL (ref 6–23)
BURR CELLS BLD QL SMEAR: ABNORMAL
CALCIUM SERPL-MCNC: 7 MG/DL (ref 8.6–10.6)
CHLORIDE SERPL-SCNC: 110 MMOL/L (ref 98–107)
CO2 SERPL-SCNC: 21 MMOL/L (ref 21–32)
CREAT SERPL-MCNC: 0.72 MG/DL (ref 0.5–1.3)
EGFRCR SERPLBLD CKD-EPI 2021: >90 ML/MIN/1.73M*2
EOSINOPHIL # BLD MANUAL: 0 X10*3/UL (ref 0–0.7)
EOSINOPHIL NFR BLD MANUAL: 0 %
ERYTHROCYTE [DISTWIDTH] IN BLOOD BY AUTOMATED COUNT: 22.2 % (ref 11.5–14.5)
GLUCOSE SERPL-MCNC: 90 MG/DL (ref 74–99)
HCT VFR BLD AUTO: 28.5 % (ref 41–52)
HGB BLD-MCNC: 9.1 G/DL (ref 13.5–17.5)
IMM GRANULOCYTES # BLD AUTO: 0.04 X10*3/UL (ref 0–0.7)
IMM GRANULOCYTES NFR BLD AUTO: 0.4 % (ref 0–0.9)
LYMPHOCYTES # BLD MANUAL: 0 X10*3/UL (ref 1.2–4.8)
LYMPHOCYTES NFR BLD MANUAL: 0 %
MAGNESIUM SERPL-MCNC: 1.96 MG/DL (ref 1.6–2.4)
MCH RBC QN AUTO: 28.3 PG (ref 26–34)
MCHC RBC AUTO-ENTMCNC: 31.9 G/DL (ref 32–36)
MCV RBC AUTO: 89 FL (ref 80–100)
MONOCYTES # BLD MANUAL: 0.28 X10*3/UL (ref 0.1–1)
MONOCYTES NFR BLD MANUAL: 2.6 %
NEUTROPHILS # BLD MANUAL: 10.43 X10*3/UL (ref 1.2–7.7)
NEUTS BAND # BLD MANUAL: 0.09 X10*3/UL (ref 0–0.7)
NEUTS BAND NFR BLD MANUAL: 0.8 %
NEUTS SEG # BLD MANUAL: 10.34 X10*3/UL (ref 1.2–7)
NEUTS SEG NFR BLD MANUAL: 96.6 %
NRBC BLD-RTO: 0.3 /100 WBCS (ref 0–0)
PHOSPHATE SERPL-MCNC: 2.3 MG/DL (ref 2.5–4.9)
PLATELET # BLD AUTO: 36 X10*3/UL (ref 150–450)
POTASSIUM SERPL-SCNC: 3.2 MMOL/L (ref 3.5–5.3)
PROT SERPL-MCNC: 4.5 G/DL (ref 6.4–8.2)
RBC # BLD AUTO: 3.21 X10*6/UL (ref 4.5–5.9)
RBC MORPH BLD: ABNORMAL
SODIUM SERPL-SCNC: 142 MMOL/L (ref 136–145)
TARGETS BLD QL SMEAR: ABNORMAL
TOTAL CELLS COUNTED BLD: 117
WBC # BLD AUTO: 10.7 X10*3/UL (ref 4.4–11.3)

## 2024-10-01 PROCEDURE — 2500000005 HC RX 250 GENERAL PHARMACY W/O HCPCS

## 2024-10-01 PROCEDURE — 92526 ORAL FUNCTION THERAPY: CPT | Mod: GN

## 2024-10-01 PROCEDURE — 80048 BASIC METABOLIC PNL TOTAL CA: CPT

## 2024-10-01 PROCEDURE — 99239 HOSP IP/OBS DSCHRG MGMT >30: CPT

## 2024-10-01 PROCEDURE — 82248 BILIRUBIN DIRECT: CPT

## 2024-10-01 PROCEDURE — 83735 ASSAY OF MAGNESIUM: CPT

## 2024-10-01 PROCEDURE — 2500000001 HC RX 250 WO HCPCS SELF ADMINISTERED DRUGS (ALT 637 FOR MEDICARE OP)

## 2024-10-01 PROCEDURE — 2500000002 HC RX 250 W HCPCS SELF ADMINISTERED DRUGS (ALT 637 FOR MEDICARE OP, ALT 636 FOR OP/ED)

## 2024-10-01 PROCEDURE — 85007 BL SMEAR W/DIFF WBC COUNT: CPT

## 2024-10-01 PROCEDURE — 36415 COLL VENOUS BLD VENIPUNCTURE: CPT

## 2024-10-01 PROCEDURE — 85027 COMPLETE CBC AUTOMATED: CPT

## 2024-10-01 PROCEDURE — 84100 ASSAY OF PHOSPHORUS: CPT

## 2024-10-01 RX ORDER — POTASSIUM CHLORIDE 20 MEQ/1
40 TABLET, EXTENDED RELEASE ORAL ONCE
Status: COMPLETED | OUTPATIENT
Start: 2024-10-01 | End: 2024-10-01

## 2024-10-01 ASSESSMENT — PAIN - FUNCTIONAL ASSESSMENT
PAIN_FUNCTIONAL_ASSESSMENT: 0-10
PAIN_FUNCTIONAL_ASSESSMENT: 0-10

## 2024-10-01 ASSESSMENT — COGNITIVE AND FUNCTIONAL STATUS - GENERAL
WALKING IN HOSPITAL ROOM: A LOT
TURNING FROM BACK TO SIDE WHILE IN FLAT BAD: A LOT
MOVING FROM LYING ON BACK TO SITTING ON SIDE OF FLAT BED WITH BEDRAILS: A LITTLE
HELP NEEDED FOR BATHING: A LITTLE
STANDING UP FROM CHAIR USING ARMS: A LOT
DRESSING REGULAR LOWER BODY CLOTHING: A LITTLE
CLIMB 3 TO 5 STEPS WITH RAILING: TOTAL
TOILETING: A LITTLE
MOVING TO AND FROM BED TO CHAIR: A LOT
DAILY ACTIVITIY SCORE: 18
MOBILITY SCORE: 12
PERSONAL GROOMING: A LITTLE
DRESSING REGULAR UPPER BODY CLOTHING: A LITTLE
EATING MEALS: A LITTLE

## 2024-10-01 ASSESSMENT — PAIN SCALES - GENERAL
PAINLEVEL_OUTOF10: 6
PAINLEVEL_OUTOF10: 8

## 2024-10-01 ASSESSMENT — PAIN DESCRIPTION - DESCRIPTORS: DESCRIPTORS: ACHING;DISCOMFORT

## 2024-10-01 NOTE — NURSING NOTE
Patient discharged to home with home care and a [facility] referral this evening. He was not able to verbalize an understanding of the AVS so I called his wife at home but she did not answer the phone and the voicemail box was full so I was not able to review it with her either. I sent a copy of the AVS home with him in a discharge folder. I highlighted important sections as well. All his belongings were packed. His IV was removed with tip intact. His 4 medication prescriptions were sent home with him as well. He was transported home by the [facility] ambulance team.

## 2024-10-01 NOTE — PROGRESS NOTES
Manuel Bowser is a 64 y.o. male on day 18 of admission presenting with Alcoholic liver failure (Multi).    SW consulted on 9/30 in relation to concerns for a safe discharge plan for patient. TCC/medical team requested for APS to be called, as medical team are planning discharge for patient. SW recommended that medical team make outreach to APS given concerns about patient's medical status, needs and wife/families abilities to care for him. Per secure chat, attending did call APS on 9/30.     SW to continue to follow.   Kiah Chaudhry MSW [facility]  Care Transitions  2  [phone] or Epic Secure Chat

## 2024-10-01 NOTE — PROGRESS NOTES
"   10/01/24 1157   Discharge Planning   Home or Post Acute Services In home services  ([facility] Program and Outpatient Palliative Care)   Expected Discharge Disposition Home   Does the patient need discharge transport arranged? Yes   RoundTrip coordination needed? Yes   Has discharge transport been arranged? Yes   What day is the transport expected? 10/01/24   What time is the transport expected? 1400       Patient will discharge to home today with the [facility] and Outpatient Palliative Care. Unable to secure home care services due to patient insurance being either out of network or agency not being within the service area. SW will contact APS due to concerns of patient safety and ability to care for himself.     Spoke with patient spouse yesterday about concerns of being able to provide care for the patient. Spouse stated she is receiving home care and that she has difficulty ambulating. TCC discussed discharging to a SNF per PT/OT recommendations. Spouse refused and stated she wants the patient to return home. Informed the spouse that due to patient insurance was unable to find home care agencies to provide care. Spouse stated to send him home and they will \"make due.\" Medical team, SW, and nursing made aware. Transportation is arranged for 2:00 pm via [facility].     Called to inform spouse of transport time. Spouse phone went to  and unable to leave a message due to  inbox being full.    Will call again.    0739 Updates: received a call from Irma, patient  through [facility]. Irma contact information is [phone]. Irma was able to give a recommendation for home care which is called [facility]. Referral was emailed to @[employer]. Spoke with the nurse and she stated they can accept and can start care tomorrow. Will email over final home care orders and AVS to home care agency.    [facility]: [phone]  "

## 2024-10-01 NOTE — CONSULTS
ETHICS CONSULTATION NOTE    CONSULT REQUESTER: Physician : Shiva Guzmán MD- Resident    ETHICS QUESTION: Ethics was consulted on Sunday, September 29 regarding, next of kin surrogate and discharge planning.    BACKGROUND: Mr. Bowser is a 64 year old patient male patient, who has a 18 day hospital course. He presented with alcoholic liver failure.     Process Steps: I have spoken with the team and reviewed the medical record.     Ethically Relevant Medical Information: Patient has declined in the last week and was deemed to be unable to swallow food and water safely. There is discussion regarding placing a dobhof and discharging patient to a SNF which the patient's wife refused both the dobhof and is requesting the patient be discharged home.     Code Status: Full code    Capacity/Decision-Making Considerations: According to the team, patient lacks decision making capacity at this time.     Advance Directives/Family/Support System: Patient has a wife, 2 sisters, and a nephew. Patient's wife is the next of kin surrogate and should be the point of contact for the team. While, we aim for shared decision making amongst the family, the wife is the next of kin surrogate. According to the sisters and nephew, there is concern about the patient's wife including whether she is making the best decisions for the patient. Unless the patient's wife is acting inappropriately or interfering with care, we should continue to defer to her as the next of kin surrogate. If there are concerns about the wife or family, a referral can be made to Adult Protective Services.    Other Consult-Specific Information: Patient's wife has been clear that she does not agree with a dobhof being placed and would like the patient to eat/drink as he wants at home.     ETHICS DISCUSSION & ANALYSIS/RECOMMENDATIONS    Rec 1: In cases where patient's are determined to lack decision making capacity, we identify a next of kin surrogate to help uphold  patient's values and preferences as well as best interest. In this case, the patient's wife is the next of kin and has been clear in her desire to allow patient to eat as desired at home. If there are concerns about the wife or family, a referral can be made to Adult Protective Services.    Rec 2: While discharge to home may not be the ideal option, if the medical team believes that it is appropriate and the needed supports can be put in place (potentially family support, community support, home healthcare), then it would be ethically supportable to discharge the patient home.       FOLLOW UP:   The Ethics Consultation Service remains available.  Please call with any questions or additional concerns..  The Ethics Consultation Service (ECS) can be reached by [phone]..     Urvashi Hernandez, PhD  Clinical Ethicist

## 2024-10-01 NOTE — PROGRESS NOTES
"Manuel Bowser is a 64 y.o. male on day 18 of admission presenting with Alcoholic liver failure (Multi).    CIRO contacted via secure chat from medical team that patient's wife, Mayela Bowser, wants to take patient home rather than SNF. Per medical team, SNF is the recommendation.   SW responded to secure chat to discuss having a team meeting with medical team, ethics, SW, TCC, and patient's wife to further discuss patient's discharge plan. SW recommended it might be helpful for patient's wife to further hear the medical team's recommendations and concerns about patient being discharged home, instead of SNF.   CIRO also called ethics, and spoke to Urvashi Mary on 10/1. SW explained the idea of a team meeting, which ethics agreed to attend, if scheduled. It was also shared that patient going home is considered not the best choice, but sees it as the \"least worst option.\" In addition, ethics, recommended that APS be updated when patient is discharged, if he is discharged back home.     UPDATE 11:00  Medical team informed SW that they do not feel that a meeting is warranted and they will continue to work towards dc today.   SW to continue to follow       UPDATE 16:30  SW called APS hotline ([phone]) to inform them that pt was discharged, and the office was closed. CIRO was connected with CPS, who took down the information and said APS would get it in the morning. SW informed  that the pt has discharged home, and wanted APS to be notified. As CPS did not have access to previous report given on 9/30 by attending, CIRO explained the concerns from medical team again. This included pt being taken home rather than SNF with pts medical conditions.  took information regarding the pt and said that APS would follow up.    Kiah Chaudhry [facility]  Care Transitions  2  [phone] or Epic Secure Chat    "

## 2024-10-01 NOTE — PROGRESS NOTES
Speech-Language Pathology    SLP Adult Inpatient  Speech-Language Pathology Treatment     Patient Name: Manuel Bowser  MRN: [id number]  Today's Date: 10/1/2024  Time Calculation  Start Time: 1104  Stop Time: 1125  Time Calculation (min): 21 min         Assessment:  Severe oropharyngeal dysphagia per Modified Barium Swallow Study 9/26.  Deficits in both safety and efficiency.  High risk for aspiration w/ all PO intake.    Per documentation, medical team had a goals of care conversation had w/ pt and wife.  Educated decision made to not pursue PEG placement and instead continue w/ PO intake despite risk for aspiration.  Aware of risk for aspiration/PNA.     Recommendations:  NPO per results of MBSS; family has made decision to continue w/ soft solids/thin liquids for comfort; aware of risk for aspiration.     Aspiration PNA mitigation strategies: diligent oral care 2x/day and upright positioning/mobility as tolerated.             Plan:  SLP Plan: Skilled SLP  SLP Frequency: 2x per week  Duration:  4 weeks  Discussed POC: Pt, medical team      Goals per MBSS 9/26- expected timeframe to meet goals 2-3 weeks:  - Patient will complete recommended laryngeal/pharyngeal strengthening exercises for at least 20-30 repetitions during treatment session given minimal-moderate cues.  - Patient/Family will verbalize/demonstrate comprehension of dysphagia education, strategies, recommendations and POC.  - Patient  will complete respiratory muscle strength training (RMST) at 75% of MEP in 25/25 trials in order to improve cough strength and airway clearance in the event of penetration/aspiration occurrence.      Subjective   Pt properly identified and treated bedside. Awake and alert, no c/o pain.  Room air.  No family present.   Pt oriented x4, but confusion noted at times.  Pt's language is often tangential and off topic.     Pt admitted w/ acute liver injury vs failure; hepatic encephalopathy.  PMHx: ETOH use disorder.          Objective   SLP provided education re: results of MBSS and need for dysphagia therapy.  Pt indicated understanding and is in agreement w/ completing.  Pt completed laryngeal/pharyngeal strengthening exercises x30 with mod cues for correct completion.   Despite pt's cognitive deficits, able to participate in dysphagia therapy w/ cueing.      10/01/24 at 2:05 PM - Idalmis Woodard, SLP

## 2024-10-01 NOTE — PROGRESS NOTES
"Manuel Bowser is a 64 y.o. male on day 18 of admission presenting with Alcoholic liver failure (Multi).    Subjective   NAEO, no concerns from nursing.    Pt. is doing much better this morning. He is much more alert, oriented, conversational, and adamant he would like to leave. He mentions wanting to go home with his wife and sisters as well as to rehab.    Review of systems: negative, except as above.      Objective   Last Recorded Vitals  Blood pressure 127/72, pulse 98, temperature 36.6 °C (97.9 °F), resp. rate 18, height 1.753 m (5' 9\"), weight 68.9 kg (151 lb 14.4 oz), SpO2 100%.    Intake/Output last 3 Shifts:  I/O last 3 completed shifts:  In: 515 (7.5 mL/kg) [I.V.:515 (7.5 mL/kg)]  Out: - (0 mL/kg)   Weight: 68.9 kg     Physical Exam  Vitals reviewed.   Constitutional:       General: He is not in acute distress.     Appearance: Normal appearance. He is not toxic-appearing.   HENT:      Head: Normocephalic and atraumatic.      Mouth/Throat:      Mouth: Mucous membranes are moist.   Eyes:      Extraocular Movements: Extraocular movements intact.   Cardiovascular:      Rate and Rhythm: Normal rate and regular rhythm.      Pulses: Normal pulses.      Heart sounds: Normal heart sounds. No murmur heard.  Pulmonary:      Effort: Pulmonary effort is normal. No respiratory distress.      Breath sounds: Normal breath sounds. No stridor. No wheezing, rhonchi or rales.   Abdominal:      General: Abdomen is flat. Bowel sounds are normal.      Palpations: Abdomen is soft.      Tenderness: There is no abdominal tenderness. There is no guarding.   Musculoskeletal:         General: Normal range of motion.      Right lower leg: No edema.      Left lower leg: No edema.   Skin:     General: Skin is warm.      Capillary Refill: Capillary refill takes less than 2 seconds.      Coloration: Skin is not jaundiced.      Findings: No bruising.   Neurological:      General: No focal deficit present.      Mental Status: He is alert " and oriented to person, place, and time.      Comments: A&Ox4, awake, conversational, confused, does not have capacity     Relevant Results  Scheduled medications  pantoprazole, 40 mg, oral, Daily before breakfast   Or  esomeprazole, 40 mg, nasoduodenal tube, Daily before breakfast   Or  pantoprazole, 40 mg, intravenous, Daily before breakfast  lactulose, 20 g, oral, q4h  levothyroxine, 125 mcg, oral, Daily  lidocaine, 1 patch, transdermal, Daily  lidocaine, 5 mL, infiltration, Once  magnesium sulfate, 2 g, intravenous, Once  [Held by provider] melatonin, 6 mg, oral, Daily  [Held by provider] metoprolol tartrate, 25 mg, oral, BID    Continuous medications     PRN medications  PRN medications: [Held by provider] oxyCODONE **OR** [Held by provider] oxyCODONE      Assessment/Plan   Alcoholic liver failure (Multi)    Manuel Bowser is a 64 y.o. male with PMH of DM, HTN, HFpEF, alcohol use disorder, pancreatitis who is admitted for evaluation of acute liver injury vs. failure. SLP eval with MBS 9/26 deemed pt unsafe to have diet. GOC conversation with wife 9/27 and again 9/30; she would like pt to go home and to be able to eat, not go to facility given poor experiences herself being in a SNF. Given this, we are tentatively planning for discharge w/ health at home. HE much improved, planning for discharge home today.    10/01/24 Updates  - Plan for discharge home today    Acute problems  # Acute liver injury/failure, possible acute on chronic liver disease  # Hepatic Encephalopathy [Resolved]  # Coagulopathy [Resolved]  -Hx of chronic alcohol use disorder  -Patient presents with acute liver injury with INR >9.5 +/- failure given reported encephalopathy with A&Ox2 at OSH. Currently A&Ox4, no asterixis or c/f current encephalopathy though has been treated with lactulose  -Hepatic function panel severely deranged-Alb-1.7,ALP-804,ALT-278,AST-945  -Hepatitis panel, A1AT, Ceruloplasmin,HSV1&2, Acetaminophen  level,AFP,Fibrinogen,MICHAEL are all within normal limits  -Ferritin>9000,serum iron-85 WNL  -EBV DNA<35 detected  -IgG-1600  -AMA-29.3 (uln 24.9, >25 is positive), repeat AMA-9/14 is negative  -liver US shows echogenic appearance of the liver compatible with steatosis and normal Doppler interrogation of the hepatic vasculature. There is mild abdominal ascites and a right pleural effusion   -INR initially as high as 9 on admission, now stable at 1.3  Plan:  -Hepatology signing off, pt to follow up outpatient on 10/9/24  -will follow up on pending hepatic work up  -continue with lactulose 20g TID, titrate prn to 3-4 BM/day   -Monitor Hgb, Coags, signs of bleed  -Daily MELD labs     #Acute anaemia/concern for hemolysis  -Hgb 12.0 to 9.5   - pale but not jaundiced  -Tbili-2.7, indirect-1.4, LDH-846, Haptoglobin<30  -peripheral blood smear-Mild microcytosis with many target cells and many Pappenheimer bodies. Findings consistent with hemoglobinopathy.   -Hb remains stable   Plan:  -monitor Hb     # Traumatic catheterization  -Difficult catheterization at MICU with bleeding.  -patient accidentally urinated without difficulty  Plan:  -Urology consulted; hematuria felt to be due more to supratherapeutic INR than urethral trauma  -Pt voiding on his own; will defer butts or bladder scans unless develops retention     Chronic problems  #HFpEF  #Tachycardia [Resolved]  - Limited ECHO -9/13 shows EF of 78%  - CXR without signs of pulmonary edema  - Holding home metop 25mg bid  - OP titration of GDMT     Prevention of hospital-associated complications:  DVT Ppx: SCDs  Diet: Regular  GI PPx: PPI  Bowel Regimen: Scheduled lactulose  Butts: No  IVF: None  Access/Lines: PIVs  Telemetry: Yes  Physical Therapy Consult Needed: Yes  Antimicrobials: None     DISPOSITION: home    CODE STATUS: Full Code    NOK: Mayela Bowser (Spouse) [phone]       Shiva Guzmán MD  PGY-1 Resident Physician  Department of Anesthesiology &  Perioperative Medicine

## 2024-10-01 NOTE — NURSING NOTE
I attempted to reach the patient's wife Mayela Handley at [phone] to review the discharge AVS and let her know he is being picked up at 2pm to come home. The call went to voicemail and the mailbox is full so I could not leave a message.

## 2024-10-01 NOTE — CARE PLAN
Problem: Fall/Injury  Goal: Not fall by end of shift  Outcome: Progressing  Goal: Be free from injury by end of the shift  Outcome: Progressing  Goal: Verbalize understanding of personal risk factors for fall in the hospital  Outcome: Progressing  Goal: Verbalize understanding of risk factor reduction measures to prevent injury from fall in the home  Outcome: Progressing  Goal: Use assistive devices by end of the shift  Outcome: Progressing  Goal: Pace activities to prevent fatigue by end of the shift  Outcome: Progressing     Problem: Skin  Goal: Decreased wound size/increased tissue granulation at next dressing change  Outcome: Progressing  Flowsheets (Taken 10/1/2024 1116)  Decreased wound size/increased tissue granulation at next dressing change:   Promote sleep for wound healing   Protective dressings over bony prominences  Goal: Participates in plan/prevention/treatment measures  Outcome: Progressing  Flowsheets (Taken 10/1/2024 1116)  Participates in plan/prevention/treatment measures: Elevate heels  Goal: Prevent/manage excess moisture  Outcome: Progressing  Flowsheets (Taken 10/1/2024 1116)  Prevent/manage excess moisture:   Moisturize dry skin   Cleanse incontinence/protect with barrier cream   Monitor for/manage infection if present  Goal: Prevent/minimize sheer/friction injuries  Outcome: Progressing  Flowsheets (Taken 10/1/2024 1116)  Prevent/minimize sheer/friction injuries:   HOB 30 degrees or less   Turn/reposition every 2 hours/use positioning/transfer devices  Goal: Promote/optimize nutrition  Outcome: Progressing  Flowsheets (Taken 10/1/2024 1116)  Promote/optimize nutrition:   Assist with feeding   Monitor/record intake including meals  Goal: Promote skin healing  Outcome: Progressing  Flowsheets (Taken 10/1/2024 1116)  Promote skin healing:   Assess skin/pad under line(s)/device(s)   Turn/reposition every 2 hours/use positioning/transfer devices   Protective dressings over bony prominences      Problem: Pain  Goal: Takes deep breaths with improved pain control throughout the shift  Outcome: Progressing  Goal: Turns in bed with improved pain control throughout the shift  Outcome: Progressing  Goal: Walks with improved pain control throughout the shift  Outcome: Progressing  Goal: Performs ADL's with improved pain control throughout shift  Outcome: Progressing  Goal: Participates in PT with improved pain control throughout the shift  Outcome: Progressing  Goal: Free from opioid side effects throughout the shift  Outcome: Progressing  Goal: Free from acute confusion related to pain meds throughout the shift  Outcome: Progressing     The clinical goals for the shift include Patient will remain safe and stable throughout the shift

## 2024-10-02 NOTE — PROGRESS NOTES
Manuel Bowser is a 64 y.o. male on day 18 of admission presenting with Alcoholic liver failure (Multi).    SW received call from APS on 10/02 ([phone]) in regards to information given yesterday. APS representative asked for additional details, including pt's address, medical conditions listed, and date he was admitted. SW provided this information. APS stated they would type this information up and call back if needed.    SW will follow as needed.    Kiah Chaudhry MSW [facility]  Care Transitions  2  [phone] or Epic Secure Chat

## 2024-10-02 NOTE — DISCHARGE SUMMARY
Discharge Diagnosis  Alcoholic liver failure (Multi)    Issues Requiring Follow-Up  Alcoholic Cirrhosis s/p Liver Biopsy - Hepatology  HFpEF - PCP  Post-Hospitalization Care - PCP    Discharge Meds     Medication List      START taking these medications     lactulose 20 gram/30 mL oral solution; Take 30 mL (20 g) by mouth every   6 hours.   lidocaine 4 % patch; Place 1 patch over 12 hours on the skin once daily.   Remove & discard patch within 12 hours or as directed by MD.   melatonin 3 mg tablet; Take 2 tablets (6 mg) by mouth as needed at   bedtime for sleep.   metoprolol tartrate 25 mg tablet; Commonly known as: Lopressor; Take 1   tablet (25 mg) by mouth 2 times a day.     CONTINUE taking these medications     aspirin 81 mg chewable tablet   atorvastatin 40 mg tablet; Commonly known as: Lipitor   levothyroxine 125 mcg tablet; Commonly known as: Synthroid, Levoxyl   pantoprazole 40 mg EC tablet; Commonly known as: ProtoNix   thiamine 100 mg tablet; Commonly known as: Vitamin B-1       Test Results Pending At Discharge  Pending Labs       Order Current Status    Extra Urine Gray Tube Collected (09/13/24 0305)    Urinalysis with Reflex Culture and Microscopic Collected (09/13/24 0305)            Hospital Course  Manuel Bowser is a 64 yo male patient with PMHx of DM, HTN, HFpEF, alcohol use disorder, pancreatitis, who presented as a transfer from [facility] ICU for workup of acute liver failure and evaluation for liver transplant. He initially presented for SOB, chest pain and abdominal pain, diagnosed there with pneumonia. He was found to have increased LFTs, became encephalopathic and admitted to [facility] ICU. Received lactulose there, FFP and vit K for coagulopathy. Transferred to [facility] early on 9/13 for continuity of care. On admission to the MICU, he was alert and oriented x 4. He still reports chest pain, SOB, and abdominal pain, however, he note pain has improved. LFTs upon arrival were , , alk phos  800, jocelynn total 2.7 direct 1.3, INR 2.8, alb 1.7, normal creat. US indicated mild ascites.    After a brief stay in the ICU, he was transferred to the floor on the afternoon of 9/13. Hepatology was consulted and recommended a comprehensive work-up of his acute liver decompensation (see their notes for more details). Ultimately pt underwent IR transjugular liver biopsy x2 with hepatic pressure cath and concurrent paracentesis with 600 ml fluid removed on 9/16/24. HVPG of 7 confirmed portal HTN but was less than 10 so considered not significant enough to cause ascites. Cytology of the ascitic fluid was negative. Serologic workup for acute liver injury was largely unremarkable with exception of positive ASMA.   Pt is scheduled for outpatient follow up with Hepatology on 10/9/24 to review biopsy results and workup. Repeat CMP to be done before that visit.    On the floor, pt initially had improvement in mental status with lactulose titrated to 3-4 bowel movements daily, and had gradual improvement of liver enzymes, bili, and INR. However, on 9/23, nursing reported concerns of dysphagia so a SLP evaluation was obtained, which ultimately deemed pt. unsafe for PO dietary intake. This lead to pt. missing several doses of scheduled lactulose and the eventual redevelopment of hepatic encephalopathy. It took several days of aggressive lactulose scheduling (e.g., Q4H) to resolve his hepatic encephalopathy. However, despite this, the pt lacked capacity in decision making in the week prior to discharge.    Repeat SLP evaluation including the utilization of modified barium swallow study deemed the pt to be high risk for aspiration and recommended NPO status, including medications, and a short course of alternative nutrition/hydration/medication support. Furthermore, PT and OT recommendations were for pt to discharge to SNF. Because of the pts lack of capacity, his next of kin (his wife, as he had no HDPOA) was contacted. Despite  extensive counseling by the interdisciplinary medical team, the pts wife elected to have him discharged home and be allowed to eat as he wishes. Prior to discharge, family (not including the pts wife or children) had asked to speak to the medical team regarding the pts care and discharge plans. When informed, they became upset as they did not believe this was in-line with the pts best interests. Even more, they informed the medical team that they pts wife herself was requiring extensive care needs at home provided by home health care services. Of note, the pt, prior to his hospitalization, was providing a significant amount of care for his wife.     Because of these findings, [facility] Ethics was consulted regarding concern for safe discharge. As the patient had no formal HCPOA form filled and the patient's wife was his next-of-kin, they recommended following the wife's decisions for discharge. Despite extensive counseling regarding safety at home in the setting of PT/OT recommendations, the wife consistently expressed her desire for the pt to be discharged home. Mr. Bowser was re-evaluated for capacity on a daily basis, however he continued to have altered mental status, so he was ultimately discharged home on 10/1. While the discharge to home was not the ideal option given the recommendations provided by SLP/PT/OT, it was optimized by incorporating Healthy at Home as well as palliative care. Lastly, because of the concerns expressed by family during the meeting, Ethics recommended filing a report with Adult Protective Services following discharge, which was completed.    Pertinent Physical Exam At Time of Discharge  Vitals reviewed.   Constitutional:       General: He is not in acute distress.     Appearance: Normal appearance. He is not toxic-appearing.   HENT:      Head: Normocephalic and atraumatic.      Mouth/Throat:      Mouth: Mucous membranes are moist.   Eyes:      Extraocular Movements: Extraocular  movements intact.   Cardiovascular:      Rate and Rhythm: Normal rate and regular rhythm.      Pulses: Normal pulses.      Heart sounds: Normal heart sounds. No murmur heard.  Pulmonary:      Effort: Pulmonary effort is normal. No respiratory distress.      Breath sounds: Normal breath sounds. No stridor. No wheezing, rhonchi or rales.   Abdominal:      General: Abdomen is flat. Bowel sounds are normal.      Palpations: Abdomen is soft.      Tenderness: There is no abdominal tenderness. There is no guarding.   Musculoskeletal:         General: Normal range of motion.      Right lower leg: No edema.      Left lower leg: No edema.   Skin:     General: Skin is warm.      Capillary Refill: Capillary refill takes less than 2 seconds.      Coloration: Skin is not jaundiced.      Findings: No bruising.   Neurological:      General: No focal deficit present.      Mental Status: He is alert and oriented to person, place, and time.      Comments: A&Ox4, awake, conversational, confused, does not have capacity       Outpatient Follow-Up  Future Appointments   Date Time Provider Department Center   10/9/2024  9:40 AM Min Ansari MD [id number]         Shiva Guzmán MD

## 2024-10-07 LAB
ATRIAL RATE: 67 BPM
ATRIAL RATE: 78 BPM
P AXIS: 58 DEGREES
P AXIS: 60 DEGREES
P OFFSET: 209 MS
P OFFSET: 209 MS
P ONSET: 160 MS
P ONSET: 162 MS
PR INTERVAL: 120 MS
PR INTERVAL: 122 MS
Q ONSET: 221 MS
Q ONSET: 222 MS
QRS COUNT: 11 BEATS
QRS COUNT: 13 BEATS
QRS DURATION: 80 MS
QRS DURATION: 86 MS
QT INTERVAL: 440 MS
QT INTERVAL: 480 MS
QTC CALCULATION(BAZETT): 501 MS
QTC CALCULATION(BAZETT): 507 MS
QTC FREDERICIA: 480 MS
QTC FREDERICIA: 498 MS
R AXIS: -6 DEGREES
R AXIS: 4 DEGREES
T AXIS: 223 DEGREES
T AXIS: 239 DEGREES
T OFFSET: 441 MS
T OFFSET: 462 MS
VENTRICULAR RATE: 67 BPM
VENTRICULAR RATE: 78 BPM

## 2024-10-08 ENCOUNTER — TELEPHONE (OUTPATIENT)
Dept: GASTROENTEROLOGY | Facility: HOSPITAL | Age: 64
End: 2024-10-08
Payer: COMMERCIAL

## 2024-10-08 LAB
ATRIAL RATE: 77 BPM
ATRIAL RATE: 81 BPM
P AXIS: 44 DEGREES
P AXIS: 54 DEGREES
P OFFSET: 212 MS
P OFFSET: 213 MS
P ONSET: 159 MS
P ONSET: 161 MS
PR INTERVAL: 136 MS
PR INTERVAL: 142 MS
Q ONSET: 229 MS
Q ONSET: 230 MS
QRS COUNT: 13 BEATS
QRS COUNT: 13 BEATS
QRS DURATION: 62 MS
QRS DURATION: 62 MS
QT INTERVAL: 408 MS
QT INTERVAL: 418 MS
QTC CALCULATION(BAZETT): 473 MS
QTC CALCULATION(BAZETT): 473 MS
QTC FREDERICIA: 450 MS
QTC FREDERICIA: 454 MS
R AXIS: -29 DEGREES
R AXIS: -31 DEGREES
T AXIS: 238 DEGREES
T AXIS: 253 DEGREES
T OFFSET: 434 MS
T OFFSET: 438 MS
VENTRICULAR RATE: 77 BPM
VENTRICULAR RATE: 81 BPM

## 2024-10-09 ENCOUNTER — APPOINTMENT (OUTPATIENT)
Dept: GASTROENTEROLOGY | Facility: HOSPITAL | Age: 64
End: 2024-10-09
Payer: COMMERCIAL

## 2024-10-09 LAB
LABORATORY COMMENT REPORT: NORMAL
PATH REPORT.ADDENDUM SPEC: NORMAL
PATH REPORT.FINAL DX SPEC: NORMAL
PATH REPORT.GROSS SPEC: NORMAL
PATH REPORT.RELEVANT HX SPEC: NORMAL
PATH REPORT.TOTAL CANCER: NORMAL

## 2024-11-06 ENCOUNTER — DOCUMENTATION (OUTPATIENT)
Dept: GASTROENTEROLOGY | Facility: HOSPITAL | Age: 64
End: 2024-11-06
Payer: COMMERCIAL

## 2024-11-06 NOTE — PROGRESS NOTES
The patient no showed for his appointment today.  He was hospitalized recently and discharged home despite the recommendation to go to SNF after a discussion with ethics.    The patient had markedly elevated liver test and a mixed pattern.  The patient did have a liver biopsy that indicated he had acute alcoholic hepatitis.  The patient did have ascites and fluid studies were consistent with portal hypertension.  He had an HVPG measuring 7.  The patient's ascites is likely from alcoholic hepatitis.    Min Ansari MD